# Patient Record
Sex: FEMALE | Race: WHITE | NOT HISPANIC OR LATINO | Employment: OTHER | URBAN - METROPOLITAN AREA
[De-identification: names, ages, dates, MRNs, and addresses within clinical notes are randomized per-mention and may not be internally consistent; named-entity substitution may affect disease eponyms.]

---

## 2017-04-05 ENCOUNTER — OFFICE VISIT (OUTPATIENT)
Dept: MEDICAL GROUP | Facility: PHYSICIAN GROUP | Age: 82
End: 2017-04-05
Payer: MEDICARE

## 2017-04-05 VITALS
TEMPERATURE: 97.7 F | HEART RATE: 71 BPM | HEIGHT: 61 IN | OXYGEN SATURATION: 94 % | SYSTOLIC BLOOD PRESSURE: 130 MMHG | WEIGHT: 118 LBS | BODY MASS INDEX: 22.28 KG/M2 | RESPIRATION RATE: 16 BRPM | DIASTOLIC BLOOD PRESSURE: 50 MMHG

## 2017-04-05 DIAGNOSIS — S72.001D CLOSED RIGHT HIP FRACTURE, WITH ROUTINE HEALING, SUBSEQUENT ENCOUNTER: ICD-10-CM

## 2017-04-05 DIAGNOSIS — I10 ESSENTIAL HYPERTENSION: ICD-10-CM

## 2017-04-05 DIAGNOSIS — F51.01 PRIMARY INSOMNIA: ICD-10-CM

## 2017-04-05 DIAGNOSIS — J44.9 CHRONIC OBSTRUCTIVE PULMONARY DISEASE, UNSPECIFIED COPD TYPE (HCC): ICD-10-CM

## 2017-04-05 PROCEDURE — G8432 DEP SCR NOT DOC, RNG: HCPCS | Performed by: FAMILY MEDICINE

## 2017-04-05 PROCEDURE — 99214 OFFICE O/P EST MOD 30 MIN: CPT | Performed by: FAMILY MEDICINE

## 2017-04-05 PROCEDURE — 1036F TOBACCO NON-USER: CPT | Performed by: FAMILY MEDICINE

## 2017-04-05 PROCEDURE — 0518F FALL PLAN OF CARE DOCD: CPT | Mod: 8P | Performed by: FAMILY MEDICINE

## 2017-04-05 PROCEDURE — G8420 CALC BMI NORM PARAMETERS: HCPCS | Performed by: FAMILY MEDICINE

## 2017-04-05 PROCEDURE — 3288F FALL RISK ASSESSMENT DOCD: CPT | Performed by: FAMILY MEDICINE

## 2017-04-05 PROCEDURE — G8598 ASA/ANTIPLAT THER USED: HCPCS | Performed by: FAMILY MEDICINE

## 2017-04-05 PROCEDURE — 1100F PTFALLS ASSESS-DOCD GE2>/YR: CPT | Performed by: FAMILY MEDICINE

## 2017-04-05 PROCEDURE — 4040F PNEUMOC VAC/ADMIN/RCVD: CPT | Mod: 8P | Performed by: FAMILY MEDICINE

## 2017-04-05 RX ORDER — LIDOCAINE 50 MG/G
OINTMENT TOPICAL PRN
COMMUNITY

## 2017-04-05 RX ORDER — ALPRAZOLAM 0.5 MG/1
0.5 TABLET ORAL NIGHTLY PRN
Qty: 45 TAB | Refills: 0 | Status: SHIPPED | OUTPATIENT
Start: 2017-04-05 | End: 2017-05-13 | Stop reason: SDUPTHER

## 2017-04-05 RX ORDER — DOXYCYCLINE HYCLATE 100 MG
100 TABLET ORAL 2 TIMES DAILY
COMMUNITY
End: 2018-08-08

## 2017-04-05 RX ORDER — ALPRAZOLAM 0.5 MG/1
0.5 TABLET ORAL NIGHTLY PRN
Qty: 45 TAB | Refills: 0 | Status: CANCELLED | OUTPATIENT
Start: 2017-04-05

## 2017-04-05 ASSESSMENT — ENCOUNTER SYMPTOMS
DIZZINESS: 0
EYES NEGATIVE: 1
NECK PAIN: 0
CHILLS: 0
GASTROINTESTINAL NEGATIVE: 1
RESPIRATORY NEGATIVE: 1
CONSTIPATION: 0
HEMOPTYSIS: 0
PSYCHIATRIC NEGATIVE: 1
MYALGIAS: 0
COUGH: 0
CARDIOVASCULAR NEGATIVE: 1
FEVER: 0
HEADACHES: 0
PALPITATIONS: 0
NEUROLOGICAL NEGATIVE: 1
CONSTITUTIONAL NEGATIVE: 1

## 2017-04-05 NOTE — PROGRESS NOTES
Subjective:      Pat Sevilla is a 84 y.o. female who presents with Hospital Follow-up and Medication Refill            HPI Comments: 1. Closed right hip fracture, with routine healing, subsequent encounter  Had right orif after fall was in rehab and now back at home and ambulating with no walker and doing well, one more pt visit and then will be d/c and has ortho appt today    2. Chronic obstructive pulmonary disease, unspecified COPD type (CMS-[facility])  Patient has a diagnosis of copd and is using their medications and trying to avoid triggers such as colds/smoke/allergens.controlled    Stable on o2    3. Essential hypertension  Currently treated for HTN, taking meds with no CP or sob, monitors bp at home periodically. controlled        Past Medical History:    Cerebrovascular disease                                       COPD (chronic obstructive pulmonary disease) (*               HTN (hypertension)                                            Syncope                                                       Hyperlipidemia                                                Anemia                                          9/15/2014       Comment:normocytic    CAD (coronary artery disease)                   July 2008       Comment:s/p Stent of Left LCX x2 with minivision bare                metal stent, Proximal and mid LCX  Past Surgical History:    CARDIAC CATH, LEFT HEART                                       Smoking Status: Former Smoker                   Packs/Day: 0.50  Years: 60         Types: Cigarettes      Quit date: 02/20/2013    Smokeless Status: Never Used                        Alcohol Use: No              Review of patient's family history indicates:    Heart Disease                  Mother                    Heart Disease                  Father                    Heart Disease                  Brother                     Current outpatient prescriptions: •  doxycycline (VIBRAMYCIN) 100 MG Tab, Take 100 mg by  mouth 2 times a day., Disp: , Rfl: •  lidocaine (XYLOCAINE) 5 % Ointment, Apply  to affected area(s) as needed., Disp: , Rfl: •  alprazolam (XANAX) 0.5 MG Tab, Take 1 Tab by mouth at bedtime as needed for Sleep (pt takes 1/2 in morning and 1 at night)., Disp: 45 Tab, Rfl: 0•  simvastatin (ZOCOR) 20 MG Tab, Take 1 Tab by mouth every evening., Disp: 90 Tab, Rfl: 3•  nitroglycerin (NITROSTAT) 0.4 MG SL Tab, Place 1 Tab under tongue as needed for Chest Pain. Not to exceed 3 tablets in 15 minutes, Disp: 25 Tab, Rfl: 1•  aspirin EC (ECOTRIN) 81 MG TBEC, Take 81 mg by mouth every day. Indications: Heart Attack, Disp: , Rfl: •  Fluticasone-Salmeterol (ADVAIR DISKUS INH), Inhale  by mouth. As directed , Disp: , Rfl: •  albuterol (VENTOLIN OR PROVENTIL) 108 (90 BASE) MCG/ACT AERS, Inhale 2 Puffs by mouth every 6 hours as needed. ud , Disp: , Rfl: •  tiotropium (SPIRIVA HANDIHALER) 18 MCG CAPS, Inhale 18 mcg by mouth every day., Disp: , Rfl: •  lisinopril (PRINIVIL) 20 MG Tab, TAKE ONE TABLET BY MOUTH DAILY, Disp: 90 Tab, Rfl: 1•  erythromycin (ERYTHROCIN STEARATE) 250 MG tablet, Take 250 mg by mouth 3 times a day., Disp: , Rfl:     Assessment/plan: diagnoses listed as above in problem list. Patient will continue with current medications/diet/lifestyle modifications    Patient will continue with current medication regimen, advised on taking meds every day, f/u in 3 mo.            Review of Systems   Constitutional: Negative.  Negative for fever and chills.        Past Medical History:    Cerebrovascular disease                                       COPD (chronic obstructive pulmonary disease) (*               HTN (hypertension)                                            Syncope                                                       Hyperlipidemia                                                Anemia                                          9/15/2014       Comment:normocytic    CAD (coronary artery disease)                    "July 2008       Comment:s/p Stent of Left LCX x2 with minivision bare                metal stent, Proximal and mid LCX  Past Surgical History:    CARDIAC CATH, LEFT HEART                                       Smoking Status: Former Smoker                   Packs/Day: 0.50  Years: 60        Types: Cigarettes      Quit date: 02/20/2013    Smokeless Status: Never Used                        Alcohol Use: No              Review of patient's family history indicates:    Heart Disease                  Mother                    Heart Disease                  Father                    Heart Disease                  Brother                    HENT: Negative.    Eyes: Negative.    Respiratory: Negative.  Negative for cough and hemoptysis.    Cardiovascular: Negative.  Negative for chest pain and palpitations.   Gastrointestinal: Negative.  Negative for constipation.   Genitourinary: Negative.  Negative for dysuria and urgency.   Musculoskeletal: Positive for joint pain. Negative for myalgias and neck pain.   Skin: Negative.  Negative for rash.   Neurological: Negative.  Negative for dizziness and headaches.   Endo/Heme/Allergies: Negative.    Psychiatric/Behavioral: Negative.  Negative for suicidal ideas.          Objective:     /50 mmHg  Pulse 71  Temp(Src) 36.5 °C (97.7 °F)  Resp 16  Ht 1.562 m (5' 1.5\")  Wt 53.524 kg (118 lb)  BMI 21.94 kg/m2  SpO2 94%     Physical Exam   Constitutional: She is oriented to person, place, and time. No distress.   HENT:   Head: Normocephalic and atraumatic.   Right Ear: External ear normal.   Left Ear: External ear normal.   Nose: Nose normal.   Mouth/Throat: Oropharynx is clear and moist. No oropharyngeal exudate.   Eyes: Pupils are equal, round, and reactive to light. Right eye exhibits no discharge. Left eye exhibits no discharge. No scleral icterus.   Neck: Normal range of motion. Neck supple. No JVD present. No tracheal deviation present. No thyromegaly present. "   Cardiovascular: Normal rate, regular rhythm, normal heart sounds and intact distal pulses.  Exam reveals no gallop and no friction rub.    No murmur heard.  Pulmonary/Chest: Effort normal and breath sounds normal. No stridor. No respiratory distress. She has no wheezes. She has no rales. She exhibits no tenderness.   Abdominal: Soft. She exhibits no distension. There is no tenderness.   Musculoskeletal:        Right hip: She exhibits decreased strength. She exhibits normal range of motion.   Lymphadenopathy:     She has no cervical adenopathy.   Neurological: She is alert and oriented to person, place, and time.   Skin: Skin is warm and dry. She is not diaphoretic.   Psychiatric: Judgment normal.   Nursing note and vitals reviewed.              Assessment/Plan:     1. Closed right hip fracture, with routine healing, subsequent encounter      2. Chronic obstructive pulmonary disease, unspecified COPD type (CMS-HCC)      3. Essential hypertension

## 2017-04-05 NOTE — MR AVS SNAPSHOT
"Pat Sevilla   2017 11:00 AM   Office Visit   MRN: [id number]    Department:  [facility]    Dept Phone:  [phone]    Description:  Female : 1932   Provider:  Florian Henley M.D.           Reason for Visit     Hospital Follow-up     Medication Refill           Allergies as of 2017     Allergen Noted Reactions    Codeine 2011         You were diagnosed with     Closed right hip fracture, with routine healing, subsequent encounter   [268753]       Chronic obstructive pulmonary disease, unspecified COPD type (CMS-HCC)   [5245795]       Essential hypertension   [7579113]       Primary insomnia   [585932]         Vital Signs     Blood Pressure Pulse Temperature Respirations Height Weight    130/50 mmHg 71 36.5 °C (97.7 °F) 16 1.562 m (5' 1.5\") 53.524 kg (118 lb)    Body Mass Index Oxygen Saturation Smoking Status             21.94 kg/m2 94% Former Smoker         Basic Information     Date Of Birth Sex Race Ethnicity Preferred Language    1932 Female White Non- English      Problem List              ICD-10-CM Priority Class Noted - Resolved    CAD (coronary artery disease) I25.10 High  Unknown - Present    Cerebrovascular disease I67.9 High  Unknown - Present    COPD (chronic obstructive pulmonary disease) (CMS-HCC) J44.9 High  Unknown - Present    Hyperlipemia E78.5 High  Unknown - Present    HTN (hypertension) I10 High  Unknown - Present    Syncope R55 High  Unknown - Present    Hyperlipidemia E78.5 High  Unknown - Present    Epistaxis R04.0   2012 - Present    Anemia (Chronic) D64.9   9/15/2014 - Present      Health Maintenance        Date Due Completion Dates    IMM DTaP/Tdap/Td Vaccine (1 - Tdap) 1951 ---    IMM ZOSTER VACCINE 1992 ---    BONE DENSITY 1997 ---    IMM PNEUMOCOCCAL 65+ (ADULT) LOW/MEDIUM RISK SERIES (1 of 2 - PCV13) 1997 ---    MAMMOGRAM 10/11/2017 10/11/2016 (Declined)    Override on 10/11/2016: Patient Declined    PAP " SMEAR 10/11/2019 10/11/2016 (Declined)    Override on 10/11/2016: Patient Declined    COLONOSCOPY 10/11/2026 10/11/2016 (Declined)    Override on 10/11/2016: Patient Declined            Current Immunizations     Influenza Vaccine Adult HD 10/11/2016      Below and/or attached are the medications your provider expects you to take. Review all of your home medications and newly ordered medications with your provider and/or pharmacist. Follow medication instructions as directed by your provider and/or pharmacist. Please keep your medication list with you and share with your provider. Update the information when medications are discontinued, doses are changed, or new medications (including over-the-counter products) are added; and carry medication information at all times in the event of emergency situations     Allergies:  CODEINE - (reactions not documented)               Medications  Valid as of: April 05, 2017 -  4:49 PM    Generic Name Brand Name Tablet Size Instructions for use    Albuterol Sulfate (Aero Soln) albuterol 108 (90 BASE) MCG/ACT Inhale 2 Puffs by mouth every 6 hours as needed. ud         ALPRAZolam (Tab) XANAX 0.5 MG Take 1 Tab by mouth at bedtime as needed for Sleep (pt takes 1/2 in morning and 1 at night).        Aspirin (Tablet Delayed Response) ECOTRIN 81 MG Take 81 mg by mouth every day. Indications: Heart Attack        Doxycycline Hyclate (Tab) VIBRAMYCIN 100 MG Take 100 mg by mouth 2 times a day.        Erythromycin Stearate (Tab) ERYTHROCIN 250 MG Take 250 mg by mouth 3 times a day.        Fluticasone-Salmeterol   Inhale  by mouth. As directed         Lidocaine (Ointment) XYLOCAINE 5 % Apply  to affected area(s) as needed.        Lisinopril (Tab) PRINIVIL 20 MG TAKE ONE TABLET BY MOUTH DAILY        Nitroglycerin (SL Tab) NITROSTAT 0.4 MG Place 1 Tab under tongue as needed for Chest Pain. Not to exceed 3 tablets in 15 minutes        Simvastatin (Tab) ZOCOR 20 MG Take 1 Tab by mouth every  evening.        Tiotropium Bromide Monohydrate (Cap) SPIRIVA 18 MCG Inhale 18 mcg by mouth every day.        .                 Medicines prescribed today were sent to:     [facility] #[id number] - [place], [place] - [address]    [address] [place] [address]    Phone: [phone] Fax: [phone]    Open 24 Hours?: No      Medication refill instructions:       If your prescription bottle indicates you have medication refills left, it is not necessary to call your provider’s office. Please contact your pharmacy and they will refill your medication.    If your prescription bottle indicates you do not have any refills left, you may request refills at any time through one of the following ways: The online [employer] system (except Urgent Care), by calling your provider’s office, or by asking your pharmacy to contact your provider’s office with a refill request. Medication refills are processed only during regular business hours and may not be available until the next business day. Your provider may request additional information or to have a follow-up visit with you prior to refilling your medication.   *Please Note: Medication refills are assigned a new Rx number when refilled electronically. Your pharmacy may indicate that no refills were authorized even though a new prescription for the same medication is available at the pharmacy. Please request the medicine by name with the pharmacy before contacting your provider for a refill.           [employer] Status: Patient Declined

## 2017-05-16 RX ORDER — ALPRAZOLAM 0.5 MG/1
TABLET ORAL
Qty: 45 TAB | Refills: 0 | Status: SHIPPED | OUTPATIENT
Start: 2017-05-16 | End: 2017-05-17 | Stop reason: SDUPTHER

## 2017-05-16 NOTE — TELEPHONE ENCOUNTER
Was the patient seen in the last year in this department? Yes     Does patient have an active prescription for medications requested? No     Received Request Via: Pharmacy     Last office visit 04/05/17    Last labs 12/28/16      NO Drug Screen on file.

## 2017-05-17 NOTE — TELEPHONE ENCOUNTER
Was the patient seen in the last year in this department? Yes     Does patient have an active prescription for medications requested? Yes     Received Request Via: Pharmacy     Last Visit: 4/5/17  Last Labs: 12/29/16

## 2017-05-18 RX ORDER — ALPRAZOLAM 0.5 MG/1
TABLET ORAL
Qty: 45 TAB | Refills: 0 | Status: SHIPPED | OUTPATIENT
Start: 2017-05-18 | End: 2017-08-09 | Stop reason: SDUPTHER

## 2017-08-02 RX ORDER — ALPRAZOLAM 0.5 MG/1
TABLET ORAL
Qty: 45 TAB | Refills: 0 | OUTPATIENT
Start: 2017-08-02

## 2017-08-09 ENCOUNTER — OFFICE VISIT (OUTPATIENT)
Dept: MEDICAL GROUP | Facility: PHYSICIAN GROUP | Age: 82
End: 2017-08-09
Payer: MEDICARE

## 2017-08-09 VITALS
HEART RATE: 81 BPM | RESPIRATION RATE: 16 BRPM | SYSTOLIC BLOOD PRESSURE: 140 MMHG | WEIGHT: 118 LBS | TEMPERATURE: 97.8 F | DIASTOLIC BLOOD PRESSURE: 52 MMHG | HEIGHT: 61 IN | BODY MASS INDEX: 22.28 KG/M2 | OXYGEN SATURATION: 88 %

## 2017-08-09 DIAGNOSIS — J44.9 CHRONIC OBSTRUCTIVE PULMONARY DISEASE, UNSPECIFIED COPD TYPE (HCC): ICD-10-CM

## 2017-08-09 DIAGNOSIS — R07.89 OTHER CHEST PAIN: ICD-10-CM

## 2017-08-09 DIAGNOSIS — J96.11 CHRONIC RESPIRATORY FAILURE WITH HYPOXIA (HCC): ICD-10-CM

## 2017-08-09 DIAGNOSIS — F41.9 ANXIETY: ICD-10-CM

## 2017-08-09 DIAGNOSIS — Z78.0 POST-MENOPAUSAL: ICD-10-CM

## 2017-08-09 DIAGNOSIS — I10 ESSENTIAL HYPERTENSION: ICD-10-CM

## 2017-08-09 PROCEDURE — 99214 OFFICE O/P EST MOD 30 MIN: CPT | Performed by: FAMILY MEDICINE

## 2017-08-09 RX ORDER — ALPRAZOLAM 0.5 MG/1
TABLET ORAL
Qty: 45 TAB | Refills: 0 | Status: SHIPPED | OUTPATIENT
Start: 2017-08-09 | End: 2017-08-21 | Stop reason: SDUPTHER

## 2017-08-09 RX ORDER — NITROGLYCERIN 0.4 MG/1
0.4 TABLET SUBLINGUAL PRN
Qty: 25 TAB | Refills: 1 | Status: CANCELLED | OUTPATIENT
Start: 2017-08-09

## 2017-08-09 RX ORDER — NITROGLYCERIN 0.4 MG/1
0.4 TABLET SUBLINGUAL PRN
Qty: 25 TAB | Refills: 1 | Status: SHIPPED | OUTPATIENT
Start: 2017-08-09 | End: 2018-08-08 | Stop reason: SDUPTHER

## 2017-08-09 RX ORDER — ALPRAZOLAM 0.5 MG/1
TABLET ORAL
Qty: 45 TAB | Refills: 0 | Status: CANCELLED | OUTPATIENT
Start: 2017-08-09

## 2017-08-09 ASSESSMENT — ENCOUNTER SYMPTOMS
CONSTIPATION: 0
MYALGIAS: 0
NECK PAIN: 0
HEADACHES: 0
RESPIRATORY NEGATIVE: 1
NEUROLOGICAL NEGATIVE: 1
PALPITATIONS: 0
FEVER: 0
DIZZINESS: 0
EYES NEGATIVE: 1
CHILLS: 0
GASTROINTESTINAL NEGATIVE: 1
CARDIOVASCULAR NEGATIVE: 1
NERVOUS/ANXIOUS: 1
HEMOPTYSIS: 0
COUGH: 0
CONSTITUTIONAL NEGATIVE: 1

## 2017-08-09 NOTE — MR AVS SNAPSHOT
"        Pat Sevilla   2017 10:15 AM   Office Visit   MRN: [id number]    Department:  [facility]    Dept Phone:  [phone]    Description:  Female : 1932   Provider:  Florian Henley M.D.           Reason for Visit     Follow-Up     Medication Refill           Allergies as of 2017     Allergen Noted Reactions    Codeine 2011         You were diagnosed with     Other chest pain   [786.59.ICD-9-CM]       Anxiety   [849711]       Essential hypertension   [1642143]       Chronic obstructive pulmonary disease, unspecified COPD type (CMS-HCC)   [9483407]       Chronic respiratory failure with hypoxia (CMS-HCC)   [678142]       Post-menopausal   [356196]         Vital Signs     Blood Pressure Pulse Temperature Respirations Height Weight    140/52 mmHg 81 36.6 °C (97.8 °F) 16 1.549 m (5' 0.98\") 53.524 kg (118 lb)    Body Mass Index Oxygen Saturation Smoking Status             22.31 kg/m2 88% Former Smoker         Basic Information     Date Of Birth Sex Race Ethnicity Preferred Language    1932 Female White Non- English      Problem List              ICD-10-CM Priority Class Noted - Resolved    CAD (coronary artery disease) I25.10 High  Unknown - Present    COPD (chronic obstructive pulmonary disease) (CMS-[facility]) J44.9 High  Unknown - Present    Hyperlipemia E78.5 High  Unknown - Present    HTN (hypertension) I10 High  Unknown - Present    Hyperlipidemia E78.5 High  Unknown - Present    Chronic respiratory failure with hypoxia (CMS-HCC) J96.11   2017 - Present      Health Maintenance        Date Due Completion Dates    IMM DTaP/Tdap/Td Vaccine (1 - Tdap) 1951 ---    IMM ZOSTER VACCINE 1992 ---    BONE DENSITY 1997 ---    IMM PNEUMOCOCCAL 65+ (ADULT) LOW/MEDIUM RISK SERIES (1 of 2 - PCV13) 1997 ---    IMM INFLUENZA (1) 2017 10/11/2016    MAMMOGRAM 10/11/2017 10/11/2016 (Declined)    Override on 10/11/2016: Patient Declined    PAP SMEAR 10/11/2019 " 10/11/2016 (Declined)    Override on 10/11/2016: Patient Declined    COLONOSCOPY 10/11/2026 10/11/2016 (Declined)    Override on 10/11/2016: Patient Declined            Current Immunizations     Influenza Vaccine Adult HD 10/11/2016      Below and/or attached are the medications your provider expects you to take. Review all of your home medications and newly ordered medications with your provider and/or pharmacist. Follow medication instructions as directed by your provider and/or pharmacist. Please keep your medication list with you and share with your provider. Update the information when medications are discontinued, doses are changed, or new medications (including over-the-counter products) are added; and carry medication information at all times in the event of emergency situations     Allergies:  CODEINE - (reactions not documented)               Medications  Valid as of: August 09, 2017 - 10:47 AM    Generic Name Brand Name Tablet Size Instructions for use    Albuterol Sulfate (Aero Soln) albuterol 108 (90 BASE) MCG/ACT Inhale 2 Puffs by mouth every 6 hours as needed. ud         ALPRAZolam (Tab) XANAX 0.5 MG TAKE ONE-HALF TABLET BY MOUTH EVERY MORNING AND ONE TABLET IN THE EVENING        Aspirin (Tablet Delayed Response) ECOTRIN 81 MG Take 81 mg by mouth every day. Indications: Heart Attack        Doxycycline Hyclate (Tab) VIBRAMYCIN 100 MG Take 100 mg by mouth 2 times a day.        Erythromycin Stearate (Tab) ERYTHROCIN 250 MG Take 250 mg by mouth 3 times a day.        Fluticasone-Salmeterol   Inhale  by mouth. As directed         Lidocaine (Ointment) XYLOCAINE 5 % Apply  to affected area(s) as needed.        Lisinopril (Tab) PRINIVIL 20 MG TAKE ONE TABLET BY MOUTH DAILY        Nitroglycerin (SL Tab) NITROSTAT 0.4 MG Place 1 Tab under tongue as needed for Chest Pain. Not to exceed 3 tablets in 15 minutes        Simvastatin (Tab) ZOCOR 20 MG Take 1 Tab by mouth every evening.        Tiotropium Bromide  Monohydrate (Cap) SPIRIVA 18 MCG Inhale 18 mcg by mouth every day.        .                 Medicines prescribed today were sent to:     [facility] #[id number] - [place], [place] - [address]    [address] [place] [address]    Phone: [phone] Fax: [phone]    Open 24 Hours?: No      Medication refill instructions:       If your prescription bottle indicates you have medication refills left, it is not necessary to call your provider’s office. Please contact your pharmacy and they will refill your medication.    If your prescription bottle indicates you do not have any refills left, you may request refills at any time through one of the following ways: The online [employer] system (except Urgent Care), by calling your provider’s office, or by asking your pharmacy to contact your provider’s office with a refill request. Medication refills are processed only during regular business hours and may not be available until the next business day. Your provider may request additional information or to have a follow-up visit with you prior to refilling your medication.   *Please Note: Medication refills are assigned a new Rx number when refilled electronically. Your pharmacy may indicate that no refills were authorized even though a new prescription for the same medication is available at the pharmacy. Please request the medicine by name with the pharmacy before contacting your provider for a refill.        Your To Do List     Future Labs/Procedures Complete By Expires    CBC WITHOUT DIFFERENTIAL  As directed 2/9/2018    COMP METABOLIC PANEL  As directed 8/9/2018    FREE THYROXINE  As directed 8/9/2018    LIPID PROFILE  As directed 8/9/2018    OCCULT BLOOD FECES IMMUNOASSAY  As directed 8/9/2018    TRIIDOTHYRONINE  As directed 8/9/2018    TSH  As directed 8/9/2018    VITAMIN D,25 HYDROXY  As directed 8/9/2018         [employer] Status: Patient Declined

## 2017-08-09 NOTE — PROGRESS NOTES
Subjective:      Pat Sevilla is a 84 y.o. female who presents with Follow-Up and Medication Refill            HPI Comments: 1. Other chest pain  controlled    - nitroglycerin (NITROSTAT) 0.4 MG SL Tab; Place 1 Tab under tongue as needed for Chest Pain. Not to exceed 3 tablets in 15 minutes  Dispense: 25 Tab; Refill: 1    2. Anxiety  controlled    - alprazolam (XANAX) 0.5 MG Tab; TAKE ONE-HALF TABLET BY MOUTH EVERY MORNING AND ONE TABLET IN THE EVENING  Dispense: 45 Tab; Refill: 0    3. Essential hypertension  Currently treated for HTN, taking meds with no CP or sob, monitors bp at home periodically. controlled        4. Chronic obstructive pulmonary disease, unspecified COPD type (CMS-HCC)  Patient has a diagnosis of copd and is using their medications and trying to avoid triggers such as colds/smoke/allergens.controlled    Stable on o2    Past Medical History:    Cerebrovascular disease                                       COPD (chronic obstructive pulmonary disease) (*               HTN (hypertension)                                            Syncope                                                       Hyperlipidemia                                                Anemia                                          9/15/2014       Comment:normocytic    CAD (coronary artery disease)                   July 2008       Comment:s/p Stent of Left LCX x2 with minivision bare                metal stent, Proximal and mid LCX  Past Surgical History:    CARDIAC CATH, LEFT HEART                                       Smoking Status: Former Smoker                   Packs/Day: 0.50  Years: 60         Types: Cigarettes      Quit date: 02/20/2013    Smokeless Status: Never Used                        Alcohol Use: No              Review of patient's family history indicates:    Heart Disease                  Mother                    Heart Disease                  Father                    Heart Disease                  Brother                      Current outpatient prescriptions: •  nitroglycerin (NITROSTAT) 0.4 MG SL Tab, Place 1 Tab under tongue as needed for Chest Pain. Not to exceed 3 tablets in 15 minutes, Disp: 25 Tab, Rfl: 1•  alprazolam (XANAX) 0.5 MG Tab, TAKE ONE-HALF TABLET BY MOUTH EVERY MORNING AND ONE TABLET IN THE EVENING, Disp: 45 Tab, Rfl: 0•  doxycycline (VIBRAMYCIN) 100 MG Tab, Take 100 mg by mouth 2 times a day., Disp: , Rfl: •  lidocaine (XYLOCAINE) 5 % Ointment, Apply  to affected area(s) as needed., Disp: , Rfl: •  lisinopril (PRINIVIL) 20 MG Tab, TAKE ONE TABLET BY MOUTH DAILY, Disp: 90 Tab, Rfl: 1•  erythromycin (ERYTHROCIN STEARATE) 250 MG tablet, Take 250 mg by mouth 3 times a day., Disp: , Rfl: •  simvastatin (ZOCOR) 20 MG Tab, Take 1 Tab by mouth every evening., Disp: 90 Tab, Rfl: 3•  aspirin EC (ECOTRIN) 81 MG TBEC, Take 81 mg by mouth every day. Indications: Heart Attack, Disp: , Rfl: •  Fluticasone-Salmeterol (ADVAIR DISKUS INH), Inhale  by mouth. As directed , Disp: , Rfl: •  albuterol (VENTOLIN OR PROVENTIL) 108 (90 BASE) MCG/ACT AERS, Inhale 2 Puffs by mouth every 6 hours as needed. ud , Disp: , Rfl: •  tiotropium (SPIRIVA HANDIHALER) 18 MCG CAPS, Inhale 18 mcg by mouth every day., Disp: , Rfl:           Review of Systems   Constitutional: Negative.  Negative for fever and chills.        Past Medical History:    Cerebrovascular disease                                       COPD (chronic obstructive pulmonary disease) (*               HTN (hypertension)                                            Syncope                                                       Hyperlipidemia                                                Anemia                                          9/15/2014       Comment:normocytic    CAD (coronary artery disease)                   July 2008       Comment:s/p Stent of Left LCX x2 with minivision bare                metal stent, Proximal and mid LCX  Past Surgical History:    CARDIAC CATH,  "LEFT HEART                                       Smoking Status: Former Smoker                   Packs/Day: 0.50  Years: 60        Types: Cigarettes      Quit date: 02/20/2013    Smokeless Status: Never Used                        Alcohol Use: No              Review of patient's family history indicates:    Heart Disease                  Mother                    Heart Disease                  Father                    Heart Disease                  Brother                    HENT: Negative.    Eyes: Negative.    Respiratory: Negative.  Negative for cough and hemoptysis.    Cardiovascular: Negative.  Negative for chest pain and palpitations.   Gastrointestinal: Negative.  Negative for constipation.   Genitourinary: Negative.  Negative for dysuria and urgency.   Musculoskeletal: Positive for joint pain. Negative for myalgias and neck pain.   Skin: Negative.  Negative for rash.   Neurological: Negative.  Negative for dizziness and headaches.   Endo/Heme/Allergies: Negative.    Psychiatric/Behavioral: Negative for suicidal ideas. The patient is nervous/anxious.           Objective:     /52 mmHg  Pulse 81  Temp(Src) 36.6 °C (97.8 °F)  Resp 16  Ht 1.549 m (5' 0.98\")  Wt 53.524 kg (118 lb)  BMI 22.31 kg/m2  SpO2 88%     Physical Exam   Constitutional: She is oriented to person, place, and time. No distress.   HENT:   Head: Normocephalic and atraumatic.   Right Ear: External ear normal.   Left Ear: External ear normal.   Nose: Nose normal.   Mouth/Throat: Oropharynx is clear and moist. No oropharyngeal exudate.   Eyes: Pupils are equal, round, and reactive to light. Right eye exhibits no discharge. Left eye exhibits no discharge. No scleral icterus.   Neck: Normal range of motion. Neck supple. No JVD present. No tracheal deviation present. No thyromegaly present.   Cardiovascular: Normal rate, regular rhythm, normal heart sounds and intact distal pulses.  Exam reveals no gallop and no friction rub.    No " murmur heard.  Pulmonary/Chest: Effort normal and breath sounds normal. No stridor. No respiratory distress. She has no wheezes. She has no rales. She exhibits no tenderness.   Stable on O2   Abdominal: Soft. She exhibits no distension. There is no tenderness.   Lymphadenopathy:     She has no cervical adenopathy.   Neurological: She is alert and oriented to person, place, and time.   Skin: Skin is warm and dry. She is not diaphoretic.   Psychiatric: Judgment normal.   Nursing note and vitals reviewed.              Assessment/Plan:     1. Other chest pain    - nitroglycerin (NITROSTAT) 0.4 MG SL Tab; Place 1 Tab under tongue as needed for Chest Pain. Not to exceed 3 tablets in 15 minutes  Dispense: 25 Tab; Refill: 1    2. Anxiety    - alprazolam (XANAX) 0.5 MG Tab; TAKE ONE-HALF TABLET BY MOUTH EVERY MORNING AND ONE TABLET IN THE EVENING  Dispense: 45 Tab; Refill: 0    3. Essential hypertension      4. Chronic obstructive pulmonary disease, unspecified COPD type (CMS-HCC)

## 2017-08-17 RX ORDER — LISINOPRIL 20 MG/1
20 TABLET ORAL DAILY
Qty: 90 TAB | Refills: 1 | Status: SHIPPED | OUTPATIENT
Start: 2017-08-17 | End: 2018-02-13 | Stop reason: SDUPTHER

## 2017-08-17 NOTE — TELEPHONE ENCOUNTER
Was the patient seen in the last year in this department? Yes     Does patient have an active prescription for medications requested? No     Received Request Via: Patient   Last seen 8/9/17  Last labs  12/29/16

## 2017-08-21 DIAGNOSIS — R07.89 OTHER CHEST PAIN: ICD-10-CM

## 2017-08-21 DIAGNOSIS — J96.11 CHRONIC RESPIRATORY FAILURE WITH HYPOXIA (HCC): ICD-10-CM

## 2017-08-21 DIAGNOSIS — Z78.0 POST-MENOPAUSAL: ICD-10-CM

## 2017-08-21 DIAGNOSIS — I10 ESSENTIAL HYPERTENSION: ICD-10-CM

## 2017-08-21 DIAGNOSIS — F41.9 ANXIETY: ICD-10-CM

## 2017-08-21 DIAGNOSIS — J44.9 CHRONIC OBSTRUCTIVE PULMONARY DISEASE, UNSPECIFIED COPD TYPE (HCC): ICD-10-CM

## 2017-08-21 RX ORDER — ALPRAZOLAM 0.5 MG/1
TABLET ORAL
Qty: 45 TAB | Refills: 0 | Status: SHIPPED | OUTPATIENT
Start: 2017-08-21 | End: 2017-09-12 | Stop reason: SDUPTHER

## 2017-09-08 LAB
25(OH)D3+25(OH)D2 SERPL-MCNC: 11.4 NG/ML (ref 30–100)
ALBUMIN SERPL-MCNC: 4.2 G/DL (ref 3.5–4.7)
ALBUMIN/GLOB SERPL: 1.4 {RATIO} (ref 1.2–2.2)
ALP SERPL-CCNC: 95 IU/L (ref 39–117)
ALT SERPL-CCNC: 8 IU/L (ref 0–32)
AST SERPL-CCNC: 19 IU/L (ref 0–40)
BILIRUB SERPL-MCNC: 1 MG/DL (ref 0–1.2)
BUN SERPL-MCNC: 12 MG/DL (ref 8–27)
BUN/CREAT SERPL: 12 (ref 12–28)
CALCIUM SERPL-MCNC: 9.3 MG/DL (ref 8.7–10.3)
CHLORIDE SERPL-SCNC: 98 MMOL/L (ref 96–106)
CHOLEST SERPL-MCNC: 149 MG/DL (ref 100–199)
CO2 SERPL-SCNC: 23 MMOL/L (ref 18–29)
COMMENT 011824: NORMAL
CREAT SERPL-MCNC: 1.01 MG/DL (ref 0.57–1)
ERYTHROCYTE [DISTWIDTH] IN BLOOD BY AUTOMATED COUNT: 15.1 % (ref 12.3–15.4)
GLOBULIN SER CALC-MCNC: 2.9 G/DL (ref 1.5–4.5)
GLUCOSE SERPL-MCNC: 108 MG/DL (ref 65–99)
HCT VFR BLD AUTO: 33.9 % (ref 34–46.6)
HDLC SERPL-MCNC: 59 MG/DL
HGB BLD-MCNC: 11.2 G/DL (ref 11.1–15.9)
LDLC SERPL CALC-MCNC: 70 MG/DL (ref 0–99)
MCH RBC QN AUTO: 28.4 PG (ref 26.6–33)
MCHC RBC AUTO-ENTMCNC: 33 G/DL (ref 31.5–35.7)
MCV RBC AUTO: 86 FL (ref 79–97)
NRBC BLD AUTO-RTO: ABNORMAL %
PLATELET # BLD AUTO: 328 X10E3/UL (ref 150–379)
POTASSIUM SERPL-SCNC: 4.1 MMOL/L (ref 3.5–5.2)
PROT SERPL-MCNC: 7.1 G/DL (ref 6–8.5)
RBC # BLD AUTO: 3.94 X10E6/UL (ref 3.77–5.28)
SODIUM SERPL-SCNC: 139 MMOL/L (ref 134–144)
T3 SERPL-MCNC: 111 NG/DL (ref 71–180)
T4 FREE SERPL-MCNC: 1.2 NG/DL (ref 0.82–1.77)
TRIGL SERPL-MCNC: 98 MG/DL (ref 0–149)
TSH SERPL DL<=0.005 MIU/L-ACNC: 0.95 UIU/ML (ref 0.45–4.5)
VLDLC SERPL CALC-MCNC: 20 MG/DL (ref 5–40)
WBC # BLD AUTO: 6.6 X10E3/UL (ref 3.4–10.8)

## 2017-09-11 LAB — HEMOCCULT STL QL IA: NEGATIVE

## 2017-09-12 ENCOUNTER — OFFICE VISIT (OUTPATIENT)
Dept: MEDICAL GROUP | Facility: PHYSICIAN GROUP | Age: 82
End: 2017-09-12
Payer: MEDICARE

## 2017-09-12 VITALS
DIASTOLIC BLOOD PRESSURE: 60 MMHG | BODY MASS INDEX: 19.52 KG/M2 | WEIGHT: 99.4 LBS | SYSTOLIC BLOOD PRESSURE: 122 MMHG | HEART RATE: 89 BPM | TEMPERATURE: 97.1 F | OXYGEN SATURATION: 90 % | RESPIRATION RATE: 16 BRPM | HEIGHT: 60 IN

## 2017-09-12 DIAGNOSIS — F34.1 DYSTHYMIA: ICD-10-CM

## 2017-09-12 DIAGNOSIS — R07.89 OTHER CHEST PAIN: ICD-10-CM

## 2017-09-12 DIAGNOSIS — I10 ESSENTIAL HYPERTENSION: ICD-10-CM

## 2017-09-12 DIAGNOSIS — F41.9 ANXIETY: ICD-10-CM

## 2017-09-12 DIAGNOSIS — N18.2 CRI (CHRONIC RENAL INSUFFICIENCY), STAGE 2 (MILD): ICD-10-CM

## 2017-09-12 DIAGNOSIS — J44.9 CHRONIC OBSTRUCTIVE PULMONARY DISEASE, UNSPECIFIED COPD TYPE (HCC): ICD-10-CM

## 2017-09-12 DIAGNOSIS — J96.11 CHRONIC RESPIRATORY FAILURE WITH HYPOXIA (HCC): ICD-10-CM

## 2017-09-12 DIAGNOSIS — E78.2 MIXED HYPERLIPIDEMIA: ICD-10-CM

## 2017-09-12 DIAGNOSIS — Z78.0 POST-MENOPAUSAL: ICD-10-CM

## 2017-09-12 PROBLEM — N18.9 CRI (CHRONIC RENAL INSUFFICIENCY): Status: ACTIVE | Noted: 2017-09-12

## 2017-09-12 PROCEDURE — 99214 OFFICE O/P EST MOD 30 MIN: CPT | Performed by: FAMILY MEDICINE

## 2017-09-12 RX ORDER — ALPRAZOLAM 0.5 MG/1
TABLET ORAL
Qty: 45 TAB | Refills: 0 | Status: SHIPPED | OUTPATIENT
Start: 2017-09-12 | End: 2017-10-26 | Stop reason: SDUPTHER

## 2017-09-12 RX ORDER — FLUOXETINE 10 MG/1
10 CAPSULE ORAL DAILY
Qty: 30 CAP | Refills: 3 | Status: SHIPPED | OUTPATIENT
Start: 2017-09-12 | End: 2018-02-03 | Stop reason: SDUPTHER

## 2017-09-12 ASSESSMENT — ENCOUNTER SYMPTOMS
INSOMNIA: 0
MYALGIAS: 0
RESPIRATORY NEGATIVE: 1
NECK PAIN: 0
COUGH: 0
FEVER: 0
GASTROINTESTINAL NEGATIVE: 1
CONSTIPATION: 0
NERVOUS/ANXIOUS: 1
MUSCULOSKELETAL NEGATIVE: 1
HEMOPTYSIS: 0
CHILLS: 0
CARDIOVASCULAR NEGATIVE: 1
DEPRESSION: 1
HEADACHES: 0
DIZZINESS: 0
NEUROLOGICAL NEGATIVE: 1
CONSTITUTIONAL NEGATIVE: 1
EYES NEGATIVE: 1
PALPITATIONS: 0

## 2017-09-12 ASSESSMENT — PATIENT HEALTH QUESTIONNAIRE - PHQ9: CLINICAL INTERPRETATION OF PHQ2 SCORE: 0

## 2017-09-12 NOTE — PROGRESS NOTES
Subjective:      Pat Sevilla is a 85 y.o. female who presents with Hypertension            1. Chronic respiratory failure with hypoxia (CMS-HCC)  Stable on O2    2. Chronic obstructive pulmonary disease, unspecified COPD type (CMS-HCC)  Patient has a diagnosis of copd and is using their medications and trying to avoid triggers such as colds/smoke/allergens.controlled      3. Mixed hyperlipidemia  Currently treated for HLD, taking meds with no new myalgias or joint pain, watching fats in diet  controlled      4. Dysthymia  Depressed mood since her  left her last year. We will try some prozac to help with mood and increase appetite  - fluoxetine (PROZAC) 10 MG Cap; Take 1 Cap by mouth every day.  Dispense: 30 Cap; Refill: 3    5. CRI (chronic renal insufficiency), stage 2 (mild)  Patient is currently being followed for chronic renal insufficiency. They are avoiding nsaids and maintaining hydration and following renal diet. Taking all appropriate meds. No new change in urine frequency or volume.      Past Medical History:  9/15/2014: Anemia      Comment: normocytic  July 2008: CAD (coronary artery disease)      Comment: s/p Stent of Left LCX x2 with minivision bare                metal stent, Proximal and mid LCX  No date: Cerebrovascular disease  No date: COPD (chronic obstructive pulmonary disease) (*  No date: HTN (hypertension)  No date: Hyperlipidemia  No date: Syncope  Past Surgical History:  No date: CARDIAC CATH, LEFT HEART  Smoking status: Former Smoker                                                              Packs/day: 0.50      Years: 60.00        Types: Cigarettes     Quit date: 2/20/2013  Smokeless tobacco: Never Used                      Alcohol use: No              Review of patient's family history indicates:    Heart Disease                  Mother                    Heart Disease                  Father                    Heart Disease                  Brother                      Current Outpatient Prescriptions: •  fluoxetine (PROZAC) 10 MG Cap, Take 1 Cap by mouth every day., Disp: 30 Cap, Rfl: 3•  alprazolam (XANAX) 0.5 MG Tab, TAKE ONE-HALF TABLET BY MOUTH EVERY MORNING AND ONE TABLET IN THE EVENING, Disp: 45 Tab, Rfl: 0•  lisinopril (PRINIVIL) 20 MG Tab, Take 1 Tab by mouth every day. TAKE ONE TABLET BY MOUTH DAILY, Disp: 90 Tab, Rfl: 1•  nitroglycerin (NITROSTAT) 0.4 MG SL Tab, Place 1 Tab under tongue as needed for Chest Pain. Not to exceed 3 tablets in 15 minutes, Disp: 25 Tab, Rfl: 1•  doxycycline (VIBRAMYCIN) 100 MG Tab, Take 100 mg by mouth 2 times a day., Disp: , Rfl: •  lidocaine (XYLOCAINE) 5 % Ointment, Apply  to affected area(s) as needed., Disp: , Rfl: •  erythromycin (ERYTHROCIN STEARATE) 250 MG tablet, Take 250 mg by mouth 3 times a day., Disp: , Rfl: •  simvastatin (ZOCOR) 20 MG Tab, Take 1 Tab by mouth every evening., Disp: 90 Tab, Rfl: 3•  aspirin EC (ECOTRIN) 81 MG TBEC, Take 81 mg by mouth every day. Indications: Heart Attack, Disp: , Rfl: •  Fluticasone-Salmeterol (ADVAIR DISKUS INH), Inhale  by mouth. As directed , Disp: , Rfl: •  albuterol (VENTOLIN OR PROVENTIL) 108 (90 BASE) MCG/ACT AERS, Inhale 2 Puffs by mouth every 6 hours as needed. ud , Disp: , Rfl: •  tiotropium (SPIRIVA HANDIHALER) 18 MCG CAPS, Inhale 18 mcg by mouth every day., Disp: , Rfl:           Review of Systems   Constitutional: Negative.  Negative for chills and fever.        Past Medical History:  9/15/2014: Anemia      Comment: normocytic  July 2008: CAD (coronary artery disease)      Comment: s/p Stent of Left LCX x2 with minivision bare                metal stent, Proximal and mid LCX  No date: Cerebrovascular disease  No date: COPD (chronic obstructive pulmonary disease) (*  No date: HTN (hypertension)  No date: Hyperlipidemia  No date: Syncope  Past Surgical History:  No date: CARDIAC CATH, LEFT HEART  Smoking status: Former Smoker                                                               Packs/day: 0.50      Years: 60.00        Types: Cigarettes     Quit date: 2/20/2013  Smokeless tobacco: Never Used                      Alcohol use: No              Review of patient's family history indicates:    Heart Disease                  Mother                    Heart Disease                  Father                    Heart Disease                  Brother                    HENT: Negative.    Eyes: Negative.    Respiratory: Negative.  Negative for cough and hemoptysis.    Cardiovascular: Negative.  Negative for chest pain and palpitations.   Gastrointestinal: Negative.  Negative for constipation.   Genitourinary: Negative.  Negative for dysuria and urgency.   Musculoskeletal: Negative.  Negative for myalgias and neck pain.   Skin: Negative.  Negative for rash.   Neurological: Negative.  Negative for dizziness and headaches.   Endo/Heme/Allergies: Negative.    Psychiatric/Behavioral: Positive for depression. Negative for suicidal ideas. The patient is nervous/anxious. The patient does not have insomnia.           Objective:     /60   Pulse 89   Temp 36.2 °C (97.1 °F)   Resp 16   Ht 1.524 m (5')   Wt 45.1 kg (99 lb 6.4 oz)   SpO2 90%   BMI 19.41 kg/m²      Physical Exam   Constitutional: She is oriented to person, place, and time. She appears well-developed and well-nourished. No distress.   HENT:   Head: Normocephalic and atraumatic.   Mouth/Throat: Oropharynx is clear and moist. No oropharyngeal exudate.   Eyes: Pupils are equal, round, and reactive to light.   Cardiovascular: Normal rate, regular rhythm, normal heart sounds and intact distal pulses.  Exam reveals no gallop and no friction rub.    No murmur heard.  Pulmonary/Chest: Effort normal and breath sounds normal. No respiratory distress. She has no wheezes. She has no rales. She exhibits no tenderness.   Neurological: She is alert and oriented to person, place, and time.   Skin: She is not diaphoretic.   Psychiatric: Her behavior  is normal. Judgment and thought content normal. Her affect is not blunt, not labile and not inappropriate. Thought content is not paranoid and not delusional. She exhibits a depressed mood. She expresses no homicidal and no suicidal ideation. She expresses no suicidal plans and no homicidal plans.   Nursing note and vitals reviewed.              Assessment/Plan:     1. Chronic respiratory failure with hypoxia (CMS-[facility])      2. Chronic obstructive pulmonary disease, unspecified COPD type (CMS-[facility])      3. Mixed hyperlipidemia      4. Dysthymia    - fluoxetine (PROZAC) 10 MG Cap; Take 1 Cap by mouth every day.  Dispense: 30 Cap; Refill: 3    5. CRI (chronic renal insufficiency), stage 2 (mild)

## 2017-10-26 DIAGNOSIS — Z78.0 POST-MENOPAUSAL: ICD-10-CM

## 2017-10-26 DIAGNOSIS — I10 ESSENTIAL HYPERTENSION: ICD-10-CM

## 2017-10-26 DIAGNOSIS — J96.11 CHRONIC RESPIRATORY FAILURE WITH HYPOXIA (HCC): ICD-10-CM

## 2017-10-26 DIAGNOSIS — R07.89 OTHER CHEST PAIN: ICD-10-CM

## 2017-10-26 DIAGNOSIS — J44.9 CHRONIC OBSTRUCTIVE PULMONARY DISEASE, UNSPECIFIED COPD TYPE (HCC): ICD-10-CM

## 2017-10-26 DIAGNOSIS — F41.9 ANXIETY: ICD-10-CM

## 2017-10-26 RX ORDER — ALPRAZOLAM 0.5 MG/1
TABLET ORAL
Qty: 45 TAB | Refills: 0 | Status: SHIPPED | OUTPATIENT
Start: 2017-10-26 | End: 2017-10-26 | Stop reason: SDUPTHER

## 2017-10-26 RX ORDER — ALPRAZOLAM 0.5 MG/1
TABLET ORAL
Qty: 45 TAB | Refills: 0 | Status: SHIPPED | OUTPATIENT
Start: 2017-10-26 | End: 2017-12-04 | Stop reason: SDUPTHER

## 2017-10-26 NOTE — TELEPHONE ENCOUNTER
Was the patient seen in the last year in this department? Yes     Does patient have an active prescription for medications requested? No     Received Request Via: Patient   Last seen 9 /12 /17  Last labs  9/11/17    Has current drug screen

## 2017-12-04 DIAGNOSIS — F41.9 ANXIETY: ICD-10-CM

## 2017-12-04 DIAGNOSIS — J96.11 CHRONIC RESPIRATORY FAILURE WITH HYPOXIA (HCC): ICD-10-CM

## 2017-12-04 DIAGNOSIS — Z78.0 POST-MENOPAUSAL: ICD-10-CM

## 2017-12-04 DIAGNOSIS — R07.89 OTHER CHEST PAIN: ICD-10-CM

## 2017-12-04 DIAGNOSIS — J44.9 CHRONIC OBSTRUCTIVE PULMONARY DISEASE, UNSPECIFIED COPD TYPE (HCC): ICD-10-CM

## 2017-12-04 DIAGNOSIS — I10 ESSENTIAL HYPERTENSION: ICD-10-CM

## 2017-12-04 NOTE — TELEPHONE ENCOUNTER
Was the patient seen in the last year in this department? Yes     Does patient have an active prescription for medications requested? Yes     Received Request Via: Patient     Last Visit: 9/12/17  Last Labs: UDS 2017

## 2017-12-05 DIAGNOSIS — E78.5 HYPERLIPIDEMIA, UNSPECIFIED HYPERLIPIDEMIA TYPE: ICD-10-CM

## 2017-12-05 RX ORDER — ALPRAZOLAM 0.5 MG/1
TABLET ORAL
Qty: 45 TAB | Refills: 0 | Status: SHIPPED | OUTPATIENT
Start: 2017-12-05 | End: 2018-01-24 | Stop reason: SDUPTHER

## 2017-12-05 RX ORDER — SIMVASTATIN 20 MG
20 TABLET ORAL EVERY EVENING
Qty: 90 TAB | Refills: 3 | Status: SHIPPED | OUTPATIENT
Start: 2017-12-05 | End: 2018-04-24 | Stop reason: SINTOL

## 2017-12-05 NOTE — TELEPHONE ENCOUNTER
Was the patient seen in the last year in this department? Yes     Does patient have an active prescription for medications requested? No     Received Request Via: Patient   Last seen  9/12/17  Last labs   9/6/17

## 2017-12-06 ENCOUNTER — OFFICE VISIT (OUTPATIENT)
Dept: CARDIOLOGY | Facility: PHYSICIAN GROUP | Age: 82
End: 2017-12-06
Payer: MEDICARE

## 2017-12-06 VITALS
HEART RATE: 81 BPM | OXYGEN SATURATION: 95 % | SYSTOLIC BLOOD PRESSURE: 140 MMHG | BODY MASS INDEX: 19.63 KG/M2 | WEIGHT: 100 LBS | HEIGHT: 60 IN | DIASTOLIC BLOOD PRESSURE: 60 MMHG

## 2017-12-06 DIAGNOSIS — I77.9 BILATERAL CAROTID ARTERY DISEASE (HCC): ICD-10-CM

## 2017-12-06 DIAGNOSIS — I25.10 CORONARY ARTERY DISEASE INVOLVING NATIVE CORONARY ARTERY OF NATIVE HEART WITHOUT ANGINA PECTORIS: ICD-10-CM

## 2017-12-06 DIAGNOSIS — E78.2 MIXED HYPERLIPIDEMIA: ICD-10-CM

## 2017-12-06 DIAGNOSIS — I10 ESSENTIAL HYPERTENSION: ICD-10-CM

## 2017-12-06 PROCEDURE — 99214 OFFICE O/P EST MOD 30 MIN: CPT | Performed by: INTERNAL MEDICINE

## 2017-12-06 ASSESSMENT — ENCOUNTER SYMPTOMS
BLURRED VISION: 0
HEADACHES: 0
DIZZINESS: 0
COUGH: 0
BRUISES/BLEEDS EASILY: 0
PALPITATIONS: 0
LOSS OF CONSCIOUSNESS: 0
PND: 0
NAUSEA: 0
SHORTNESS OF BREATH: 1
FEVER: 0
FOCAL WEAKNESS: 0
ORTHOPNEA: 0
SORE THROAT: 0
CLAUDICATION: 0
FALLS: 0
ABDOMINAL PAIN: 0

## 2017-12-06 NOTE — LETTER
[facility]   [address] [place] [address]  Phone: [phone]  Fax: [phone]              Pat Sevilla  8/31/1932    Encounter Date: 12/6/2017    Addie Parks M.D.          PROGRESS NOTE:  Subjective:   Pat Sevilla is a pleasant 85-year-old female no history of   1. CAD status post PCI BMS 2 proximal and mid circumflex in 2007- Nuclear stress test from 3/16 negative for ischemia  2. Hypertension   3. Dyslipidemia   4. Carotid artery disease  5. COPD on oxygen 24 X 7- 4 L NC.   Presenting today for follow-up evaluation of CAD, hypertension and dyslipidemia.    Patient denied any complaints of exertional chest pain pressure or tightness. Chronic dyspnea on exertion unchanged. She still continues to be on 4 L nasal cannula 24 X 7. No complaints of myalgias while on statins. Denied any complaints of dizziness lightheadedness or LOC. No complaints of lower extremity edema or claudication.        Past Medical History:   asdfasdfads Date   • Anemia 9/15/2014    normocytic   • CAD (coronary artery disease) July 2008    s/p Stent of Left LCX x2 with minivision bare metal stent, Proximal and mid LCX   • Cerebrovascular disease    • COPD (chronic obstructive pulmonary disease) (CMS-HCC)    • HTN (hypertension)    • Hyperlipidemia    • Syncope      Past Surgical History:   Procedure Laterality Date   • CARDIAC CATH, LEFT HEART       Family History   Problem Relation Age of Onset   • Heart Disease Mother    • Heart Disease Father    • Heart Disease Brother      History   Smoking Status   • Former Smoker   • Packs/day: 0.50   • Years: 60.00   • Types: Cigarettes   • Quit date: 2/20/2013   Smokeless Tobacco   • Never Used     Allergies   Allergen Reactions   • Codeine      Outpatient Encounter Prescriptions as of 12/6/2017   Medication Sig Dispense Refill   • Fluticasone Furoate-Vilanterol (BREO ELLIPTA) 200-25 MCG/INH AEROSOL POWDER, BREATH  ACTIVATED Inhale 1 Puff by mouth.     • alprazolam (XANAX) 0.5 MG Tab TAKE ONE-HALF TABLET BY MOUTH EVERY MORNING AND ONE TABLET IN THE EVENING 45 Tab 0   • simvastatin (ZOCOR) 20 MG Tab Take 1 Tab by mouth every evening. 90 Tab 3   • fluoxetine (PROZAC) 10 MG Cap Take 1 Cap by mouth every day. 30 Cap 3   • lisinopril (PRINIVIL) 20 MG Tab Take 1 Tab by mouth every day. TAKE ONE TABLET BY MOUTH DAILY 90 Tab 1   • lidocaine (XYLOCAINE) 5 % Ointment Apply  to affected area(s) as needed.     • erythromycin (ERYTHROCIN STEARATE) 250 MG tablet Take 250 mg by mouth 3 times a day.     • aspirin EC (ECOTRIN) 81 MG TBEC Take 81 mg by mouth every day. Indications: Heart Attack     • albuterol (VENTOLIN OR PROVENTIL) 108 (90 BASE) MCG/ACT AERS Inhale 2 Puffs by mouth every 6 hours as needed. ud      • tiotropium (SPIRIVA HANDIHALER) 18 MCG CAPS Inhale 18 mcg by mouth every day.     • nitroglycerin (NITROSTAT) 0.4 MG SL Tab Place 1 Tab under tongue as needed for Chest Pain. Not to exceed 3 tablets in 15 minutes 25 Tab 1   • doxycycline (VIBRAMYCIN) 100 MG Tab Take 100 mg by mouth 2 times a day.     • Fluticasone-Salmeterol (ADVAIR DISKUS INH) Inhale  by mouth. As directed        No facility-administered encounter medications on file as of 12/6/2017.      Review of Systems   Constitutional: Negative for fever.   HENT: Negative for sore throat.    Eyes: Negative for blurred vision.   Respiratory: Positive for shortness of breath. Negative for cough.    Cardiovascular: Negative for chest pain, palpitations, orthopnea, claudication, leg swelling and PND.   Gastrointestinal: Negative for abdominal pain and nausea.   Musculoskeletal: Negative for falls.   Skin: Negative for rash.   Neurological: Negative for dizziness, focal weakness, loss of consciousness and headaches.   Endo/Heme/Allergies: Does not bruise/bleed easily.        Objective:   /60   Pulse 81   Ht 1.524 m (5')   Wt 45.4 kg (100 lb)   SpO2 95%   BMI 19.53  kg/m²      Physical Exam   Constitutional: No distress.   Using supplemental oxygen       HENT:   Head: Normocephalic and atraumatic.   Mouth/Throat: Oropharynx is clear and moist.   Eyes: Conjunctivae are normal. Pupils are equal, round, and reactive to light. Right eye exhibits no discharge. Left eye exhibits no discharge. No scleral icterus.   Neck: No JVD present. No tracheal deviation present.   Cardiovascular: Normal rate and regular rhythm.  Exam reveals no gallop and no friction rub.    No murmur heard.  Pulmonary/Chest: Effort normal. She has no rales.   Coarse breath sounds bilateral lower lungs   Abdominal: Bowel sounds are normal. There is no tenderness.   Musculoskeletal: She exhibits no edema.   Neurological: She is alert.   Skin: No rash noted. She is not diaphoretic.   Psychiatric: She has a normal mood and affect.   Results for DAT ENRIQUEZ (MRN [id number]) as of 12/6/2017 14:53   11/15/2016  9/6/2017    Sodium 142 139   Potassium 4.9 4.1   Chloride 99 98   Co2 26 23   Glucose 95 108 (H)   Bun 14 12   Creatinine 1.12 (H) 1.01 (H)   GFR If Non  45 (L) 51 (L)   Bun-Creatinine Ratio 13 12   Calcium 9.4 9.3   AST(SGOT) 19 19   ALT(SGPT) 12 8   Alkaline Phosphatase 76 95   Cholesterol,Tot 149 149   Triglycerides 62 98   HDL 70 59   LDL 67 70   VLDL Cholesterol Calc 12 20     Carotid artery Doppler: 12/19/16  Mild bilateral internal carotid artery stenosis (<50%).      EKG: 3/23/16  EKG personally reviewed by me  Sinus rhythm 86 bpm normal axis septal Q waves  ms    Transthoracic echo: 3/14/16  Normal left ventricle systolic function. EF more than 70%. Diastolic function not evaluated due to patient arrhythmia.  Trace mitral regurgitation.  Mild to moderate pulmonary hypertension RVSP 35-40 mmHg.    Nuclear stress test: 3/13/16  Normal nuclear stress test. No evidence of infarction or ischemia. Normal systolic function and EF of 70%. Calculated TID 0.98.    Carotid Doppler:  10/26/15  Mild stenosis of the right internal carotid (< 50%).   Moderate stenosis of the left internal carotid (50-69%).   Compared to the report of the duplex scan 2013, there is no significant change    Transthoracic echo: 10/12/15  No prior study is available for comparison.   Left ventricular ejection fraction is visually estimated to be 65%. Normal regional wall motion. Grade I diastolic dysfunction.  No significant valve disease or flow abnormalities.   Mild tricuspid regurgitation. Right ventricular systolic pressure is estimated to be 35 mmHg.   The aortic root is normal     EK/21/15  Sinus tachycardia 102 normal axis no significant ST-T wave changes.    Labs: 8/22/15  Sodium 136, potassium 4.4, chloride 102, bicarbonate 30, glucose 170, BUN 9, creatinine 1.19 GFR 43    Carotid Doppler: 13  Moderate 50-69% stenosis left internal carotid artery.  Mild right internal carotid artery disease  Normal vertebral flow  Assessment:     1. CAD status post BMS LCx in   2. Hypertension  3. Dyslipidemia  4. Carotid artery disease    Medical Decision Making:  Today's Assessment / Status / Plan:     1.Stable no complaints of angina.  Continue aspirin 81 mg daily..  2. Blood pressure well controlled at the present visit.  Continue lisinopril 20 mg daily.  3. LDL is 70.  Continue Zocor 20 mg qHs.  4. Carotid artery disease.  Carotid artery Doppler in one year prior to next visit.    Follow-up in one year.  Labs carotid artery Doppler prior to next visit.    Thank you for allowing me to participate in taking care of patient.    Addie Parks. MD.      Florian Henley M.D.  [address] [facility] [address]  VIA In Basket

## 2017-12-06 NOTE — PROGRESS NOTES
Subjective:   Pat Sevilla is a pleasant 85-year-old female no history of   1. CAD status post PCI BMS 2 proximal and mid circumflex in 2007- Nuclear stress test from 3/16 negative for ischemia  2. Hypertension   3. Dyslipidemia   4. Carotid artery disease  5. COPD on oxygen 24 X 7- 4 L NC.   Presenting today for follow-up evaluation of CAD, hypertension and dyslipidemia.    Patient denied any complaints of exertional chest pain pressure or tightness. Chronic dyspnea on exertion unchanged. She still continues to be on 4 L nasal cannula 24 X 7. No complaints of myalgias while on statins. Denied any complaints of dizziness lightheadedness or LOC. No complaints of lower extremity edema or claudication.        Past Medical History:   asdfasdfads Date   • Anemia 9/15/2014    normocytic   • CAD (coronary artery disease) July 2008    s/p Stent of Left LCX x2 with minivision bare metal stent, Proximal and mid LCX   • Cerebrovascular disease    • COPD (chronic obstructive pulmonary disease) (CMS-[facility])    • HTN (hypertension)    • Hyperlipidemia    • Syncope      Past Surgical History:   Procedure Laterality Date   • CARDIAC CATH, LEFT HEART       Family History   Problem Relation Age of Onset   • Heart Disease Mother    • Heart Disease Father    • Heart Disease Brother      History   Smoking Status   • Former Smoker   • Packs/day: 0.50   • Years: 60.00   • Types: Cigarettes   • Quit date: 2/20/2013   Smokeless Tobacco   • Never Used     Allergies   Allergen Reactions   • Codeine      Outpatient Encounter Prescriptions as of 12/6/2017   Medication Sig Dispense Refill   • Fluticasone Furoate-Vilanterol (BREO ELLIPTA) 200-25 MCG/INH AEROSOL POWDER, BREATH ACTIVATED Inhale 1 Puff by mouth.     • alprazolam (XANAX) 0.5 MG Tab TAKE ONE-HALF TABLET BY MOUTH EVERY MORNING AND ONE TABLET IN THE EVENING 45 Tab 0   • simvastatin (ZOCOR) 20 MG Tab Take 1 Tab by mouth every evening. 90 Tab 3   • fluoxetine (PROZAC) 10 MG Cap Take 1 Cap  by mouth every day. 30 Cap 3   • lisinopril (PRINIVIL) 20 MG Tab Take 1 Tab by mouth every day. TAKE ONE TABLET BY MOUTH DAILY 90 Tab 1   • lidocaine (XYLOCAINE) 5 % Ointment Apply  to affected area(s) as needed.     • erythromycin (ERYTHROCIN STEARATE) 250 MG tablet Take 250 mg by mouth 3 times a day.     • aspirin EC (ECOTRIN) 81 MG TBEC Take 81 mg by mouth every day. Indications: Heart Attack     • albuterol (VENTOLIN OR PROVENTIL) 108 (90 BASE) MCG/ACT AERS Inhale 2 Puffs by mouth every 6 hours as needed. ud      • tiotropium (SPIRIVA HANDIHALER) 18 MCG CAPS Inhale 18 mcg by mouth every day.     • nitroglycerin (NITROSTAT) 0.4 MG SL Tab Place 1 Tab under tongue as needed for Chest Pain. Not to exceed 3 tablets in 15 minutes 25 Tab 1   • doxycycline (VIBRAMYCIN) 100 MG Tab Take 100 mg by mouth 2 times a day.     • Fluticasone-Salmeterol (ADVAIR DISKUS INH) Inhale  by mouth. As directed        No facility-administered encounter medications on file as of 12/6/2017.      Review of Systems   Constitutional: Negative for fever.   HENT: Negative for sore throat.    Eyes: Negative for blurred vision.   Respiratory: Positive for shortness of breath. Negative for cough.    Cardiovascular: Negative for chest pain, palpitations, orthopnea, claudication, leg swelling and PND.   Gastrointestinal: Negative for abdominal pain and nausea.   Musculoskeletal: Negative for falls.   Skin: Negative for rash.   Neurological: Negative for dizziness, focal weakness, loss of consciousness and headaches.   Endo/Heme/Allergies: Does not bruise/bleed easily.        Objective:   /60   Pulse 81   Ht 1.524 m (5')   Wt 45.4 kg (100 lb)   SpO2 95%   BMI 19.53 kg/m²     Physical Exam   Constitutional: No distress.   Using supplemental oxygen       HENT:   Head: Normocephalic and atraumatic.   Mouth/Throat: Oropharynx is clear and moist.   Eyes: Conjunctivae are normal. Pupils are equal, round, and reactive to light. Right eye exhibits  no discharge. Left eye exhibits no discharge. No scleral icterus.   Neck: No JVD present. No tracheal deviation present.   Cardiovascular: Normal rate and regular rhythm.  Exam reveals no gallop and no friction rub.    No murmur heard.  Pulmonary/Chest: Effort normal. She has no rales.   Coarse breath sounds bilateral lower lungs   Abdominal: Bowel sounds are normal. There is no tenderness.   Musculoskeletal: She exhibits no edema.   Neurological: She is alert.   Skin: No rash noted. She is not diaphoretic.   Psychiatric: She has a normal mood and affect.   Results for DAT ENRIQUEZ (MRN [id number]) as of 12/6/2017 14:53   11/15/2016  9/6/2017    Sodium 142 139   Potassium 4.9 4.1   Chloride 99 98   Co2 26 23   Glucose 95 108 (H)   Bun 14 12   Creatinine 1.12 (H) 1.01 (H)   GFR If Non  45 (L) 51 (L)   Bun-Creatinine Ratio 13 12   Calcium 9.4 9.3   AST(SGOT) 19 19   ALT(SGPT) 12 8   Alkaline Phosphatase 76 95   Cholesterol,Tot 149 149   Triglycerides 62 98   HDL 70 59   LDL 67 70   VLDL Cholesterol Calc 12 20     Carotid artery Doppler: 12/19/16  Mild bilateral internal carotid artery stenosis (<50%).      EKG: 3/23/16  EKG personally reviewed by me  Sinus rhythm 86 bpm normal axis septal Q waves  ms    Transthoracic echo: 3/14/16  Normal left ventricle systolic function. EF more than 70%. Diastolic function not evaluated due to patient arrhythmia.  Trace mitral regurgitation.  Mild to moderate pulmonary hypertension RVSP 35-40 mmHg.    Nuclear stress test: 3/13/16  Normal nuclear stress test. No evidence of infarction or ischemia. Normal systolic function and EF of 70%. Calculated TID 0.98.    Carotid Doppler: 10/26/15  Mild stenosis of the right internal carotid (< 50%).   Moderate stenosis of the left internal carotid (50-69%).   Compared to the report of the duplex scan 7/30/2013, there is no significant change    Transthoracic echo: 10/12/15  No prior study is available for comparison.    Left ventricular ejection fraction is visually estimated to be 65%. Normal regional wall motion. Grade I diastolic dysfunction.  No significant valve disease or flow abnormalities.   Mild tricuspid regurgitation. Right ventricular systolic pressure is estimated to be 35 mmHg.   The aortic root is normal     EK/21/15  Sinus tachycardia 102 normal axis no significant ST-T wave changes.    Labs: 8/22/15  Sodium 136, potassium 4.4, chloride 102, bicarbonate 30, glucose 170, BUN 9, creatinine 1.19 GFR 43    Carotid Doppler: 13  Moderate 50-69% stenosis left internal carotid artery.  Mild right internal carotid artery disease  Normal vertebral flow  Assessment:     1. CAD status post BMS LCx in   2. Hypertension  3. Dyslipidemia  4. Carotid artery disease    Medical Decision Making:  Today's Assessment / Status / Plan:     1.Stable no complaints of angina.  Continue aspirin 81 mg daily..  2. Blood pressure well controlled at the present visit.  Continue lisinopril 20 mg daily.  3. LDL is 70.  Continue Zocor 20 mg qHs.  4. Carotid artery disease.  Carotid artery Doppler in one year prior to next visit.    Follow-up in one year.  Labs carotid artery Doppler prior to next visit.    Thank you for allowing me to participate in taking care of patient.    Addie Carranza MD.

## 2018-01-18 ENCOUNTER — TELEPHONE (OUTPATIENT)
Dept: MEDICAL GROUP | Facility: PHYSICIAN GROUP | Age: 83
End: 2018-01-18

## 2018-01-18 NOTE — TELEPHONE ENCOUNTER
Pt called stating that she is out of her xanax but would like to know if she can switch to something else.  Pt also stated that she is aware that she needs to leave a urine sample in order to keep getting refills of her xanax but that she stresses out when she is here and cannot leave a sample. I did advise pt that she would need an appointment to discuss new medication but she gets nervous driving and wanted me to send a message about switching to something else anyway. Please advise. Thank you.

## 2018-01-19 ENCOUNTER — TELEPHONE (OUTPATIENT)
Dept: MEDICAL GROUP | Facility: PHYSICIAN GROUP | Age: 83
End: 2018-01-19

## 2018-01-19 NOTE — TELEPHONE ENCOUNTER
Pt called and was requesting for medication change from her Xanax. I advised the pt that she needs to come into the office today or Monday to discuss this change. The pt stated she can't come into the office today, so she made an appt with Dr. Martin 1/22/2018 at 1pm.

## 2018-01-24 ENCOUNTER — OFFICE VISIT (OUTPATIENT)
Dept: MEDICAL GROUP | Facility: PHYSICIAN GROUP | Age: 83
End: 2018-01-24
Payer: MEDICARE

## 2018-01-24 VITALS
SYSTOLIC BLOOD PRESSURE: 132 MMHG | RESPIRATION RATE: 16 BRPM | OXYGEN SATURATION: 89 % | HEART RATE: 76 BPM | WEIGHT: 103 LBS | TEMPERATURE: 98.3 F | DIASTOLIC BLOOD PRESSURE: 70 MMHG | BODY MASS INDEX: 20.12 KG/M2

## 2018-01-24 DIAGNOSIS — I10 ESSENTIAL HYPERTENSION: ICD-10-CM

## 2018-01-24 DIAGNOSIS — Z79.899 CHRONIC USE OF BENZODIAZEPINE FOR THERAPEUTIC PURPOSE: ICD-10-CM

## 2018-01-24 DIAGNOSIS — J44.9 CHRONIC OBSTRUCTIVE PULMONARY DISEASE, UNSPECIFIED COPD TYPE (HCC): ICD-10-CM

## 2018-01-24 DIAGNOSIS — J96.11 CHRONIC RESPIRATORY FAILURE WITH HYPOXIA (HCC): ICD-10-CM

## 2018-01-24 DIAGNOSIS — Z78.0 POST-MENOPAUSAL: ICD-10-CM

## 2018-01-24 DIAGNOSIS — R07.89 OTHER CHEST PAIN: ICD-10-CM

## 2018-01-24 DIAGNOSIS — F41.9 ANXIETY: ICD-10-CM

## 2018-01-24 PROCEDURE — 99214 OFFICE O/P EST MOD 30 MIN: CPT | Performed by: FAMILY MEDICINE

## 2018-01-24 RX ORDER — ALPRAZOLAM 0.5 MG/1
TABLET ORAL
Qty: 45 TAB | Refills: 0 | Status: SHIPPED | OUTPATIENT
Start: 2018-03-24 | End: 2018-04-24

## 2018-01-24 RX ORDER — ALPRAZOLAM 0.5 MG/1
TABLET ORAL
Qty: 45 TAB | Refills: 0 | Status: SHIPPED | OUTPATIENT
Start: 2018-01-24 | End: 2018-01-24 | Stop reason: SDUPTHER

## 2018-01-24 RX ORDER — ALPRAZOLAM 0.5 MG/1
TABLET ORAL
Qty: 45 TAB | Refills: 0 | Status: CANCELLED | OUTPATIENT
Start: 2018-01-24 | End: 2018-02-24

## 2018-01-24 RX ORDER — ALPRAZOLAM 0.5 MG/1
TABLET ORAL
Qty: 45 TAB | Refills: 0 | Status: SHIPPED | OUTPATIENT
Start: 2018-02-24 | End: 2018-01-24 | Stop reason: SDUPTHER

## 2018-01-24 ASSESSMENT — ENCOUNTER SYMPTOMS
COUGH: 0
CARDIOVASCULAR NEGATIVE: 1
NECK PAIN: 0
PALPITATIONS: 0
HEADACHES: 0
HEMOPTYSIS: 0
CONSTIPATION: 0
RESPIRATORY NEGATIVE: 1
FEVER: 0
MUSCULOSKELETAL NEGATIVE: 1
CONSTITUTIONAL NEGATIVE: 1
EYES NEGATIVE: 1
NERVOUS/ANXIOUS: 1
DIZZINESS: 0
CHILLS: 0
NEUROLOGICAL NEGATIVE: 1
MYALGIAS: 0
GASTROINTESTINAL NEGATIVE: 1

## 2018-01-24 NOTE — PROGRESS NOTES
Subjective:      Pat Sevilla is a 85 y.o. female who presents with Hyperlipidemia            1. Anxiety  The patient's current medical issue is well controlled on the current therapy with no new symptoms or worsening  This patient is continuing to use a controlled substance (CS) on a long term basis.  The patient is thoroughly aware of the goals of treatment with the CS  The patient is aware that yearly and random urine drug screens are required.  The patient has been instructed to take the CS only as prescribed.  The patient is prohibited from sharing the CS with any other person.  The patient is instructed to inform the provider if any other CS is taken, of any alcohol or cannabis or other recreational drug use, any treatment for side effects of the CS or complications, if they have CS active rx in other states  The patient has evidence for a reason for the CS  The treatment plan has been discussed with the patient  The  report has been reviewed    - alprazolam (XANAX) 0.5 MG Tab; TAKE ONE-HALF TABLET BY MOUTH EVERY MORNING AND ONE TABLET IN THE EVENING  Dispense: 45 Tab; Refill: 0    2. Essential hypertension  Currently treated for HTN, taking meds with no CP or sob, monitors bp at home periodically. controlled      3. Chronic obstructive pulmonary disease, unspecified COPD type (CMS-HCC)  Patient has a diagnosis of copd and is using their medications and trying to avoid triggers such as colds/smoke/allergens.controlled      4. Chronic respiratory failure with hypoxia (CMS-HCC)  The patient's current medical issue is well controlled on the current therapy with no new symptoms or worsening  Stable on O2 at night    5. 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  The patient's current medical issue is well controlled on the current therapy with no new symptoms or worsening      Past Medical History:  9/15/2014: Anemia      Comment: normocytic  July 2008: CAD (coronary artery disease)      Comment:  s/p Stent of Left LCX x2 with minivision bare                metal stent, Proximal and mid LCX  No date: Cerebrovascular disease  No date: COPD (chronic obstructive pulmonary disease) (*  No date: HTN (hypertension)  No date: Hyperlipidemia  No date: Syncope  Past Surgical History:  No date: CARDIAC CATH, LEFT HEART  Smoking status: Former Smoker                                                              Packs/day: 0.50      Years: 60.00        Types: Cigarettes     Quit date: 2/20/2013  Smokeless tobacco: Never Used                      Alcohol use: No              Review of patient's family history indicates:    Heart Disease                  Mother                    Heart Disease                  Father                    Heart Disease                  Brother                     Current Outpatient Prescriptions: •  (START ON 3/24/2018) alprazolam (XANAX) 0.5 MG Tab, TAKE ONE-HALF TABLET BY MOUTH EVERY MORNING AND ONE TABLET IN THE EVENING, Disp: 45 Tab, Rfl: 0•  simvastatin (ZOCOR) 20 MG Tab, Take 1 Tab by mouth every evening., Disp: 90 Tab, Rfl: 3•  fluoxetine (PROZAC) 10 MG Cap, Take 1 Cap by mouth every day., Disp: 30 Cap, Rfl: 3•  lisinopril (PRINIVIL) 20 MG Tab, Take 1 Tab by mouth every day. TAKE ONE TABLET BY MOUTH DAILY, Disp: 90 Tab, Rfl: 1•  nitroglycerin (NITROSTAT) 0.4 MG SL Tab, Place 1 Tab under tongue as needed for Chest Pain. Not to exceed 3 tablets in 15 minutes, Disp: 25 Tab, Rfl: 1•  Fluticasone-Salmeterol (ADVAIR DISKUS INH), Inhale  by mouth. As directed , Disp: , Rfl: •  Fluticasone Furoate-Vilanterol (BREO ELLIPTA) 200-25 MCG/INH AEROSOL POWDER, BREATH ACTIVATED, Inhale 1 Puff by mouth., Disp: , Rfl: •  doxycycline (VIBRAMYCIN) 100 MG Tab, Take 100 mg by mouth 2 times a day., Disp: , Rfl: •  lidocaine (XYLOCAINE) 5 % Ointment, Apply  to affected area(s) as needed., Disp: , Rfl: •  erythromycin (ERYTHROCIN STEARATE) 250 MG tablet, Take 250 mg by mouth 3 times a day., Disp: , Rfl: •   aspirin EC (ECOTRIN) 81 MG TBEC, Take 81 mg by mouth every day. Indications: Heart Attack, Disp: , Rfl: •  albuterol (VENTOLIN OR PROVENTIL) 108 (90 BASE) MCG/ACT AERS, Inhale 2 Puffs by mouth every 6 hours as needed. ud , Disp: , Rfl: •  tiotropium (SPIRIVA HANDIHALER) 18 MCG CAPS, Inhale 18 mcg by mouth every day., Disp: , Rfl:     Patient was instructed on the use of medications, either prescriptions or OTC and informed on when the appropriate follow up time period should be. In addition, patient was also instructed that should any acute worsening occur that they should notify this clinic asap or call 911.            Review of Systems   Constitutional: Negative.  Negative for chills and fever.        Past Medical History:  9/15/2014: Anemia      Comment: normocytic  July 2008: CAD (coronary artery disease)      Comment: s/p Stent of Left LCX x2 with minivision bare                metal stent, Proximal and mid LCX  No date: Cerebrovascular disease  No date: COPD (chronic obstructive pulmonary disease) (*  No date: HTN (hypertension)  No date: Hyperlipidemia  No date: Syncope  Past Surgical History:  No date: CARDIAC CATH, LEFT HEART  Smoking status: Former Smoker                                                              Packs/day: 0.50      Years: 60.00        Types: Cigarettes     Quit date: 2/20/2013  Smokeless tobacco: Never Used                      Alcohol use: No              Review of patient's family history indicates:    Heart Disease                  Mother                    Heart Disease                  Father                    Heart Disease                  Brother                    HENT: Negative.    Eyes: Negative.    Respiratory: Negative.  Negative for cough and hemoptysis.    Cardiovascular: Negative.  Negative for chest pain and palpitations.   Gastrointestinal: Negative.  Negative for constipation.   Genitourinary: Negative.  Negative for dysuria and urgency.   Musculoskeletal: Negative.   Negative for myalgias and neck pain.   Skin: Negative.  Negative for rash.   Neurological: Negative.  Negative for dizziness and headaches.   Endo/Heme/Allergies: Negative.    Psychiatric/Behavioral: Negative for suicidal ideas. The patient is nervous/anxious.           Objective:     /70   Pulse 76   Temp 36.8 °C (98.3 °F)   Resp 16   Wt 46.7 kg (103 lb)   SpO2 89%   BMI 20.12 kg/m²      Physical Exam   Constitutional: She is oriented to person, place, and time. She appears well-developed and well-nourished. No distress.   HENT:   Head: Normocephalic and atraumatic.   Eyes: Pupils are equal, round, and reactive to light.   Neck: Normal range of motion. Neck supple.   Cardiovascular: Normal rate and regular rhythm.    No murmur heard.  Pulmonary/Chest: Effort normal and breath sounds normal. No respiratory distress. She has no wheezes. She has no rales. She exhibits no tenderness.   Abdominal: Soft. Bowel sounds are normal. She exhibits no distension. There is no tenderness.   Musculoskeletal: Normal range of motion.   Neurological: She is alert and oriented to person, place, and time. She has normal reflexes. No cranial nerve deficit.   Skin: Skin is warm and dry. She is not diaphoretic.   Psychiatric: She has a normal mood and affect. Her behavior is normal. Judgment and thought content normal.   Nursing note and vitals reviewed.              Assessment/Plan:     1. Anxiety    - alprazolam (XANAX) 0.5 MG Tab; TAKE ONE-HALF TABLET BY MOUTH EVERY MORNING AND ONE TABLET IN THE EVENING  Dispense: 45 Tab; Refill: 0    2. Essential hypertension      3. Chronic obstructive pulmonary disease, unspecified COPD type (CMS-HCC)      4. Chronic respiratory failure with hypoxia (CMS-[facility])      5. 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

## 2018-02-03 DIAGNOSIS — F34.1 DYSTHYMIA: ICD-10-CM

## 2018-02-05 RX ORDER — FLUOXETINE 10 MG/1
CAPSULE ORAL
Qty: 30 CAP | Refills: 2 | Status: SHIPPED | OUTPATIENT
Start: 2018-02-05 | End: 2018-05-14 | Stop reason: SDUPTHER

## 2018-02-05 NOTE — TELEPHONE ENCOUNTER
Was the patient seen in the last year in this department? Yes     Does patient have an active prescription for medications requested? No     Received Request Via: Pharmacy     Last Visit: 01/24/18  Last Labs: 09/08/17  Next Appt: 04/24/18

## 2018-02-13 RX ORDER — LISINOPRIL 20 MG/1
TABLET ORAL
Qty: 90 TAB | Refills: 0 | Status: ON HOLD | OUTPATIENT
Start: 2018-02-13 | End: 2019-07-10

## 2018-02-13 NOTE — TELEPHONE ENCOUNTER
Was the patient seen in the last year in this department? Yes     Does patient have an active prescription for medications requested? Yes     Received Request Via: Pharmacy     Last visit 1/24/2018  Last labs 9/8/2017

## 2018-02-17 LAB
ALBUMIN SERPL-MCNC: 4.3 G/DL (ref 3.5–4.7)
ALBUMIN/GLOB SERPL: 1.8 {RATIO} (ref 1.2–2.2)
ALP SERPL-CCNC: 87 IU/L (ref 39–117)
ALT SERPL-CCNC: 7 IU/L (ref 0–32)
AST SERPL-CCNC: 18 IU/L (ref 0–40)
BILIRUB SERPL-MCNC: 0.7 MG/DL (ref 0–1.2)
BUN SERPL-MCNC: 11 MG/DL (ref 8–27)
BUN/CREAT SERPL: 12 (ref 12–28)
CALCIUM SERPL-MCNC: 9.4 MG/DL (ref 8.7–10.3)
CHLORIDE SERPL-SCNC: 102 MMOL/L (ref 96–106)
CHOLEST SERPL-MCNC: 161 MG/DL (ref 100–199)
CO2 SERPL-SCNC: 26 MMOL/L (ref 18–29)
CREAT SERPL-MCNC: 0.95 MG/DL (ref 0.57–1)
GFR SERPLBLD CREATININE-BSD FMLA CKD-EPI: 55 ML/MIN/1.73
GFR SERPLBLD CREATININE-BSD FMLA CKD-EPI: 63 ML/MIN/1.73
GLOBULIN SER CALC-MCNC: 2.4 G/DL (ref 1.5–4.5)
GLUCOSE SERPL-MCNC: 98 MG/DL (ref 65–99)
HDLC SERPL-MCNC: 72 MG/DL
LABORATORY COMMENT REPORT: NORMAL
LDLC SERPL CALC-MCNC: 74 MG/DL (ref 0–99)
POTASSIUM SERPL-SCNC: 4.5 MMOL/L (ref 3.5–5.2)
PROT SERPL-MCNC: 6.7 G/DL (ref 6–8.5)
SODIUM SERPL-SCNC: 143 MMOL/L (ref 134–144)
TRIGL SERPL-MCNC: 74 MG/DL (ref 0–149)
VLDLC SERPL CALC-MCNC: 15 MG/DL (ref 5–40)

## 2018-04-24 ENCOUNTER — OFFICE VISIT (OUTPATIENT)
Dept: MEDICAL GROUP | Facility: PHYSICIAN GROUP | Age: 83
End: 2018-04-24
Payer: MEDICARE

## 2018-04-24 VITALS
TEMPERATURE: 98 F | RESPIRATION RATE: 14 BRPM | BODY MASS INDEX: 19.92 KG/M2 | OXYGEN SATURATION: 95 % | HEART RATE: 78 BPM | DIASTOLIC BLOOD PRESSURE: 50 MMHG | SYSTOLIC BLOOD PRESSURE: 130 MMHG | WEIGHT: 102 LBS

## 2018-04-24 DIAGNOSIS — J44.9 CHRONIC OBSTRUCTIVE PULMONARY DISEASE, UNSPECIFIED COPD TYPE (HCC): ICD-10-CM

## 2018-04-24 DIAGNOSIS — D50.8 OTHER IRON DEFICIENCY ANEMIA: ICD-10-CM

## 2018-04-24 DIAGNOSIS — J96.11 CHRONIC RESPIRATORY FAILURE WITH HYPOXIA (HCC): ICD-10-CM

## 2018-04-24 DIAGNOSIS — I10 ESSENTIAL HYPERTENSION: ICD-10-CM

## 2018-04-24 PROCEDURE — 99214 OFFICE O/P EST MOD 30 MIN: CPT | Performed by: FAMILY MEDICINE

## 2018-04-24 RX ORDER — LISINOPRIL 40 MG/1
40 TABLET ORAL DAILY
Qty: 30 TAB | Refills: 3 | Status: SHIPPED | OUTPATIENT
Start: 2018-04-24 | End: 2018-08-08

## 2018-04-24 RX ORDER — LISINOPRIL 40 MG/1
40 TABLET ORAL DAILY
COMMUNITY
End: 2018-04-24 | Stop reason: SDUPTHER

## 2018-04-24 RX ORDER — AMLODIPINE BESYLATE 5 MG/1
5 TABLET ORAL DAILY
Qty: 30 TAB | Refills: 3 | Status: SHIPPED | OUTPATIENT
Start: 2018-04-24 | End: 2018-08-08

## 2018-04-24 RX ORDER — AMOXICILLIN 500 MG/1
500 CAPSULE ORAL 3 TIMES DAILY
Qty: 30 CAP | Refills: 0 | Status: SHIPPED | OUTPATIENT
Start: 2018-04-24 | End: 2018-08-08

## 2018-04-24 RX ORDER — FLUCONAZOLE 100 MG/1
100 TABLET ORAL DAILY
Qty: 3 TAB | Refills: 0 | Status: SHIPPED | OUTPATIENT
Start: 2018-04-24 | End: 2018-08-08

## 2018-04-24 ASSESSMENT — ENCOUNTER SYMPTOMS
SHORTNESS OF BREATH: 1
CARDIOVASCULAR NEGATIVE: 1
MYALGIAS: 0
MUSCULOSKELETAL NEGATIVE: 1
NEUROLOGICAL NEGATIVE: 1
GASTROINTESTINAL NEGATIVE: 1
EYES NEGATIVE: 1
FEVER: 0
NECK PAIN: 0
PSYCHIATRIC NEGATIVE: 1
COUGH: 0
PALPITATIONS: 0
DIZZINESS: 0
HEMOPTYSIS: 0
CHILLS: 0
CONSTIPATION: 0
HEADACHES: 0

## 2018-04-24 NOTE — PROGRESS NOTES
Subjective:      Pat Sevilla is a 85 y.o. female who presents with Rib Pain            1. Chronic obstructive pulmonary disease, unspecified COPD type (CMS-HCC)  Uses O2 at 2 LNC 24/7  Worse cough past few days with some wheezes  Will continue with other inhalers and meds and treat  - amoxicillin (AMOXIL) 500 MG Cap; Take 1 Cap by mouth 3 times a day.  Dispense: 30 Cap; Refill: 0  - fluconazole (DIFLUCAN) 100 MG Tab; Take 1 Tab by mouth every day.  Dispense: 3 Tab; Refill: 0    2. Essential hypertension  Went to er last week for elevated bp, started on higher dose of ace and now much better bp  Will continue with current dosages  - lisinopril (PRINIVIL, ZESTRIL) 40 MG tablet; Take 1 Tab by mouth every day.  Dispense: 30 Tab; Refill: 3  - amLODIPine (NORVASC) 5 MG Tab; Take 1 Tab by mouth every day.  Dispense: 30 Tab; Refill: 3    3. Chronic respiratory failure with hypoxia (CMS-HCC)  The patient's current medical issue is well controlled on the current therapy with no new symptoms or worsening      4. Other iron deficiency anemia  Labs from dr. johnson show Hg of 10 and low iron, likely some component of cri in her anemia but will increase iron pills from every other day to every day, repeat in 6 weeks and hra after  - IRON/TOTAL IRON BIND; Future  - CBC WITH DIFFERENTIAL; Future    Past Medical History:  9/15/2014: Anemia      Comment: normocytic  July 2008: CAD (coronary artery disease)      Comment: s/p Stent of Left LCX x2 with minivision bare                metal stent, Proximal and mid LCX  No date: Cerebrovascular disease  No date: COPD (chronic obstructive pulmonary disease) (*  No date: HTN (hypertension)  No date: Hyperlipidemia  No date: Syncope  Past Surgical History:  No date: CARDIAC CATH, LEFT HEART  Smoking status: Former Smoker                                                              Packs/day: 0.50      Years: 60.00        Types: Cigarettes     Quit date: 2/20/2013  Smokeless tobacco: Never  Used                      Alcohol use: No              Review of patient's family history indicates:    Heart Disease                  Mother                    Heart Disease                  Father                    Heart Disease                  Brother                     Current Outpatient Prescriptions: •  lisinopril (PRINIVIL, ZESTRIL) 40 MG tablet, Take 1 Tab by mouth every day., Disp: 30 Tab, Rfl: 3•  amLODIPine (NORVASC) 5 MG Tab, Take 1 Tab by mouth every day., Disp: 30 Tab, Rfl: 3•  amoxicillin (AMOXIL) 500 MG Cap, Take 1 Cap by mouth 3 times a day., Disp: 30 Cap, Rfl: 0•  fluconazole (DIFLUCAN) 100 MG Tab, Take 1 Tab by mouth every day., Disp: 3 Tab, Rfl: 0•  lisinopril (PRINIVIL) 20 MG Tab, TAKE ONE TABLET BY MOUTH DAILY, Disp: 90 Tab, Rfl: 0•  FLUoxetine (PROZAC) 10 MG Cap, TAKE ONE CAPSULE BY MOUTH DAILY, Disp: 30 Cap, Rfl: 2•  alprazolam (XANAX) 0.5 MG Tab, TAKE ONE-HALF TABLET BY MOUTH EVERY MORNING AND ONE TABLET IN THE EVENING, Disp: 45 Tab, Rfl: 0•  Fluticasone Furoate-Vilanterol (BREO ELLIPTA) 200-25 MCG/INH AEROSOL POWDER, BREATH ACTIVATED, Inhale 1 Puff by mouth., Disp: , Rfl: •  nitroglycerin (NITROSTAT) 0.4 MG SL Tab, Place 1 Tab under tongue as needed for Chest Pain. Not to exceed 3 tablets in 15 minutes, Disp: 25 Tab, Rfl: 1•  doxycycline (VIBRAMYCIN) 100 MG Tab, Take 100 mg by mouth 2 times a day., Disp: , Rfl: •  lidocaine (XYLOCAINE) 5 % Ointment, Apply  to affected area(s) as needed., Disp: , Rfl: •  erythromycin (ERYTHROCIN STEARATE) 250 MG tablet, Take 250 mg by mouth 3 times a day., Disp: , Rfl: •  aspirin EC (ECOTRIN) 81 MG TBEC, Take 81 mg by mouth every day. Indications: Heart Attack, Disp: , Rfl: •  Fluticasone-Salmeterol (ADVAIR DISKUS INH), Inhale  by mouth. As directed , Disp: , Rfl: •  albuterol (VENTOLIN OR PROVENTIL) 108 (90 BASE) MCG/ACT AERS, Inhale 2 Puffs by mouth every 6 hours as needed. ud , Disp: , Rfl: •  tiotropium (SPIRIVA HANDIHALER) 18 MCG CAPS, Inhale 18 mcg  by mouth every day., Disp: , Rfl:     Patient was instructed on the use of medications, either prescriptions or OTC and informed on when the appropriate follow up time period should be. In addition, patient was also instructed that should any acute worsening occur that they should notify this clinic asap or call 911.            Review of Systems   Constitutional: Positive for malaise/fatigue. Negative for chills and fever.        Past Medical History:  9/15/2014: Anemia      Comment: normocytic  July 2008: CAD (coronary artery disease)      Comment: s/p Stent of Left LCX x2 with minivision bare                metal stent, Proximal and mid LCX  No date: Cerebrovascular disease  No date: COPD (chronic obstructive pulmonary disease) (*  No date: HTN (hypertension)  No date: Hyperlipidemia  No date: Syncope  Past Surgical History:  No date: CARDIAC CATH, LEFT HEART  Smoking status: Former Smoker                                                              Packs/day: 0.50      Years: 60.00        Types: Cigarettes     Quit date: 2/20/2013  Smokeless tobacco: Never Used                      Alcohol use: No              Review of patient's family history indicates:    Heart Disease                  Mother                    Heart Disease                  Father                    Heart Disease                  Brother                    HENT: Negative.    Eyes: Negative.    Respiratory: Positive for shortness of breath. Negative for cough and hemoptysis.    Cardiovascular: Negative.  Negative for chest pain and palpitations.   Gastrointestinal: Negative.  Negative for constipation.   Genitourinary: Negative.  Negative for dysuria and urgency.   Musculoskeletal: Negative.  Negative for myalgias and neck pain.   Skin: Negative.  Negative for rash.   Neurological: Negative.  Negative for dizziness and headaches.   Endo/Heme/Allergies: Negative.    Psychiatric/Behavioral: Negative.  Negative for suicidal ideas.           Objective:     /50   Pulse 78   Temp 36.7 °C (98 °F)   Resp 14   Wt 46.3 kg (102 lb)   SpO2 95%   BMI 19.92 kg/m²      Physical Exam   Constitutional: She is oriented to person, place, and time. She appears well-developed and well-nourished. No distress.   HENT:   Head: Normocephalic and atraumatic.   Eyes: Pupils are equal, round, and reactive to light.   Neck: Normal range of motion. Neck supple.   Cardiovascular: Normal rate and regular rhythm.    No murmur heard.  Pulmonary/Chest: Effort normal and breath sounds normal. No respiratory distress. She has no wheezes. She has no rales. She exhibits no tenderness.   Patient today has a loose cough but on auscultation has no evident wheezes,rales or rhonchi  Healing ecchymosis on arms from iv's from er   Abdominal: Soft. Bowel sounds are normal. She exhibits no distension. There is no tenderness.   Musculoskeletal: Normal range of motion.   Neurological: She is alert and oriented to person, place, and time. She has normal reflexes. No cranial nerve deficit.   Skin: Skin is warm and dry. She is not diaphoretic.   Psychiatric: She has a normal mood and affect. Her behavior is normal. Judgment and thought content normal.   Nursing note and vitals reviewed.              Assessment/Plan:     1. Chronic obstructive pulmonary disease, unspecified COPD type (CMS-HCC)    - amoxicillin (AMOXIL) 500 MG Cap; Take 1 Cap by mouth 3 times a day.  Dispense: 30 Cap; Refill: 0  - fluconazole (DIFLUCAN) 100 MG Tab; Take 1 Tab by mouth every day.  Dispense: 3 Tab; Refill: 0    2. Essential hypertension    - lisinopril (PRINIVIL, ZESTRIL) 40 MG tablet; Take 1 Tab by mouth every day.  Dispense: 30 Tab; Refill: 3  - amLODIPine (NORVASC) 5 MG Tab; Take 1 Tab by mouth every day.  Dispense: 30 Tab; Refill: 3    3. Chronic respiratory failure with hypoxia (CMS-HCC)      4. Other iron deficiency anemia  - IRON/TOTAL IRON BIND; Future  - CBC WITH DIFFERENTIAL; Future

## 2018-05-01 ENCOUNTER — TELEPHONE (OUTPATIENT)
Dept: MEDICAL GROUP | Facility: PHYSICIAN GROUP | Age: 83
End: 2018-05-01

## 2018-05-01 NOTE — TELEPHONE ENCOUNTER
Pt called stating that she is currently taking 30 mg lisinopril and her bp for today os 113/39, 113/36, 114/42, 127/50. She wanted to know if she could still go to her hair appt. [facility] for pt to return call to advise its okay.

## 2018-05-04 ENCOUNTER — TELEPHONE (OUTPATIENT)
Dept: MEDICAL GROUP | Facility: PHYSICIAN GROUP | Age: 83
End: 2018-05-04

## 2018-05-04 NOTE — TELEPHONE ENCOUNTER
"Pt called and stated that she has been taking the 20 mg lisinopril still and that she has been taking only 20-30 mg because when she takes 40 mg it drops her blood pressure \"too low\". She stated that her bp has been 128/62 and 123/46 when she takes 30 mg and 138/53 when she just takes the 20 mg tab. She wanted to know if it's okay for her to continue taking 20 mg.  "

## 2018-05-14 DIAGNOSIS — F34.1 DYSTHYMIA: ICD-10-CM

## 2018-05-14 RX ORDER — FLUOXETINE 10 MG/1
CAPSULE ORAL
Qty: 90 CAP | Refills: 1 | Status: SHIPPED | OUTPATIENT
Start: 2018-05-14 | End: 2018-08-08

## 2018-05-21 DIAGNOSIS — Z76.0 MEDICATION REFILL: ICD-10-CM

## 2018-05-22 RX ORDER — ALPRAZOLAM 0.5 MG/1
0.5 TABLET ORAL NIGHTLY PRN
Qty: 45 TAB | Refills: 0 | Status: SHIPPED | OUTPATIENT
Start: 2018-05-22 | End: 2018-05-29 | Stop reason: SDUPTHER

## 2018-05-29 ENCOUNTER — TELEPHONE (OUTPATIENT)
Dept: MEDICAL GROUP | Facility: PHYSICIAN GROUP | Age: 83
End: 2018-05-29

## 2018-05-29 DIAGNOSIS — Z76.0 MEDICATION REFILL: ICD-10-CM

## 2018-05-29 DIAGNOSIS — F41.9 ANXIETY: ICD-10-CM

## 2018-05-29 RX ORDER — ALPRAZOLAM 0.5 MG/1
0.5 TABLET ORAL NIGHTLY PRN
COMMUNITY
End: 2018-05-29 | Stop reason: SDUPTHER

## 2018-05-29 RX ORDER — ALPRAZOLAM 0.5 MG/1
0.5 TABLET ORAL NIGHTLY PRN
Qty: 45 TAB | Refills: 0 | Status: SHIPPED | OUTPATIENT
Start: 2018-05-29 | End: 2018-06-28

## 2018-05-29 RX ORDER — ALPRAZOLAM 0.5 MG/1
TABLET ORAL
Qty: 45 TAB | Refills: 0 | Status: SHIPPED | OUTPATIENT
Start: 2018-05-29 | End: 2018-07-09 | Stop reason: SDUPTHER

## 2018-05-29 NOTE — TELEPHONE ENCOUNTER
Patient called and said that her script has not been sent to the pharmacy, per the pharmacy.  She called on 5/24 to have her Alprazolam faxed to [facility].

## 2018-06-22 LAB
BASOPHILS # BLD AUTO: 0 X10E3/UL (ref 0–0.2)
BASOPHILS NFR BLD AUTO: 1 %
EOSINOPHIL # BLD AUTO: 0.2 X10E3/UL (ref 0–0.4)
EOSINOPHIL NFR BLD AUTO: 4 %
ERYTHROCYTE [DISTWIDTH] IN BLOOD BY AUTOMATED COUNT: 15.1 % (ref 12.3–15.4)
HCT VFR BLD AUTO: 31.5 % (ref 34–46.6)
HGB BLD-MCNC: 10.5 G/DL (ref 11.1–15.9)
IMM GRANULOCYTES # BLD: 0 X10E3/UL (ref 0–0.1)
IMM GRANULOCYTES NFR BLD: 0 %
IMMATURE CELLS  115398: ABNORMAL
IRON SATN MFR SERPL: 13 % (ref 15–55)
IRON SERPL-MCNC: 46 UG/DL (ref 27–139)
LYMPHOCYTES # BLD AUTO: 1 X10E3/UL (ref 0.7–3.1)
LYMPHOCYTES NFR BLD AUTO: 18 %
MCH RBC QN AUTO: 27.3 PG (ref 26.6–33)
MCHC RBC AUTO-ENTMCNC: 33.3 G/DL (ref 31.5–35.7)
MCV RBC AUTO: 82 FL (ref 79–97)
MONOCYTES # BLD AUTO: 0.5 X10E3/UL (ref 0.1–0.9)
MONOCYTES NFR BLD AUTO: 10 %
MORPHOLOGY BLD-IMP: ABNORMAL
NEUTROPHILS # BLD AUTO: 3.6 X10E3/UL (ref 1.4–7)
NEUTROPHILS NFR BLD AUTO: 67 %
NRBC BLD AUTO-RTO: ABNORMAL %
PLATELET # BLD AUTO: 320 X10E3/UL (ref 150–379)
RBC # BLD AUTO: 3.85 X10E6/UL (ref 3.77–5.28)
TIBC SERPL-MCNC: 343 UG/DL (ref 250–450)
UIBC SERPL-MCNC: 297 UG/DL (ref 118–369)
WBC # BLD AUTO: 5.3 X10E3/UL (ref 3.4–10.8)

## 2018-06-25 ENCOUNTER — TELEPHONE (OUTPATIENT)
Dept: MEDICAL GROUP | Facility: PHYSICIAN GROUP | Age: 83
End: 2018-06-25

## 2018-06-25 NOTE — TELEPHONE ENCOUNTER
Phone Number Called: [phone] (home)       Message: left vm relaying Dr's message    Left Message for patient to call back: no

## 2018-06-25 NOTE — TELEPHONE ENCOUNTER
----- Message from Florian Henley M.D. sent at 6/25/2018  8:43 AM PDT -----  Continue with iron supplementation

## 2018-07-09 ENCOUNTER — OFFICE VISIT (OUTPATIENT)
Dept: MEDICAL GROUP | Facility: PHYSICIAN GROUP | Age: 83
End: 2018-07-09
Payer: MEDICARE

## 2018-07-09 VITALS
HEART RATE: 91 BPM | HEIGHT: 62 IN | TEMPERATURE: 98.3 F | RESPIRATION RATE: 16 BRPM | BODY MASS INDEX: 18.58 KG/M2 | OXYGEN SATURATION: 88 % | SYSTOLIC BLOOD PRESSURE: 130 MMHG | WEIGHT: 101 LBS | DIASTOLIC BLOOD PRESSURE: 60 MMHG

## 2018-07-09 DIAGNOSIS — F41.9 ANXIETY: ICD-10-CM

## 2018-07-09 PROCEDURE — 99214 OFFICE O/P EST MOD 30 MIN: CPT | Performed by: FAMILY MEDICINE

## 2018-07-09 RX ORDER — ALPRAZOLAM 0.5 MG/1
TABLET ORAL
Qty: 45 TAB | Refills: 0 | Status: SHIPPED | OUTPATIENT
Start: 2018-08-09 | End: 2018-07-09 | Stop reason: SDUPTHER

## 2018-07-09 RX ORDER — ALPRAZOLAM 0.5 MG/1
TABLET ORAL
Qty: 45 TAB | Refills: 0 | Status: SHIPPED | OUTPATIENT
Start: 2018-09-09 | End: 2018-11-15 | Stop reason: SDUPTHER

## 2018-07-09 RX ORDER — ALPRAZOLAM 0.5 MG/1
TABLET ORAL
Qty: 45 TAB | Refills: 0 | Status: SHIPPED | OUTPATIENT
Start: 2018-07-09 | End: 2018-07-09 | Stop reason: SDUPTHER

## 2018-07-09 NOTE — PROGRESS NOTES
Chief Complaint   Patient presents with   • Anxiety       HISTORY OF PRESENT ILLNESS: Patient is a 85 y.o. female established patient who presents today for a med refill of a controlled substance in addition to their other issues listed in the rest of the progress note    The patient is requesting medication for the following issue:anxiety  Approximate date of onset of condition was years ago.    Current Problems:    Anxiety disorder      Patient Active Problem List    Diagnosis Date Noted   • CAD (coronary artery disease)      Priority: High   • COPD (chronic obstructive pulmonary disease) ([facility])      Priority: High   • Hyperlipemia      Priority: High   • HTN (hypertension)      Priority: High   • Hyperlipidemia      Priority: High   • Bilateral carotid artery disease ([facility]) 12/06/2017   • CRI (chronic renal insufficiency) 09/12/2017   • Chronic respiratory failure with hypoxia ([facility]) 08/09/2017       Allergies:Codeine    Current Outpatient Prescriptions   Medication Sig Dispense Refill   • [START ON 9/9/2018] ALPRAZolam (XANAX) 0.5 MG Tab Take 1/2 tab in the AM and 1 tab in the PM 45 Tab 0   • FLUoxetine (PROZAC) 10 MG Cap TAKE ONE CAPSULE BY MOUTH DAILY 90 Cap 1   • lisinopril (PRINIVIL) 20 MG Tab TAKE ONE TABLET BY MOUTH DAILY 90 Tab 0   • Fluticasone Furoate-Vilanterol (BREO ELLIPTA) 200-25 MCG/INH AEROSOL POWDER, BREATH ACTIVATED Inhale 1 Puff by mouth.     • erythromycin (ERYTHROCIN STEARATE) 250 MG tablet Take 250 mg by mouth 3 times a day.     • aspirin EC (ECOTRIN) 81 MG TBEC Take 81 mg by mouth every day. Indications: Heart Attack     • tiotropium (SPIRIVA HANDIHALER) 18 MCG CAPS Inhale 18 mcg by mouth every day.     • lisinopril (PRINIVIL, ZESTRIL) 40 MG tablet Take 1 Tab by mouth every day. 30 Tab 3   • amLODIPine (NORVASC) 5 MG Tab Take 1 Tab by mouth every day. 30 Tab 3   • amoxicillin (AMOXIL) 500 MG Cap Take 1 Cap by mouth 3 times a day. 30 Cap 0   • fluconazole (DIFLUCAN) 100 MG Tab Take 1 Tab by  mouth every day. 3 Tab 0   • nitroglycerin (NITROSTAT) 0.4 MG SL Tab Place 1 Tab under tongue as needed for Chest Pain. Not to exceed 3 tablets in 15 minutes 25 Tab 1   • doxycycline (VIBRAMYCIN) 100 MG Tab Take 100 mg by mouth 2 times a day.     • lidocaine (XYLOCAINE) 5 % Ointment Apply  to affected area(s) as needed.     • Fluticasone-Salmeterol (ADVAIR DISKUS INH) Inhale  by mouth. As directed      • albuterol (VENTOLIN OR PROVENTIL) 108 (90 BASE) MCG/ACT AERS Inhale 2 Puffs by mouth every 6 hours as needed. ud        No current facility-administered medications for this visit.          I have discussed with the patient that with any controlled substance prescription it is vital to have these medications in a safe, secure environment. I have discussed that it is their responsibility that their medications are not accessible to anyone else who may use them inadvertently.    I have discussed with the patient that any controlled substance can impair the ability to drive or operate any machinery and that the patient should not use them if they plan on driving or operating machinery.    I have reviewed and updated the past medical/surgical, family history and social history as of today.    ROS:  Review of Systems   Constitutional: Negative for fever, chills, weight loss and malaise/fatigue.   Respiratory: Negative for cough, sputum production, shortness of breath and wheezing.    Cardiovascular: Negative for chest pain, palpitations, orthopnea and leg swelling.   Gastrointestinal: Negative for heartburn, nausea, vomiting, constipation and abdominal pain.  Musculoskeletal:  neg  Skin: Negative for rash and itching.   Neurological: neg  Psychiatric/Behavioral: Negative for signs or symptoms of depression,, suicidal or homicidal ideation.  Patient able to contract for their safety.  + anxiety  All other systems reviewed and are negative except as in HPI.      Exam:  Blood pressure 130/60, pulse 91, temperature 36.8 °C  "(98.3 °F), resp. rate 16, height 1.575 m (5' 2\"), weight 45.8 kg (101 lb), SpO2 88 %.  General:  female patient who appears in no distress        DISCUSSION OF CARE PLAN:    - I discussed management options. I reviewed symptomatic care     - I will obtain/review additional records if needed    - I discussed efforts with home exercise program and activity modification     - I reviewed medication monitoring.       The patient was advised to avoid alcohol use with prescribed medication. I counseled the patient regarding risks, side effects, and interactions of medications. I counseled the patient on the controlled substance prescribing program. I reviewed further symptomatic medications.  - I reviewed additional diagnostic options: Yes.  - I reviewed additional therapeutic options: Yes  - I reviewed psychosocial interventions:  Yes      Assessment/Plan:  1. Anxiety  ALPRAZolam (XANAX) 0.5 MG Tab    Controlled Substance Treatment Agreement    DISCONTINUED: ALPRAZolam (XANAX) 0.5 MG Tab    DISCONTINUED: ALPRAZolam (XANAX) 0.5 MG Tab    CANCELED: Controlled Substance Treatment Agreement       A Controlled Substance Agreement has been signed within the last year. A urine drug screen has been done in the past year.      The patient reports that their insomnia is well controlled with the current treatment plan  They were counseled on other means to help with sleep   This patient is continuing to use a controlled substance (CS) on a long term basis.  The patient is thoroughly aware of the goals of treatment with the CS  The patient is aware that yearly and random urine drug screens are required.  The patient has been instructed to take the CS only as prescribed.  The patient is prohibited from sharing the CS with any other person.  The patient is instructed to inform the provider if any other CS is taken, of any alcohol or cannabis or other recreational drug use, any treatment for side effects of the CS or complications, if " they have CS active rx in other states  The patient has evidence for a reason for the CS  The treatment plan has been discussed with the patient  The  report has been reviewed      Slipped at home and had a fall last week, went to er and 3 sutures in right forehead, theses were removed today with no difficulty and otherwise healing well from other ecchymosis

## 2018-08-08 ENCOUNTER — OFFICE VISIT (OUTPATIENT)
Dept: CARDIOLOGY | Facility: PHYSICIAN GROUP | Age: 83
End: 2018-08-08
Payer: MEDICARE

## 2018-08-08 VITALS
HEART RATE: 82 BPM | DIASTOLIC BLOOD PRESSURE: 60 MMHG | BODY MASS INDEX: 18.77 KG/M2 | HEIGHT: 62 IN | WEIGHT: 102 LBS | SYSTOLIC BLOOD PRESSURE: 138 MMHG | OXYGEN SATURATION: 92 %

## 2018-08-08 DIAGNOSIS — J96.11 CHRONIC RESPIRATORY FAILURE WITH HYPOXIA (HCC): ICD-10-CM

## 2018-08-08 DIAGNOSIS — E78.2 MIXED HYPERLIPIDEMIA: ICD-10-CM

## 2018-08-08 DIAGNOSIS — J44.9 CHRONIC OBSTRUCTIVE PULMONARY DISEASE, UNSPECIFIED COPD TYPE (HCC): ICD-10-CM

## 2018-08-08 DIAGNOSIS — I25.10 CORONARY ARTERY DISEASE INVOLVING NATIVE CORONARY ARTERY OF NATIVE HEART WITHOUT ANGINA PECTORIS: ICD-10-CM

## 2018-08-08 DIAGNOSIS — I10 ESSENTIAL HYPERTENSION: ICD-10-CM

## 2018-08-08 DIAGNOSIS — F41.9 ANXIETY: ICD-10-CM

## 2018-08-08 PROCEDURE — 99214 OFFICE O/P EST MOD 30 MIN: CPT | Performed by: NURSE PRACTITIONER

## 2018-08-08 RX ORDER — SIMVASTATIN 20 MG
20 TABLET ORAL NIGHTLY
COMMUNITY
End: 2019-03-06

## 2018-08-08 RX ORDER — NITROGLYCERIN 0.4 MG/1
0.4 TABLET SUBLINGUAL PRN
Qty: 25 TAB | Refills: 1 | Status: SHIPPED | OUTPATIENT
Start: 2018-08-08 | End: 2019-03-07 | Stop reason: SDUPTHER

## 2018-08-08 ASSESSMENT — ENCOUNTER SYMPTOMS
HEADACHES: 0
FEVER: 0
PALPITATIONS: 0
COUGH: 0
SHORTNESS OF BREATH: 1
PND: 0
ORTHOPNEA: 0
BRUISES/BLEEDS EASILY: 0
DIZZINESS: 0
LOSS OF CONSCIOUSNESS: 0
MYALGIAS: 0
NAUSEA: 0
CHILLS: 0
ABDOMINAL PAIN: 0

## 2018-08-08 NOTE — LETTER
[facility]   [address]  [place] [address]  Phone: [phone]  Fax: [phone]              Pat Sevilla  8/31/1932    Encounter Date: 8/8/2018    RAMIN Weeks          PROGRESS NOTE:  Chief Complaint   Patient presents with   • Follow-Up   • Coronary Artery Disease   • HTN (Controlled)   • Hyperlipidemia       Subjective:   Pat Sevilla is a 85 y.o. female who presents today for 8 month follow-up of CAD, hypertension and hyperlipidemia.    Pat is a 85 year old female with history of CAD, status post remote bare metal stents x 2 to the LCx in 2008, hypertension, hyperlipidemia, and COPD, previously followed by Dr. Parks.    Since the last visit in December 2017, she has been doing well. She has had a few low respiratory infections, treated with antibiotics; breathing is currently stable with all the smoke. She does use oxygen around the clock. Very rare angina, maybe once a month she takes 1 NTG with relief. No palpitations or fluttering of her heart. No orthopnea or PND; no dizziness or syncope; no edema. She did recently trip while [place] a casino (no syncope), and had to have a few stitches in her forehead; it has healed nicely.    Past Medical History:   Diagnosis Date   • Anemia 9/15/2014    normocytic   • CAD (coronary artery disease) 07/2008     PCI/BMS x 2 to the LCx, mid (MiniVision 2.25 x 12mm) and proximal (MiniVision 2.0 x 12mm). March 2016: MPI with no ischemia or infarct, LVEF 70%.   • Cerebrovascular disease    • COPD (chronic obstructive pulmonary disease) ([facility])     Followed by pulmonology   • HTN (hypertension)    • Hyperlipidemia    • Syncope      Past Surgical History:   Procedure Laterality Date   • CARDIAC CATH, LEFT HEART       Family History   Problem Relation Age of Onset   • Heart Disease Mother    • Heart Disease Father    • Heart Disease Brother      Social History     Social History   • Marital  status:      Spouse name: N/A   • Number of children: N/A   • Years of education: N/A     Occupational History   • Not on file.     Social History Main Topics   • Smoking status: Former Smoker     Packs/day: 0.50     Years: 60.00     Types: Cigarettes     Quit date: 2/20/2013   • Smokeless tobacco: Never Used   • Alcohol use No   • Drug use: No   • Sexual activity: Not on file     Other Topics Concern   • Not on file     Social History Narrative   • No narrative on file     Allergies   Allergen Reactions   • Codeine      Outpatient Encounter Prescriptions as of 8/8/2018   Medication Sig Dispense Refill   • simvastatin (ZOCOR) 20 MG Tab Take 20 mg by mouth every evening.     • nitroglycerin (NITROSTAT) 0.4 MG SL Tab Place 1 Tab under tongue as needed for Chest Pain. Not to exceed 3 tablets in 15 minutes 25 Tab 1   • [START ON 9/9/2018] ALPRAZolam (XANAX) 0.5 MG Tab Take 1/2 tab in the AM and 1 tab in the PM 45 Tab 0   • lisinopril (PRINIVIL) 20 MG Tab TAKE ONE TABLET BY MOUTH DAILY 90 Tab 0   • Fluticasone Furoate-Vilanterol (BREO ELLIPTA) 200-25 MCG/INH AEROSOL POWDER, BREATH ACTIVATED Inhale 1 Puff by mouth.     • lidocaine (XYLOCAINE) 5 % Ointment Apply  to affected area(s) as needed.     • aspirin EC (ECOTRIN) 81 MG TBEC Take 81 mg by mouth every day. Indications: Heart Attack     • Fluticasone-Salmeterol (ADVAIR DISKUS INH) Inhale  by mouth. As directed      • albuterol (VENTOLIN OR PROVENTIL) 108 (90 BASE) MCG/ACT AERS Inhale 2 Puffs by mouth every 6 hours as needed. ud      • tiotropium (SPIRIVA HANDIHALER) 18 MCG CAPS Inhale 18 mcg by mouth every day.     • [DISCONTINUED] FLUoxetine (PROZAC) 10 MG Cap TAKE ONE CAPSULE BY MOUTH DAILY (Patient not taking: Reported on 8/8/2018) 90 Cap 1   • [DISCONTINUED] lisinopril (PRINIVIL, ZESTRIL) 40 MG tablet Take 1 Tab by mouth every day. (Patient not taking: Reported on 8/8/2018) 30 Tab 3   • [DISCONTINUED] amLODIPine (NORVASC) 5 MG Tab Take 1 Tab by mouth every  "day. (Patient not taking: Reported on 8/8/2018) 30 Tab 3   • [DISCONTINUED] amoxicillin (AMOXIL) 500 MG Cap Take 1 Cap by mouth 3 times a day. (Patient not taking: Reported on 8/8/2018) 30 Cap 0   • [DISCONTINUED] fluconazole (DIFLUCAN) 100 MG Tab Take 1 Tab by mouth every day. (Patient not taking: Reported on 8/8/2018) 3 Tab 0   • [DISCONTINUED] nitroglycerin (NITROSTAT) 0.4 MG SL Tab Place 1 Tab under tongue as needed for Chest Pain. Not to exceed 3 tablets in 15 minutes 25 Tab 1   • [DISCONTINUED] doxycycline (VIBRAMYCIN) 100 MG Tab Take 100 mg by mouth 2 times a day.     • [DISCONTINUED] erythromycin (ERYTHROCIN STEARATE) 250 MG tablet Take 250 mg by mouth 3 times a day.       No facility-administered encounter medications on file as of 8/8/2018.      Review of Systems   Constitutional: Negative for chills and fever.   HENT: Negative for congestion.    Respiratory: Positive for shortness of breath. Negative for cough.         Related to COPD.   Cardiovascular: Negative for chest pain, palpitations, orthopnea, leg swelling and PND.   Gastrointestinal: Negative for abdominal pain and nausea.   Musculoskeletal: Negative for myalgias.   Skin: Negative for rash.   Neurological: Negative for dizziness, loss of consciousness and headaches.   Endo/Heme/Allergies: Does not bruise/bleed easily.        Objective:   /60   Pulse 82   Ht 1.575 m (5' 2\")   Wt 46.3 kg (102 lb)   SpO2 92%   BMI 18.66 kg/m²      Physical Exam   Constitutional: She is oriented to person, place, and time. She appears well-developed and well-nourished.   Petite, thin (BMI 18.66)  Using oxygen via NC.   HENT:   Head: Normocephalic.   Eyes: EOM are normal.   Neck: Normal range of motion. Neck supple. No JVD present.   Cardiovascular: Normal rate, regular rhythm and normal heart sounds.    Pulmonary/Chest: Effort normal. No respiratory distress. She has decreased breath sounds in the right lower field and the left lower field. She has no " wheezes. She has rhonchi in the right lower field and the left lower field. She has no rales.   Abdominal: Soft. Bowel sounds are normal. She exhibits no distension. There is no tenderness.   Musculoskeletal: Normal range of motion. She exhibits no edema.   Neurological: She is alert and oriented to person, place, and time.   Skin: Skin is warm and dry. No rash noted.   Psychiatric: She has a normal mood and affect.     Lab Results   Component Value Date/Time    CHOLSTRLTOT 161 02/16/2018 10:07 AM    LDL 74 02/16/2018 10:07 AM    HDL 72 02/16/2018 10:07 AM    TRIGLYCERIDE 74 02/16/2018 10:07 AM       Lab Results   Component Value Date/Time    SODIUM 143 02/16/2018 10:07 AM    POTASSIUM 4.5 02/16/2018 10:07 AM    CHLORIDE 102 02/16/2018 10:07 AM    CO2 26 02/16/2018 10:07 AM    GLUCOSE 98 02/16/2018 10:07 AM    BUN 11 02/16/2018 10:07 AM    CREATININE 0.95 02/16/2018 10:07 AM    BUNCREATRAT 12 02/16/2018 10:07 AM     Lab Results   Component Value Date/Time    ALKPHOSPHAT 87 02/16/2018 10:07 AM    ASTSGOT 18 02/16/2018 10:07 AM    ALTSGPT 7 02/16/2018 10:07 AM    TBILIRUBIN 0.7 02/16/2018 10:07 AM      Results of nuclear stress test: 3/13/16  Normal nuclear stress test. No evidence of infarction or ischemia. Normal systolic function and EF of 70%. Calculated TID 0.98.    Assessment:     1. Coronary artery disease involving native coronary artery of native heart without angina pectoris  simvastatin (ZOCOR) 20 MG Tab    nitroglycerin (NITROSTAT) 0.4 MG SL Tab   2. Essential hypertension     3. Mixed hyperlipidemia     4. Chronic obstructive pulmonary disease, unspecified COPD type (HCC)     5. Chronic respiratory failure with hypoxia (HCC)     6. Anxiety         Medical Decision Making:  Today's Assessment / Status / Plan:     1. CAD, status post remote PCI/BMS x 2 to the LCx in 2008, stable. She has very rare, nonsustained chest pain (about once every 1-2 months), relieved with 1 NTG. This is renewed for her. MPI in  2016 was normal.    2. Hypertension, treated and stable.    3. Hyperlipidemia, treated. Recent lipid panel was good.    4. COPD, on chronic oxygen supplementation, followed by pulmonology.    5. Anxiety, treated with PRN Xanax.    Same medications for now. FU in 1 year, sooner if clinical condition changes.    Collaborating MD: ALPA Russell      No Recipients

## 2018-08-08 NOTE — PROGRESS NOTES
Chief Complaint   Patient presents with   • Follow-Up   • Coronary Artery Disease   • HTN (Controlled)   • Hyperlipidemia       Subjective:   Pat Sevilla is a 85 y.o. female who presents today for 8 month follow-up of CAD, hypertension and hyperlipidemia.    Pat is a 85 year old female with history of CAD, status post remote bare metal stents x 2 to the LCx in 2008, hypertension, hyperlipidemia, and COPD, previously followed by Dr. Parks.    Since the last visit in December 2017, she has been doing well. She has had a few low respiratory infections, treated with antibiotics; breathing is currently stable with all the smoke. She does use oxygen around the clock. Very rare angina, maybe once a month she takes 1 NTG with relief. No palpitations or fluttering of her heart. No orthopnea or PND; no dizziness or syncope; no edema. She did recently trip while [place] a casino (no syncope), and had to have a few stitches in her forehead; it has healed nicely.    Past Medical History:   Diagnosis Date   • Anemia 9/15/2014    normocytic   • CAD (coronary artery disease) 07/2008     PCI/BMS x 2 to the LCx, mid (MiniVision 2.25 x 12mm) and proximal (MiniVision 2.0 x 12mm). March 2016: MPI with no ischemia or infarct, LVEF 70%.   • Cerebrovascular disease    • COPD (chronic obstructive pulmonary disease) ([facility])     Followed by pulmonology   • HTN (hypertension)    • Hyperlipidemia    • Syncope      Past Surgical History:   Procedure Laterality Date   • CARDIAC CATH, LEFT HEART       Family History   Problem Relation Age of Onset   • Heart Disease Mother    • Heart Disease Father    • Heart Disease Brother      Social History     Social History   • Marital status:      Spouse name: N/A   • Number of children: N/A   • Years of education: N/A     Occupational History   • Not on file.     Social History Main Topics   • Smoking status: Former Smoker     Packs/day: 0.50     Years: 60.00     Types: Cigarettes     Quit date:  2/20/2013   • Smokeless tobacco: Never Used   • Alcohol use No   • Drug use: No   • Sexual activity: Not on file     Other Topics Concern   • Not on file     Social History Narrative   • No narrative on file     Allergies   Allergen Reactions   • Codeine      Outpatient Encounter Prescriptions as of 8/8/2018   Medication Sig Dispense Refill   • simvastatin (ZOCOR) 20 MG Tab Take 20 mg by mouth every evening.     • nitroglycerin (NITROSTAT) 0.4 MG SL Tab Place 1 Tab under tongue as needed for Chest Pain. Not to exceed 3 tablets in 15 minutes 25 Tab 1   • [START ON 9/9/2018] ALPRAZolam (XANAX) 0.5 MG Tab Take 1/2 tab in the AM and 1 tab in the PM 45 Tab 0   • lisinopril (PRINIVIL) 20 MG Tab TAKE ONE TABLET BY MOUTH DAILY 90 Tab 0   • Fluticasone Furoate-Vilanterol (BREO ELLIPTA) 200-25 MCG/INH AEROSOL POWDER, BREATH ACTIVATED Inhale 1 Puff by mouth.     • lidocaine (XYLOCAINE) 5 % Ointment Apply  to affected area(s) as needed.     • aspirin EC (ECOTRIN) 81 MG TBEC Take 81 mg by mouth every day. Indications: Heart Attack     • Fluticasone-Salmeterol (ADVAIR DISKUS INH) Inhale  by mouth. As directed      • albuterol (VENTOLIN OR PROVENTIL) 108 (90 BASE) MCG/ACT AERS Inhale 2 Puffs by mouth every 6 hours as needed. ud      • tiotropium (SPIRIVA HANDIHALER) 18 MCG CAPS Inhale 18 mcg by mouth every day.     • [DISCONTINUED] FLUoxetine (PROZAC) 10 MG Cap TAKE ONE CAPSULE BY MOUTH DAILY (Patient not taking: Reported on 8/8/2018) 90 Cap 1   • [DISCONTINUED] lisinopril (PRINIVIL, ZESTRIL) 40 MG tablet Take 1 Tab by mouth every day. (Patient not taking: Reported on 8/8/2018) 30 Tab 3   • [DISCONTINUED] amLODIPine (NORVASC) 5 MG Tab Take 1 Tab by mouth every day. (Patient not taking: Reported on 8/8/2018) 30 Tab 3   • [DISCONTINUED] amoxicillin (AMOXIL) 500 MG Cap Take 1 Cap by mouth 3 times a day. (Patient not taking: Reported on 8/8/2018) 30 Cap 0   • [DISCONTINUED] fluconazole (DIFLUCAN) 100 MG Tab Take 1 Tab by mouth every  "day. (Patient not taking: Reported on 8/8/2018) 3 Tab 0   • [DISCONTINUED] nitroglycerin (NITROSTAT) 0.4 MG SL Tab Place 1 Tab under tongue as needed for Chest Pain. Not to exceed 3 tablets in 15 minutes 25 Tab 1   • [DISCONTINUED] doxycycline (VIBRAMYCIN) 100 MG Tab Take 100 mg by mouth 2 times a day.     • [DISCONTINUED] erythromycin (ERYTHROCIN STEARATE) 250 MG tablet Take 250 mg by mouth 3 times a day.       No facility-administered encounter medications on file as of 8/8/2018.      Review of Systems   Constitutional: Negative for chills and fever.   HENT: Negative for congestion.    Respiratory: Positive for shortness of breath. Negative for cough.         Related to COPD.   Cardiovascular: Negative for chest pain, palpitations, orthopnea, leg swelling and PND.   Gastrointestinal: Negative for abdominal pain and nausea.   Musculoskeletal: Negative for myalgias.   Skin: Negative for rash.   Neurological: Negative for dizziness, loss of consciousness and headaches.   Endo/Heme/Allergies: Does not bruise/bleed easily.        Objective:   /60   Pulse 82   Ht 1.575 m (5' 2\")   Wt 46.3 kg (102 lb)   SpO2 92%   BMI 18.66 kg/m²     Physical Exam   Constitutional: She is oriented to person, place, and time. She appears well-developed and well-nourished.   Petite, thin (BMI 18.66)  Using oxygen via NC.   HENT:   Head: Normocephalic.   Eyes: EOM are normal.   Neck: Normal range of motion. Neck supple. No JVD present.   Cardiovascular: Normal rate, regular rhythm and normal heart sounds.    Pulmonary/Chest: Effort normal. No respiratory distress. She has decreased breath sounds in the right lower field and the left lower field. She has no wheezes. She has rhonchi in the right lower field and the left lower field. She has no rales.   Abdominal: Soft. Bowel sounds are normal. She exhibits no distension. There is no tenderness.   Musculoskeletal: Normal range of motion. She exhibits no edema.   Neurological: She is " alert and oriented to person, place, and time.   Skin: Skin is warm and dry. No rash noted.   Psychiatric: She has a normal mood and affect.     Lab Results   Component Value Date/Time    CHOLSTRLTOT 161 02/16/2018 10:07 AM    LDL 74 02/16/2018 10:07 AM    HDL 72 02/16/2018 10:07 AM    TRIGLYCERIDE 74 02/16/2018 10:07 AM       Lab Results   Component Value Date/Time    SODIUM 143 02/16/2018 10:07 AM    POTASSIUM 4.5 02/16/2018 10:07 AM    CHLORIDE 102 02/16/2018 10:07 AM    CO2 26 02/16/2018 10:07 AM    GLUCOSE 98 02/16/2018 10:07 AM    BUN 11 02/16/2018 10:07 AM    CREATININE 0.95 02/16/2018 10:07 AM    BUNCREATRAT 12 02/16/2018 10:07 AM     Lab Results   Component Value Date/Time    ALKPHOSPHAT 87 02/16/2018 10:07 AM    ASTSGOT 18 02/16/2018 10:07 AM    ALTSGPT 7 02/16/2018 10:07 AM    TBILIRUBIN 0.7 02/16/2018 10:07 AM      Results of nuclear stress test: 3/13/16  Normal nuclear stress test. No evidence of infarction or ischemia. Normal systolic function and EF of 70%. Calculated TID 0.98.    Assessment:     1. Coronary artery disease involving native coronary artery of native heart without angina pectoris  simvastatin (ZOCOR) 20 MG Tab    nitroglycerin (NITROSTAT) 0.4 MG SL Tab   2. Essential hypertension     3. Mixed hyperlipidemia     4. Chronic obstructive pulmonary disease, unspecified COPD type (HCC)     5. Chronic respiratory failure with hypoxia (HCC)     6. Anxiety         Medical Decision Making:  Today's Assessment / Status / Plan:     1. CAD, status post remote PCI/BMS x 2 to the LCx in 2008, stable. She has very rare, nonsustained chest pain (about once every 1-2 months), relieved with 1 NTG. This is renewed for her. MPI in 2016 was normal.    2. Hypertension, treated and stable.    3. Hyperlipidemia, treated. Recent lipid panel was good.    4. COPD, on chronic oxygen supplementation, followed by pulmonology.    5. Anxiety, treated with PRN Xanax.    Same medications for now. FU in 1 year, sooner if  clinical condition changes.    Collaborating MD: ALPA Russell

## 2018-11-15 ENCOUNTER — OFFICE VISIT (OUTPATIENT)
Dept: MEDICAL GROUP | Facility: PHYSICIAN GROUP | Age: 83
End: 2018-11-15
Payer: MEDICARE

## 2018-11-15 VITALS
HEIGHT: 62 IN | RESPIRATION RATE: 12 BRPM | SYSTOLIC BLOOD PRESSURE: 110 MMHG | DIASTOLIC BLOOD PRESSURE: 60 MMHG | WEIGHT: 105 LBS | BODY MASS INDEX: 19.32 KG/M2 | TEMPERATURE: 98.4 F | HEART RATE: 70 BPM | OXYGEN SATURATION: 92 %

## 2018-11-15 DIAGNOSIS — F41.9 ANXIETY: ICD-10-CM

## 2018-11-15 DIAGNOSIS — Z23 NEED FOR INFLUENZA VACCINATION: ICD-10-CM

## 2018-11-15 PROCEDURE — G0008 ADMIN INFLUENZA VIRUS VAC: HCPCS | Performed by: FAMILY MEDICINE

## 2018-11-15 PROCEDURE — 99214 OFFICE O/P EST MOD 30 MIN: CPT | Mod: 25 | Performed by: FAMILY MEDICINE

## 2018-11-15 PROCEDURE — 90662 IIV NO PRSV INCREASED AG IM: CPT | Performed by: FAMILY MEDICINE

## 2018-11-15 RX ORDER — ALPRAZOLAM 0.5 MG/1
TABLET ORAL
Qty: 45 TAB | Refills: 0 | Status: SHIPPED | OUTPATIENT
Start: 2019-01-15 | End: 2019-02-15

## 2018-11-15 RX ORDER — ALPRAZOLAM 0.5 MG/1
TABLET ORAL
Qty: 45 TAB | Refills: 0 | Status: SHIPPED | OUTPATIENT
Start: 2018-11-15 | End: 2018-11-15 | Stop reason: SDUPTHER

## 2018-11-15 RX ORDER — ALPRAZOLAM 0.5 MG/1
TABLET ORAL
Qty: 45 TAB | Refills: 0 | Status: SHIPPED | OUTPATIENT
Start: 2018-12-15 | End: 2018-11-15 | Stop reason: SDUPTHER

## 2018-11-15 ASSESSMENT — PATIENT HEALTH QUESTIONNAIRE - PHQ9: CLINICAL INTERPRETATION OF PHQ2 SCORE: 0

## 2018-11-15 NOTE — PROGRESS NOTES
Chief Complaint   Patient presents with   • Anxiety       HISTORY OF PRESENT ILLNESS: Patient is a 86 y.o. female established patient who presents today for a med refill of a controlled substance in addition to their other issues listed in the rest of the progress note    The patient is requesting medication for the following issue:anxiety  Approximate date of onset of condition was years ago.    Current Problems:    Anxiety disorder      Patient Active Problem List    Diagnosis Date Noted   • CAD (coronary artery disease)      Priority: High   • COPD (chronic obstructive pulmonary disease) ([facility])      Priority: High   • Hyperlipemia      Priority: High   • HTN (hypertension)      Priority: High   • Hyperlipidemia      Priority: High   • Bilateral carotid artery disease ([facility]) 12/06/2017   • CRI (chronic renal insufficiency) 09/12/2017   • Chronic respiratory failure with hypoxia ([facility]) 08/09/2017       Allergies:Codeine    Current Outpatient Prescriptions   Medication Sig Dispense Refill   • [START ON 1/15/2019] ALPRAZolam (XANAX) 0.5 MG Tab Take 1/2 tab in the AM and 1 tab in the PM 45 Tab 0   • simvastatin (ZOCOR) 20 MG Tab Take 20 mg by mouth every evening.     • nitroglycerin (NITROSTAT) 0.4 MG SL Tab Place 1 Tab under tongue as needed for Chest Pain. Not to exceed 3 tablets in 15 minutes 25 Tab 1   • lisinopril (PRINIVIL) 20 MG Tab TAKE ONE TABLET BY MOUTH DAILY 90 Tab 0   • Fluticasone Furoate-Vilanterol (BREO ELLIPTA) 200-25 MCG/INH AEROSOL POWDER, BREATH ACTIVATED Inhale 1 Puff by mouth.     • lidocaine (XYLOCAINE) 5 % Ointment Apply  to affected area(s) as needed.     • aspirin EC (ECOTRIN) 81 MG TBEC Take 81 mg by mouth every day. Indications: Heart Attack     • Fluticasone-Salmeterol (ADVAIR DISKUS INH) Inhale  by mouth. As directed      • albuterol (VENTOLIN OR PROVENTIL) 108 (90 BASE) MCG/ACT AERS Inhale 2 Puffs by mouth every 6 hours as needed. ud      • tiotropium (SPIRIVA HANDIHALER) 18 MCG CAPS Inhale  "18 mcg by mouth every day.       No current facility-administered medications for this visit.          I have discussed with the patient that with any controlled substance prescription it is vital to have these medications in a safe, secure environment. I have discussed that it is their responsibility that their medications are not accessible to anyone else who may use them inadvertently.    I have discussed with the patient that any controlled substance can impair the ability to drive or operate any machinery and that the patient should not use them if they plan on driving or operating machinery.    I have reviewed and updated the past medical/surgical, family history and social history as of today.    ROS:  Review of Systems   Constitutional: Negative for fever, chills, weight loss and malaise/fatigue.   Respiratory: Negative for cough, sputum production, shortness of breath and wheezing.    Cardiovascular: Negative for chest pain, palpitations, orthopnea and leg swelling.   Gastrointestinal: Negative for heartburn, nausea, vomiting, constipation and abdominal pain.  Musculoskeletal:  neg  Skin: Negative for rash and itching.   Neurological: neg  Psychiatric/Behavioral: Negative for signs or symptoms of depression,, suicidal or homicidal ideation.  Patient able to contract for their safety.  + anxiety  All other systems reviewed and are negative except as in HPI.      Exam:  Blood pressure 110/60, pulse 70, temperature 36.9 °C (98.4 °F), resp. rate 12, height 1.575 m (5' 2\"), weight 47.6 kg (105 lb), SpO2 92 %.  General:  female patient who appears in no distress        DISCUSSION OF CARE PLAN:    - I discussed management options. I reviewed symptomatic care     - I will obtain/review additional records if needed    - I discussed efforts with home exercise program and activity modification     - I reviewed medication monitoring.       The patient was advised to avoid alcohol use with prescribed medication. I " counseled the patient regarding risks, side effects, and interactions of medications. I counseled the patient on the controlled substance prescribing program. I reviewed further symptomatic medications.  - I reviewed additional diagnostic options: Yes.  - I reviewed additional therapeutic options: Yes  - I reviewed psychosocial interventions:  Yes      Assessment/Plan:  1. Anxiety  ALPRAZolam (XANAX) 0.5 MG Tab    DISCONTINUED: ALPRAZolam (XANAX) 0.5 MG Tab    DISCONTINUED: ALPRAZolam (XANAX) 0.5 MG Tab   2. Need for influenza vaccination  INFLUENZA VACCINE, HIGH DOSE (65+ ONLY)       A Controlled Substance Agreement has been signed within the last year. A urine drug screen has been done in the past year.      The patient reports that their insomnia is well controlled with the current treatment plan  They were counseled on other means to help with sleep   This patient is continuing to use a controlled substance (CS) on a long term basis.  The patient is thoroughly aware of the goals of treatment with the CS  The patient is aware that yearly and random urine drug screens are required.  The patient has been instructed to take the CS only as prescribed.  The patient is prohibited from sharing the CS with any other person.  The patient is instructed to inform the provider if any other CS is taken, of any alcohol or cannabis or other recreational drug use, any treatment for side effects of the CS or complications, if they have CS active rx in other states  The patient has evidence for a reason for the CS  The treatment plan has been discussed with the patient  The  report has been reviewed

## 2018-11-26 DIAGNOSIS — I65.23 BILATERAL CAROTID ARTERY STENOSIS: ICD-10-CM

## 2019-02-18 DIAGNOSIS — E78.5 HYPERLIPIDEMIA, UNSPECIFIED HYPERLIPIDEMIA TYPE: ICD-10-CM

## 2019-02-19 NOTE — TELEPHONE ENCOUNTER
Was the patient seen in the last year in this department? Yes    Does patient have an active prescription for medications requested? Yes    Received Request Via: Pharmacy     Last visit:11/15/18  Last lab:6/20/18

## 2019-02-20 RX ORDER — SIMVASTATIN 20 MG
TABLET ORAL
Qty: 90 TAB | Refills: 2 | Status: ON HOLD | OUTPATIENT
Start: 2019-02-20 | End: 2019-07-10

## 2019-03-04 ENCOUNTER — OFFICE VISIT (OUTPATIENT)
Dept: MEDICAL GROUP | Facility: PHYSICIAN GROUP | Age: 84
End: 2019-03-04
Payer: MEDICARE

## 2019-03-04 VITALS
OXYGEN SATURATION: 84 % | RESPIRATION RATE: 12 BRPM | TEMPERATURE: 98.3 F | BODY MASS INDEX: 19.32 KG/M2 | SYSTOLIC BLOOD PRESSURE: 130 MMHG | HEIGHT: 62 IN | WEIGHT: 105 LBS | DIASTOLIC BLOOD PRESSURE: 60 MMHG | HEART RATE: 85 BPM

## 2019-03-04 DIAGNOSIS — F41.9 ANXIETY: ICD-10-CM

## 2019-03-04 PROCEDURE — 99214 OFFICE O/P EST MOD 30 MIN: CPT | Performed by: FAMILY MEDICINE

## 2019-03-04 RX ORDER — ALPRAZOLAM 0.5 MG/1
0.5 TABLET ORAL NIGHTLY PRN
Qty: 45 TAB | Refills: 0 | Status: SHIPPED | OUTPATIENT
Start: 2019-03-04 | End: 2019-03-04 | Stop reason: SDUPTHER

## 2019-03-04 RX ORDER — ALPRAZOLAM 0.5 MG/1
0.5 TABLET ORAL NIGHTLY PRN
Qty: 45 TAB | Refills: 0 | Status: SHIPPED | OUTPATIENT
Start: 2019-04-04 | End: 2019-03-04 | Stop reason: SDUPTHER

## 2019-03-04 RX ORDER — ALPRAZOLAM 0.5 MG/1
0.5 TABLET ORAL NIGHTLY PRN
COMMUNITY
End: 2019-03-04 | Stop reason: SDUPTHER

## 2019-03-04 RX ORDER — ALPRAZOLAM 0.5 MG/1
0.5 TABLET ORAL NIGHTLY PRN
Qty: 45 TAB | Refills: 0 | Status: SHIPPED | OUTPATIENT
Start: 2019-05-04 | End: 2019-06-03

## 2019-03-04 ASSESSMENT — PATIENT HEALTH QUESTIONNAIRE - PHQ9: CLINICAL INTERPRETATION OF PHQ2 SCORE: 0

## 2019-03-05 NOTE — PROGRESS NOTES
Chief Complaint   Patient presents with   • Anxiety       HISTORY OF PRESENT ILLNESS: Patient is a 86 y.o. female established patient who presents today for a med refill of a controlled substance in addition to their other issues listed in the rest of the progress note    The patient is requesting medication for the following issue:anxiety  Approximate date of onset of condition was years ago.    Current Problems:    Anxiety disorder      Patient Active Problem List    Diagnosis Date Noted   • CAD (coronary artery disease)      Priority: High   • COPD (chronic obstructive pulmonary disease) ([facility])      Priority: High   • Hyperlipemia      Priority: High   • HTN (hypertension)      Priority: High   • Hyperlipidemia      Priority: High   • Bilateral carotid artery disease ([facility]) 12/06/2017   • CRI (chronic renal insufficiency) 09/12/2017   • Chronic respiratory failure with hypoxia ([facility]) 08/09/2017       Allergies:Codeine    Current Outpatient Prescriptions   Medication Sig Dispense Refill   • [START ON 5/4/2019] ALPRAZolam (XANAX) 0.5 MG Tab Take 1 Tab by mouth at bedtime as needed for Anxiety for up to 30 days. 1/2 in the morning and 1 at night 45 Tab 0   • simvastatin (ZOCOR) 20 MG Tab TAKE ONE TABLET BY MOUTH EVERY EVENING 90 Tab 2   • simvastatin (ZOCOR) 20 MG Tab Take 20 mg by mouth every evening.     • nitroglycerin (NITROSTAT) 0.4 MG SL Tab Place 1 Tab under tongue as needed for Chest Pain. Not to exceed 3 tablets in 15 minutes 25 Tab 1   • lisinopril (PRINIVIL) 20 MG Tab TAKE ONE TABLET BY MOUTH DAILY 90 Tab 0   • Fluticasone Furoate-Vilanterol (BREO ELLIPTA) 200-25 MCG/INH AEROSOL POWDER, BREATH ACTIVATED Inhale 1 Puff by mouth.     • lidocaine (XYLOCAINE) 5 % Ointment Apply  to affected area(s) as needed.     • aspirin EC (ECOTRIN) 81 MG TBEC Take 81 mg by mouth every day. Indications: Heart Attack     • Fluticasone-Salmeterol (ADVAIR DISKUS INH) Inhale  by mouth. As directed      • albuterol (VENTOLIN OR  "PROVENTIL) 108 (90 BASE) MCG/ACT AERS Inhale 2 Puffs by mouth every 6 hours as needed. ud      • tiotropium (SPIRIVA HANDIHALER) 18 MCG CAPS Inhale 18 mcg by mouth every day.       No current facility-administered medications for this visit.          I have discussed with the patient that with any controlled substance prescription it is vital to have these medications in a safe, secure environment. I have discussed that it is their responsibility that their medications are not accessible to anyone else who may use them inadvertently.    I have discussed with the patient that any controlled substance can impair the ability to drive or operate any machinery and that the patient should not use them if they plan on driving or operating machinery.    I have reviewed and updated the past medical/surgical, family history and social history as of today.    ROS:  Review of Systems   Constitutional: Negative for fever, chills, weight loss and malaise/fatigue.   Respiratory: Negative for cough, sputum production, shortness of breath and wheezing.    Cardiovascular: Negative for chest pain, palpitations, orthopnea and leg swelling.   Gastrointestinal: Negative for heartburn, nausea, vomiting, constipation and abdominal pain.  Musculoskeletal:  neg  Skin: Negative for rash and itching.   Neurological: neg  Psychiatric/Behavioral: Negative for signs or symptoms of depression,, suicidal or homicidal ideation.  Patient able to contract for their safety.  + anxiety  All other systems reviewed and are negative except as in HPI.      Exam:  Blood pressure 130/60, pulse 85, temperature 36.8 °C (98.3 °F), resp. rate 12, height 1.575 m (5' 2\"), weight 47.6 kg (105 lb), SpO2 (!) 84 %.  General:  female patient who appears in no distress        DISCUSSION OF CARE PLAN:    - I discussed management options. I reviewed symptomatic care     - I will obtain/review additional records if needed    - I discussed efforts with home exercise program " and activity modification     - I reviewed medication monitoring.       The patient was advised to avoid alcohol use with prescribed medication. I counseled the patient regarding risks, side effects, and interactions of medications. I counseled the patient on the controlled substance prescribing program. I reviewed further symptomatic medications.  - I reviewed additional diagnostic options: Yes.  - I reviewed additional therapeutic options: Yes  - I reviewed psychosocial interventions:  Yes      Assessment/Plan:  1. Anxiety  ALPRAZolam (XANAX) 0.5 MG Tab    DISCONTINUED: ALPRAZolam (XANAX) 0.5 MG Tab    DISCONTINUED: ALPRAZolam (XANAX) 0.5 MG Tab       A Controlled Substance Agreement has been signed within the last year. A urine drug screen has been done in the past year.      The patient reports that their insomnia is well controlled with the current treatment plan  They were counseled on other means to help with sleep   This patient is continuing to use a controlled substance (CS) on a long term basis.  The patient is thoroughly aware of the goals of treatment with the CS  The patient is aware that yearly and random urine drug screens are required.  The patient has been instructed to take the CS only as prescribed.  The patient is prohibited from sharing the CS with any other person.  The patient is instructed to inform the provider if any other CS is taken, of any alcohol or cannabis or other recreational drug use, any treatment for side effects of the CS or complications, if they have CS active rx in other states  The patient has evidence for a reason for the CS  The treatment plan has been discussed with the patient  The  report has been reviewed

## 2019-03-06 ENCOUNTER — OFFICE VISIT (OUTPATIENT)
Dept: CARDIOLOGY | Facility: PHYSICIAN GROUP | Age: 84
End: 2019-03-06
Payer: MEDICARE

## 2019-03-06 VITALS
OXYGEN SATURATION: 91 % | HEIGHT: 62 IN | BODY MASS INDEX: 19.14 KG/M2 | SYSTOLIC BLOOD PRESSURE: 122 MMHG | HEART RATE: 88 BPM | WEIGHT: 104 LBS | DIASTOLIC BLOOD PRESSURE: 60 MMHG

## 2019-03-06 DIAGNOSIS — I25.10 CORONARY ARTERY DISEASE INVOLVING NATIVE CORONARY ARTERY OF NATIVE HEART WITHOUT ANGINA PECTORIS: ICD-10-CM

## 2019-03-06 DIAGNOSIS — J96.11 CHRONIC RESPIRATORY FAILURE WITH HYPOXIA (HCC): ICD-10-CM

## 2019-03-06 DIAGNOSIS — I10 ESSENTIAL HYPERTENSION: ICD-10-CM

## 2019-03-06 DIAGNOSIS — J44.9 CHRONIC OBSTRUCTIVE PULMONARY DISEASE, UNSPECIFIED COPD TYPE (HCC): ICD-10-CM

## 2019-03-06 DIAGNOSIS — I65.23 BILATERAL CAROTID ARTERY STENOSIS: ICD-10-CM

## 2019-03-06 DIAGNOSIS — E78.2 MIXED HYPERLIPIDEMIA: ICD-10-CM

## 2019-03-06 PROCEDURE — 99214 OFFICE O/P EST MOD 30 MIN: CPT | Performed by: NURSE PRACTITIONER

## 2019-03-06 RX ORDER — SULFAMETHOXAZOLE AND TRIMETHOPRIM 800; 160 MG/1; MG/1
TABLET ORAL
COMMUNITY
Start: 2019-01-29 | End: 2019-03-06

## 2019-03-06 RX ORDER — PREDNISONE 10 MG/1
TABLET ORAL
COMMUNITY
Start: 2019-01-31 | End: 2019-03-06

## 2019-03-06 RX ORDER — AZITHROMYCIN 250 MG/1
TABLET, FILM COATED ORAL
Status: ON HOLD | COMMUNITY
Start: 2019-02-18 | End: 2019-07-10

## 2019-03-06 RX ORDER — DOXYCYCLINE 100 MG/1
TABLET ORAL
COMMUNITY
Start: 2019-02-03 | End: 2019-03-06

## 2019-03-06 ASSESSMENT — ENCOUNTER SYMPTOMS
NAUSEA: 0
MYALGIAS: 0
CHILLS: 0
PND: 0
ORTHOPNEA: 0
BRUISES/BLEEDS EASILY: 0
INSOMNIA: 1
PALPITATIONS: 0
HEADACHES: 0
SHORTNESS OF BREATH: 1
COUGH: 0
FEVER: 0
LOSS OF CONSCIOUSNESS: 0
ABDOMINAL PAIN: 0
DIZZINESS: 0

## 2019-03-06 NOTE — PROGRESS NOTES
Chief Complaint   Patient presents with   • Follow-Up   • Coronary Artery Disease   • HTN (Controlled)   • Hyperlipidemia   • COPD   • Carotid Artery Stenosis       Subjective:   Pat Sevilla is a 86 y.o. female who presents today for six month follow-up of CAD, hypertension, hyperlipidemia, and carotid stenosis.    Pat is a 86 year old female with history of CAD, status post remote bare metal stents x 2 to the LCx in 2008, hypertension, hyperlipidemia, and COPD, previously followed by Dr. Parks, and last seen in August 2018 by me.    Since then, she has been doing well. She does still use oxygen around the clock; breathing is stable. She does use Xanax to help her sleep, prescribed by her PCP. No chest pain, pressure, tightness or discomfort. No palpitations or fluttering of her heart. No orthopnea or PND; no dizziness or syncope; no edema.     Past Medical History:   Diagnosis Date   • Anemia 9/15/2014    normocytic   • CAD (coronary artery disease) 07/2008     PCI/BMS x 2 to the LCx, mid (MiniVision 2.25 x 12mm) and proximal (MiniVision 2.0 x 12mm). March 2016: MPI with no ischemia or infarct, LVEF 70%.   • Carotid stenosis, bilateral 12/2016    Carotid ultrasound with <50% stenosis bilaterally.   • Cerebrovascular disease    • COPD (chronic obstructive pulmonary disease) (HCC)     Followed by pulmonology   • HTN (hypertension)    • Hyperlipidemia    • Syncope      Past Surgical History:   Procedure Laterality Date   • CARDIAC CATH, LEFT HEART       Family History   Problem Relation Age of Onset   • Heart Disease Mother    • Heart Disease Father    • Heart Disease Brother      Social History     Social History   • Marital status:      Spouse name: N/A   • Number of children: N/A   • Years of education: N/A     Occupational History   • Not on file.     Social History Main Topics   • Smoking status: Former Smoker     Packs/day: 0.50     Years: 60.00     Types: Cigarettes     Quit date: 2/20/2013   •  Smokeless tobacco: Never Used   • Alcohol use No   • Drug use: No   • Sexual activity: Not on file     Other Topics Concern   • Not on file     Social History Narrative   • No narrative on file     Allergies   Allergen Reactions   • Codeine      Outpatient Encounter Prescriptions as of 3/6/2019   Medication Sig Dispense Refill   • azithromycin (ZITHROMAX) 250 MG Tab      • ADVAIR DISKUS 500-50 MCG/DOSE AEROSOL POWDER, BREATH ACTIVATED      • INCRUSE ELLIPTA 62.5 MCG/INH AEROSOL POWDER, BREATH ACTIVATED      • fluticasone-salmeterol (ADVAIR) 500-50 MCG/DOSE AEROSOL POWDER, BREATH ACTIVATED Inhale 1 Puff by mouth every 12 hours.     • [START ON 5/4/2019] ALPRAZolam (XANAX) 0.5 MG Tab Take 1 Tab by mouth at bedtime as needed for Anxiety for up to 30 days. 1/2 in the morning and 1 at night 45 Tab 0   • simvastatin (ZOCOR) 20 MG Tab TAKE ONE TABLET BY MOUTH EVERY EVENING 90 Tab 2   • nitroglycerin (NITROSTAT) 0.4 MG SL Tab Place 1 Tab under tongue as needed for Chest Pain. Not to exceed 3 tablets in 15 minutes 25 Tab 1   • lisinopril (PRINIVIL) 20 MG Tab TAKE ONE TABLET BY MOUTH DAILY 90 Tab 0   • lidocaine (XYLOCAINE) 5 % Ointment Apply  to affected area(s) as needed.     • aspirin EC (ECOTRIN) 81 MG TBEC Take 81 mg by mouth every day. Indications: Heart Attack     • albuterol (VENTOLIN OR PROVENTIL) 108 (90 BASE) MCG/ACT AERS Inhale 2 Puffs by mouth every 6 hours as needed. ud      • [DISCONTINUED] doxycycline monohydrate (ADOXA) 100 MG tablet      • [DISCONTINUED] predniSONE (DELTASONE) 10 MG Tab      • [DISCONTINUED] sulfamethoxazole-trimethoprim (BACTRIM DS) 800-160 MG tablet      • [DISCONTINUED] simvastatin (ZOCOR) 20 MG Tab Take 20 mg by mouth every evening.     • [DISCONTINUED] Fluticasone Furoate-Vilanterol (BREO ELLIPTA) 200-25 MCG/INH AEROSOL POWDER, BREATH ACTIVATED Inhale 1 Puff by mouth.     • [DISCONTINUED] Fluticasone-Salmeterol (ADVAIR DISKUS INH) Inhale  by mouth. As directed      • [DISCONTINUED]  "tiotropium (SPIRIVA HANDIHALER) 18 MCG CAPS Inhale 18 mcg by mouth every day.       No facility-administered encounter medications on file as of 3/6/2019.      Review of Systems   Constitutional: Negative for chills and fever.   HENT: Negative for congestion.    Respiratory: Positive for shortness of breath. Negative for cough.         Related to COPD.   Cardiovascular: Negative for chest pain, palpitations, orthopnea, leg swelling and PND.   Gastrointestinal: Negative for abdominal pain and nausea.   Musculoskeletal: Negative for myalgias.   Skin: Negative for rash.   Neurological: Negative for dizziness, loss of consciousness and headaches.   Endo/Heme/Allergies: Does not bruise/bleed easily.   Psychiatric/Behavioral: The patient has insomnia.         Uses Xanax PRN.        Objective:   /60 (BP Location: Left arm, Patient Position: Sitting)   Pulse 88   Ht 1.575 m (5' 2\")   Wt 47.2 kg (104 lb)   SpO2 91%   BMI 19.02 kg/m²     Physical Exam   Constitutional: She is oriented to person, place, and time. She appears well-developed and well-nourished.   Petite, thin (BMI 19.02)  Using oxygen via NC.   HENT:   Head: Normocephalic.   Eyes: EOM are normal.   Neck: Normal range of motion. Neck supple. No JVD present.   Cardiovascular: Normal rate, regular rhythm and normal heart sounds.    Pulmonary/Chest: Effort normal. No respiratory distress. She has decreased breath sounds in the right lower field and the left lower field. She has no wheezes. She has no rhonchi. She has no rales.   Abdominal: Soft. Bowel sounds are normal. She exhibits no distension. There is no tenderness.   Musculoskeletal: Normal range of motion. She exhibits no edema.   Neurological: She is alert and oriented to person, place, and time.   Skin: Skin is warm and dry. No rash noted.   Psychiatric: She has a normal mood and affect.     Claims she has recent labwork at [facility] in February 2019 - results have been requested.    Lab Results "   Component Value Date/Time    WBC 5.3 06/20/2018 02:33 PM    RBC 3.85 06/20/2018 02:33 PM    HEMOGLOBIN 10.5 (L) 06/20/2018 02:33 PM    HEMATOCRIT 31.5 (L) 06/20/2018 02:33 PM    MCV 82 06/20/2018 02:33 PM    MCH 27.3 06/20/2018 02:33 PM    MCHC 33.3 06/20/2018 02:33 PM        Assessment:     1. Coronary artery disease involving native coronary artery of native heart without angina pectoris     2. Essential hypertension     3. Mixed hyperlipidemia     4. Bilateral carotid artery stenosis  US-CAROTID DOPPLER BILAT   5. Chronic respiratory failure with hypoxia (HCC)     6. Chronic obstructive pulmonary disease, unspecified COPD type (HCC)         Medical Decision Making:  Today's Assessment / Status / Plan:     1. CAD, status post remote stents to the LCx in 2008, stable. She remains on ASA, ACEI and statin.    2. Hypertension, treated with Lisinopril, stable. BP is good today.    3. Hyperlipidemia, treated with statin. Have requested recent labs.    4. Mild bilateral carotid stenosis, to repeat carotid US, as last one was in 2016.    5. COPD/chronic respiratory insufficiency, on oxygen and inhalers, stable.    She remains stable at this time. Same medications for now. Carotid US as above. FU in 6 months.    Collaborating MD: ALPA Russell

## 2019-03-06 NOTE — LETTER
[facility]   [address]  [place] [address]  Phone: [phone]  Fax: [phone]              Pat Sevilla  8/31/1932    Encounter Date: 3/6/2019    RAMIN Weeks          PROGRESS NOTE:  No notes on file      No Recipients

## 2019-03-07 DIAGNOSIS — I25.10 CORONARY ARTERY DISEASE INVOLVING NATIVE CORONARY ARTERY OF NATIVE HEART WITHOUT ANGINA PECTORIS: ICD-10-CM

## 2019-03-07 RX ORDER — NITROGLYCERIN 0.4 MG/1
0.4 TABLET SUBLINGUAL PRN
Qty: 25 TAB | Refills: 5 | Status: SHIPPED | OUTPATIENT
Start: 2019-03-07

## 2019-04-22 DIAGNOSIS — I10 ESSENTIAL HYPERTENSION: ICD-10-CM

## 2019-04-22 RX ORDER — LISINOPRIL 40 MG/1
TABLET ORAL
Qty: 30 TAB | Refills: 2 | Status: SHIPPED | OUTPATIENT
Start: 2019-04-22 | End: 2019-06-07 | Stop reason: SDUPTHER

## 2019-04-22 NOTE — TELEPHONE ENCOUNTER
Was the patient seen in the last year in this department? Yes    Does patient have an active prescription for medications requested? Yes    Received Request Via: Pharmacy     Last Visit: 3/4/19  Last Labs:

## 2019-04-22 NOTE — TELEPHONE ENCOUNTER
Was the patient seen in the last year in this department? Yes    Does patient have an active prescription for medications requested? Yes    Received Request Via: Pharmacy     Last Visit:03/06/2019  Last Lab:06/22/2018

## 2019-06-07 DIAGNOSIS — I10 ESSENTIAL HYPERTENSION: ICD-10-CM

## 2019-06-11 RX ORDER — LISINOPRIL 40 MG/1
TABLET ORAL
Qty: 90 TAB | Refills: 1 | Status: SHIPPED | OUTPATIENT
Start: 2019-06-11 | End: 2019-09-04

## 2019-06-25 ENCOUNTER — OFFICE VISIT (OUTPATIENT)
Dept: MEDICAL GROUP | Facility: PHYSICIAN GROUP | Age: 84
End: 2019-06-25
Payer: MEDICARE

## 2019-06-25 ENCOUNTER — TELEPHONE (OUTPATIENT)
Dept: MEDICAL GROUP | Facility: PHYSICIAN GROUP | Age: 84
End: 2019-06-25

## 2019-06-25 VITALS
BODY MASS INDEX: 18.75 KG/M2 | OXYGEN SATURATION: 90 % | SYSTOLIC BLOOD PRESSURE: 152 MMHG | HEIGHT: 62 IN | DIASTOLIC BLOOD PRESSURE: 60 MMHG | TEMPERATURE: 101 F | HEART RATE: 74 BPM | WEIGHT: 101.9 LBS

## 2019-06-25 DIAGNOSIS — F41.9 ANXIETY: ICD-10-CM

## 2019-06-25 DIAGNOSIS — Z23 NEED FOR PNEUMOCOCCAL VACCINATION: ICD-10-CM

## 2019-06-25 DIAGNOSIS — Z12.11 COLON CANCER SCREENING: ICD-10-CM

## 2019-06-25 PROCEDURE — 99214 OFFICE O/P EST MOD 30 MIN: CPT | Performed by: FAMILY MEDICINE

## 2019-06-25 RX ORDER — ALPRAZOLAM 0.5 MG/1
0.5 TABLET ORAL NIGHTLY PRN
Qty: 45 TAB | Refills: 0 | Status: SHIPPED | OUTPATIENT
Start: 2019-07-25 | End: 2019-06-25 | Stop reason: SDUPTHER

## 2019-06-25 RX ORDER — ALPRAZOLAM 0.5 MG/1
0.5 TABLET ORAL NIGHTLY PRN
Qty: 45 TAB | Refills: 0 | Status: SHIPPED | OUTPATIENT
Start: 2019-08-25 | End: 2019-10-14 | Stop reason: SDUPTHER

## 2019-06-25 RX ORDER — ALPRAZOLAM 0.5 MG/1
0.5 TABLET ORAL NIGHTLY PRN
Qty: 45 TAB | Refills: 0 | Status: SHIPPED | OUTPATIENT
Start: 2019-06-25 | End: 2019-06-25 | Stop reason: SDUPTHER

## 2019-06-25 NOTE — TELEPHONE ENCOUNTER
Patient could not leave a sample of urine during today's visit. She will be returning tomorrow to try again.  Paperwork is at , ua cup labeled in lab.

## 2019-06-25 NOTE — PROGRESS NOTES
Chief Complaint   Patient presents with   • Anxiety     medication refill       HISTORY OF PRESENT ILLNESS: Patient is a 86 y.o. female established patient who presents today for a med refill of a controlled substance in addition to their other issues listed in the rest of the progress note    The patient is requesting medication for the following issue:anxiety  Approximate date of onset of condition was years ago.    Current Problems:    Anxiety disorder      Patient Active Problem List    Diagnosis Date Noted   • CAD (coronary artery disease)      Priority: High   • COPD (chronic obstructive pulmonary disease) ([facility])      Priority: High   • Hyperlipemia      Priority: High   • HTN (hypertension)      Priority: High   • Hyperlipidemia      Priority: High   • Bilateral carotid artery disease ([facility]) 12/06/2017     Priority: Medium   • CRI (chronic renal insufficiency) 09/12/2017     Priority: Medium   • Chronic respiratory failure with hypoxia ([facility]) 08/09/2017     Priority: Medium       Allergies:Codeine    Current Outpatient Prescriptions   Medication Sig Dispense Refill   • [START ON 8/25/2019] ALPRAZolam (XANAX) 0.5 MG Tab Take 1 Tab by mouth at bedtime as needed for Sleep for up to 30 days. 1/2 in the morning and 1 at night 45 Tab 0   • lisinopril (PRINIVIL, ZESTRIL) 40 MG tablet TAKE ONE TABLET BY MOUTH DAILY 90 Tab 1   • nitroglycerin (NITROSTAT) 0.4 MG SL Tab Place 1 Tab under tongue as needed for Chest Pain. Not to exceed 3 tablets in 15 minutes 25 Tab 5   • azithromycin (ZITHROMAX) 250 MG Tab      • ADVAIR DISKUS 500-50 MCG/DOSE AEROSOL POWDER, BREATH ACTIVATED      • INCRUSE ELLIPTA 62.5 MCG/INH AEROSOL POWDER, BREATH ACTIVATED      • fluticasone-salmeterol (ADVAIR) 500-50 MCG/DOSE AEROSOL POWDER, BREATH ACTIVATED Inhale 1 Puff by mouth every 12 hours.     • simvastatin (ZOCOR) 20 MG Tab TAKE ONE TABLET BY MOUTH EVERY EVENING 90 Tab 2   • lisinopril (PRINIVIL) 20 MG Tab TAKE ONE TABLET BY MOUTH DAILY 90 Tab 0  "  • lidocaine (XYLOCAINE) 5 % Ointment Apply  to affected area(s) as needed.     • aspirin EC (ECOTRIN) 81 MG TBEC Take 81 mg by mouth every day. Indications: Heart Attack     • albuterol (VENTOLIN OR PROVENTIL) 108 (90 BASE) MCG/ACT AERS Inhale 2 Puffs by mouth every 6 hours as needed. ud        No current facility-administered medications for this visit.          I have discussed with the patient that with any controlled substance prescription it is vital to have these medications in a safe, secure environment. I have discussed that it is their responsibility that their medications are not accessible to anyone else who may use them inadvertently.    I have discussed with the patient that any controlled substance can impair the ability to drive or operate any machinery and that the patient should not use them if they plan on driving or operating machinery.    I have reviewed and updated the past medical/surgical, family history and social history as of today.    ROS:  Review of Systems   Constitutional: Negative for fever, chills, weight loss and malaise/fatigue.   Respiratory: Negative for cough, sputum production, shortness of breath and wheezing.    Cardiovascular: Negative for chest pain, palpitations, orthopnea and leg swelling.   Gastrointestinal: Negative for heartburn, nausea, vomiting, constipation and abdominal pain.  Musculoskeletal:  neg  Skin: Negative for rash and itching.   Neurological: neg  Psychiatric/Behavioral: Negative for signs or symptoms of depression,, suicidal or homicidal ideation.  Patient able to contract for their safety.  + anxiety  All other systems reviewed and are negative except as in HPI.      Exam:  /60 (BP Location: Right arm, Patient Position: Sitting, BP Cuff Size: Adult)   Pulse 74   Temp (!) 38.3 °C (101 °F) (Temporal)   Ht 1.575 m (5' 2\")   Wt 46.2 kg (101 lb 14.4 oz)   SpO2 90%   General:  female patient who appears in no distress        DISCUSSION OF CARE " PLAN:    - I discussed management options. I reviewed symptomatic care     - I will obtain/review additional records if needed    - I discussed efforts with home exercise program and activity modification     - I reviewed medication monitoring.       The patient was advised to avoid alcohol use with prescribed medication. I counseled the patient regarding risks, side effects, and interactions of medications. I counseled the patient on the controlled substance prescribing program. I reviewed further symptomatic medications.  - I reviewed additional diagnostic options: Yes.  - I reviewed additional therapeutic options: Yes  - I reviewed psychosocial interventions:  Yes      Assessment/Plan:  1. Need for pneumococcal vaccination     2. Anxiety  ALPRAZolam (XANAX) 0.5 MG Tab    DISCONTINUED: ALPRAZolam (XANAX) 0.5 MG Tab    DISCONTINUED: ALPRAZolam (XANAX) 0.5 MG Tab       A Controlled Substance Agreement has been signed within the last year. A urine drug screen has been done in the past year.      The patient reports that their insomnia is well controlled with the current treatment plan  They were counseled on other means to help with sleep   This patient is continuing to use a controlled substance (CS) on a long term basis.  The patient is thoroughly aware of the goals of treatment with the CS  The patient is aware that yearly and random urine drug screens are required.  The patient has been instructed to take the CS only as prescribed.  The patient is prohibited from sharing the CS with any other person.  The patient is instructed to inform the provider if any other CS is taken, of any alcohol or cannabis or other recreational drug use, any treatment for side effects of the CS or complications, if they have CS active rx in other states  The patient has evidence for a reason for the CS  The treatment plan has been discussed with the patient  The  report has been reviewed

## 2019-06-28 DIAGNOSIS — I65.23 BILATERAL CAROTID ARTERY STENOSIS: ICD-10-CM

## 2019-07-05 ENCOUNTER — HOSPITAL ENCOUNTER (OUTPATIENT)
Facility: MEDICAL CENTER | Age: 84
DRG: 983 | End: 2019-07-05
Payer: MEDICARE

## 2019-07-06 ENCOUNTER — HOSPITAL ENCOUNTER (OUTPATIENT)
Dept: RADIOLOGY | Facility: MEDICAL CENTER | Age: 84
End: 2019-07-06

## 2019-07-06 ENCOUNTER — APPOINTMENT (OUTPATIENT)
Dept: RADIOLOGY | Facility: MEDICAL CENTER | Age: 84
DRG: 983 | End: 2019-07-06
Attending: PSYCHIATRY & NEUROLOGY
Payer: MEDICARE

## 2019-07-06 ENCOUNTER — APPOINTMENT (OUTPATIENT)
Dept: RADIOLOGY | Facility: MEDICAL CENTER | Age: 84
DRG: 983 | End: 2019-07-06
Attending: HOSPITALIST
Payer: MEDICARE

## 2019-07-06 ENCOUNTER — HOSPITAL ENCOUNTER (INPATIENT)
Facility: MEDICAL CENTER | Age: 84
LOS: 1 days | DRG: 983 | End: 2019-07-10
Attending: HOSPITALIST | Admitting: HOSPITALIST
Payer: MEDICARE

## 2019-07-06 DIAGNOSIS — E78.5 HYPERLIPIDEMIA, UNSPECIFIED HYPERLIPIDEMIA TYPE: ICD-10-CM

## 2019-07-06 DIAGNOSIS — H53.131 ACUTE VISUAL LOSS, RIGHT: ICD-10-CM

## 2019-07-06 DIAGNOSIS — G89.18 POSTOPERATIVE PAIN: ICD-10-CM

## 2019-07-06 DIAGNOSIS — I65.21 STENOSIS OF RIGHT CAROTID ARTERY: Primary | ICD-10-CM

## 2019-07-06 LAB
CHOLEST SERPL-MCNC: 125 MG/DL (ref 100–199)
CRP SERPL HS-MCNC: 0.32 MG/DL (ref 0–0.75)
EST. AVERAGE GLUCOSE BLD GHB EST-MCNC: 111 MG/DL
HBA1C MFR BLD: 5.5 % (ref 0–5.6)
HDLC SERPL-MCNC: 53 MG/DL
LDLC SERPL CALC-MCNC: 58 MG/DL
TRIGL SERPL-MCNC: 71 MG/DL (ref 0–149)

## 2019-07-06 PROCEDURE — 700102 HCHG RX REV CODE 250 W/ 637 OVERRIDE(OP): Performed by: HOSPITALIST

## 2019-07-06 PROCEDURE — 70547 MR ANGIOGRAPHY NECK W/O DYE: CPT

## 2019-07-06 PROCEDURE — 80061 LIPID PANEL: CPT

## 2019-07-06 PROCEDURE — 86140 C-REACTIVE PROTEIN: CPT

## 2019-07-06 PROCEDURE — 70544 MR ANGIOGRAPHY HEAD W/O DYE: CPT

## 2019-07-06 PROCEDURE — 99358 PROLONG SERVICE W/O CONTACT: CPT | Performed by: HOSPITALIST

## 2019-07-06 PROCEDURE — G0378 HOSPITAL OBSERVATION PER HR: HCPCS

## 2019-07-06 PROCEDURE — 99220 PR INITIAL OBSERVATION CARE,LEVL III: CPT | Mod: 25 | Performed by: HOSPITALIST

## 2019-07-06 PROCEDURE — A9270 NON-COVERED ITEM OR SERVICE: HCPCS | Performed by: HOSPITALIST

## 2019-07-06 PROCEDURE — 36415 COLL VENOUS BLD VENIPUNCTURE: CPT

## 2019-07-06 PROCEDURE — 99205 OFFICE O/P NEW HI 60 MIN: CPT | Performed by: PSYCHIATRY & NEUROLOGY

## 2019-07-06 PROCEDURE — 83036 HEMOGLOBIN GLYCOSYLATED A1C: CPT

## 2019-07-06 PROCEDURE — 70551 MRI BRAIN STEM W/O DYE: CPT

## 2019-07-06 RX ORDER — ALPRAZOLAM 0.5 MG/1
0.5 TABLET ORAL NIGHTLY PRN
Status: DISCONTINUED | OUTPATIENT
Start: 2019-07-06 | End: 2019-07-06

## 2019-07-06 RX ORDER — AMOXICILLIN 250 MG
2 CAPSULE ORAL 2 TIMES DAILY
Status: DISCONTINUED | OUTPATIENT
Start: 2019-07-06 | End: 2019-07-10 | Stop reason: HOSPADM

## 2019-07-06 RX ORDER — SODIUM CHLORIDE 9 MG/ML
1000 INJECTION, SOLUTION INTRAVENOUS ONCE
Status: ACTIVE | OUTPATIENT
Start: 2019-07-06 | End: 2019-07-07

## 2019-07-06 RX ORDER — SIMVASTATIN 20 MG
20 TABLET ORAL EVERY EVENING
Status: DISCONTINUED | OUTPATIENT
Start: 2019-07-06 | End: 2019-07-06

## 2019-07-06 RX ORDER — ACETAMINOPHEN 325 MG/1
650 TABLET ORAL EVERY 6 HOURS PRN
Status: DISCONTINUED | OUTPATIENT
Start: 2019-07-06 | End: 2019-07-10 | Stop reason: HOSPADM

## 2019-07-06 RX ORDER — ALPRAZOLAM 0.5 MG/1
0.5 TABLET ORAL NIGHTLY PRN
Status: DISCONTINUED | OUTPATIENT
Start: 2019-07-06 | End: 2019-07-10 | Stop reason: HOSPADM

## 2019-07-06 RX ORDER — ONDANSETRON 4 MG/1
4 TABLET, ORALLY DISINTEGRATING ORAL EVERY 4 HOURS PRN
Status: DISCONTINUED | OUTPATIENT
Start: 2019-07-06 | End: 2019-07-10 | Stop reason: HOSPADM

## 2019-07-06 RX ORDER — POLYETHYLENE GLYCOL 3350 17 G/17G
1 POWDER, FOR SOLUTION ORAL
Status: DISCONTINUED | OUTPATIENT
Start: 2019-07-06 | End: 2019-07-10 | Stop reason: HOSPADM

## 2019-07-06 RX ORDER — BUDESONIDE AND FORMOTEROL FUMARATE DIHYDRATE 160; 4.5 UG/1; UG/1
2 AEROSOL RESPIRATORY (INHALATION)
Status: DISCONTINUED | OUTPATIENT
Start: 2019-07-06 | End: 2019-07-06

## 2019-07-06 RX ORDER — BISACODYL 10 MG
10 SUPPOSITORY, RECTAL RECTAL
Status: DISCONTINUED | OUTPATIENT
Start: 2019-07-06 | End: 2019-07-10 | Stop reason: HOSPADM

## 2019-07-06 RX ORDER — ONDANSETRON 2 MG/ML
4 INJECTION INTRAMUSCULAR; INTRAVENOUS EVERY 4 HOURS PRN
Status: DISCONTINUED | OUTPATIENT
Start: 2019-07-06 | End: 2019-07-10 | Stop reason: HOSPADM

## 2019-07-06 RX ORDER — ALPRAZOLAM 0.5 MG/1
0.5 TABLET ORAL 2 TIMES DAILY PRN
Status: DISCONTINUED | OUTPATIENT
Start: 2019-07-06 | End: 2019-07-06

## 2019-07-06 RX ORDER — LISINOPRIL 20 MG/1
40 TABLET ORAL DAILY
Status: DISCONTINUED | OUTPATIENT
Start: 2019-07-06 | End: 2019-07-10 | Stop reason: HOSPADM

## 2019-07-06 RX ORDER — BUDESONIDE AND FORMOTEROL FUMARATE DIHYDRATE 160; 4.5 UG/1; UG/1
2 AEROSOL RESPIRATORY (INHALATION) 2 TIMES DAILY
Status: DISCONTINUED | OUTPATIENT
Start: 2019-07-06 | End: 2019-07-10 | Stop reason: HOSPADM

## 2019-07-06 RX ORDER — SIMVASTATIN 20 MG
40 TABLET ORAL EVERY EVENING
Status: DISCONTINUED | OUTPATIENT
Start: 2019-07-06 | End: 2019-07-10 | Stop reason: HOSPADM

## 2019-07-06 RX ORDER — ALPRAZOLAM 0.25 MG/1
0.25 TABLET ORAL
Status: DISCONTINUED | OUTPATIENT
Start: 2019-07-07 | End: 2019-07-10 | Stop reason: HOSPADM

## 2019-07-06 RX ORDER — ALBUTEROL SULFATE 90 UG/1
2 AEROSOL, METERED RESPIRATORY (INHALATION) EVERY 4 HOURS PRN
Status: DISCONTINUED | OUTPATIENT
Start: 2019-07-06 | End: 2019-07-10 | Stop reason: HOSPADM

## 2019-07-06 RX ADMIN — BUDESONIDE AND FORMOTEROL FUMARATE DIHYDRATE 2 PUFF: 160; 4.5 AEROSOL RESPIRATORY (INHALATION) at 05:33

## 2019-07-06 RX ADMIN — ASPIRIN 81 MG: 81 TABLET ORAL at 05:33

## 2019-07-06 RX ADMIN — ALBUTEROL SULFATE 2 PUFF: 90 AEROSOL, METERED RESPIRATORY (INHALATION) at 22:25

## 2019-07-06 RX ADMIN — ALPRAZOLAM 0.5 MG: 0.5 TABLET ORAL at 22:30

## 2019-07-06 RX ADMIN — LISINOPRIL 20 MG: 20 TABLET ORAL at 05:33

## 2019-07-06 RX ADMIN — BUDESONIDE AND FORMOTEROL FUMARATE DIHYDRATE 2 PUFF: 160; 4.5 AEROSOL RESPIRATORY (INHALATION) at 18:00

## 2019-07-06 RX ADMIN — SIMVASTATIN 40 MG: 20 TABLET, FILM COATED ORAL at 16:57

## 2019-07-06 RX ADMIN — ALPRAZOLAM 0.5 MG: 0.5 TABLET ORAL at 12:08

## 2019-07-06 RX ADMIN — ALBUTEROL SULFATE 2 PUFF: 90 AEROSOL, METERED RESPIRATORY (INHALATION) at 10:45

## 2019-07-06 ASSESSMENT — ENCOUNTER SYMPTOMS
COUGH: 0
HEADACHES: 0
NAUSEA: 0
VOMITING: 0
CHILLS: 0
DIARRHEA: 0
ABDOMINAL PAIN: 0
TINGLING: 0
FEVER: 0
SORE THROAT: 0
PALPITATIONS: 0
DEPRESSION: 0
PHOTOPHOBIA: 0
WHEEZING: 0
DIZZINESS: 0
FOCAL WEAKNESS: 0
MYALGIAS: 0
SHORTNESS OF BREATH: 0

## 2019-07-06 ASSESSMENT — COGNITIVE AND FUNCTIONAL STATUS - GENERAL
MOBILITY SCORE: 24
SUGGESTED CMS G CODE MODIFIER DAILY ACTIVITY: CH
DAILY ACTIVITIY SCORE: 24
SUGGESTED CMS G CODE MODIFIER MOBILITY: CH

## 2019-07-06 ASSESSMENT — LIFESTYLE VARIABLES
EVER_SMOKED: YES
ALCOHOL_USE: NO

## 2019-07-06 ASSESSMENT — PATIENT HEALTH QUESTIONNAIRE - PHQ9
2. FEELING DOWN, DEPRESSED, IRRITABLE, OR HOPELESS: NOT AT ALL
SUM OF ALL RESPONSES TO PHQ9 QUESTIONS 1 AND 2: 0
1. LITTLE INTEREST OR PLEASURE IN DOING THINGS: NOT AT ALL

## 2019-07-06 NOTE — PROGRESS NOTES
Pt  Pushed call light to inform me that she is starting to see floaters out of her left eye now. Vision still clear but floaters just started.

## 2019-07-06 NOTE — ASSESSMENT & PLAN NOTE
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   Patient was outside of the window for TPA.   Neurology initially  consulted.   Continue aspirin, statin therapy  Ultrasound with severe, greater than 70% right-sided ICA stenosis.   Vascular surgery evaluated her and she underwent right carotid endarterectomy on July 9, 2019  Continue aspirin, Plavix, statin   Appreciate vascular surgery accommodations.

## 2019-07-06 NOTE — PROGRESS NOTES
Direct-admit patient from [facility] for central retinal artery occlusion. Dr Stark sending. Dr Raphael accepting. Patient had presented there for vision changes to right eye. Had imaging there that showed high suspicion for central retinal artery occlusion. Will need further imaging (MRI) and neuro consult. ADT has been signed-and-held at 0025; floor staff to release order when patient arrives. Patient coming via ground transport.

## 2019-07-06 NOTE — PROGRESS NOTES
In addition to releasing the ADT order when patient arrives, floor staff to contact the on-call hospitalist for further orders.

## 2019-07-06 NOTE — CONSULTS
Hospital Neurology Consult:    Referring Physician: Chip Garcia M.D.    Reason for consultation: Stroke/Retinal artery occlusion    HPI: Pat Sevilla is a 86 y.o. female with history of coronary artery disease, myocardial infarction, COPD, hypertension, hyperlipidemia presenting to the hospital for possible stroke and consulted for stroke.  The patient was in her usual state of health when she had sudden painless loss of vision on the right eye.  She states that she lost all vision on the right eye.  She delayed going to the hospital until approximately 6 PM at which time she presented to [facility].  Carotid ultrasound was performed which showed less than 50% stenosis bilaterally and she was transferred here for further care.  She has recovered some vision on the right eye however can basically only determine shadows and light.  She did not have any other numbness or weakness with this event.  She has no other complaints.    ROS:     As above. All other systems reviewed and are negative.    Past Medical History:    has a past medical history of Anemia (9/15/2014); CAD (coronary artery disease) (07/2008); Carotid stenosis, bilateral (04/2019); Cerebrovascular disease; COPD (chronic obstructive pulmonary disease) (HCC); HTN (hypertension); Hyperlipidemia; and Syncope.    FHx:  family history includes Heart Disease in her brother, father, and mother.    SHx:   reports that she quit smoking about 6 years ago. Her smoking use included Cigarettes. She has a 30.00 pack-year smoking history. She has never used smokeless tobacco. She reports that she does not drink alcohol or use drugs.    Allergies:  Allergies   Allergen Reactions   • Codeine        Medications:    Current Facility-Administered Medications:   •  lisinopril (PRINIVIL) tablet 40 mg, 40 mg, Oral, DAILY, Maryam Loving M.D., 20 mg at 07/06/19 0533  •  aspirin EC (ECOTRIN) tablet 81 mg, 81 mg, Oral, DAILY, Maryam Loving M.D., 81 mg at  07/06/19 0533  •  albuterol inhaler 2 Puff, 2 Puff, Inhalation, Q4HRS PRN, Maryam Loving M.D., 2 Puff at 07/06/19 1045  •  senna-docusate (PERICOLACE or SENOKOT S) 8.6-50 MG per tablet 2 Tab, 2 Tab, Oral, BID **AND** polyethylene glycol/lytes (MIRALAX) PACKET 1 Packet, 1 Packet, Oral, QDAY PRN **AND** magnesium hydroxide (MILK OF MAGNESIA) suspension 30 mL, 30 mL, Oral, QDAY PRN **AND** bisacodyl (DULCOLAX) suppository 10 mg, 10 mg, Rectal, QDAY PRN, Maryam Loving M.D.  •  NS infusion 1,000 mL, 1,000 mL, Intravenous, Once, Maryam Loving M.D., Last Rate: 100 mL/hr at 07/06/19 0301, 1,000 mL at 07/06/19 0301  •  acetaminophen (TYLENOL) tablet 650 mg, 650 mg, Oral, Q6HRS PRN, Maryam Loving M.D.  •  ondansetron (ZOFRAN) syringe/vial injection 4 mg, 4 mg, Intravenous, Q4HRS PRN, Maryam Loving M.D.  •  ondansetron (ZOFRAN ODT) dispertab 4 mg, 4 mg, Oral, Q4HRS PRN, Maryam Loving M.D.  •  simvastatin (ZOCOR) tablet 40 mg, 40 mg, Oral, Q EVENING, Maryam Loving M.D.  •  budesonide-formoterol (SYMBICORT) 160-4.5 MCG/ACT inhaler 2 Puff, 2 Puff, Inhalation, BID, Maryam Loving M.D., 2 Puff at 07/06/19 0533  •  ALPRAZolam (XANAX) tablet 0.5 mg, 0.5 mg, Oral, HS PRN, Maryam Loving M.D.    Vitals:   Vitals:    07/06/19 0140 07/06/19 0500 07/06/19 0840   BP: (!) 163/49 128/42 129/40   Pulse: 70 71 77   Resp: 19 17 16   Temp: 36.2 °C (97.2 °F) 36.3 °C (97.4 °F) 36.4 °C (97.5 °F)   TempSrc: Temporal Temporal Temporal   SpO2: 97% 100% 96%   Weight: 47.6 kg (105 lb)         Labs:  No results found for: PROTHROMBTM, INR   Lab Results   Component Value Date/Time    WBC 5.3 06/20/2018 02:33 PM    RBC 3.85 06/20/2018 02:33 PM    HEMOGLOBIN 10.5 (L) 06/20/2018 02:33 PM    HEMATOCRIT 31.5 (L) 06/20/2018 02:33 PM    MCV 82 06/20/2018 02:33 PM    MCH 27.3 06/20/2018 02:33 PM    MCHC 33.3 06/20/2018 02:33 PM    NEUTSPOLYS 67 06/20/2018 02:33 PM    LYMPHOCYTES 18 06/20/2018 02:33 PM    MONOCYTES 10 06/20/2018 02:33 PM     EOSINOPHILS 4 06/20/2018 02:33 PM    BASOPHILS 1 06/20/2018 02:33 PM      Lab Results   Component Value Date/Time    SODIUM 143 02/16/2018 10:07 AM    SODIUM 140 07/12/2012 07:55 AM    POTASSIUM 4.5 02/16/2018 10:07 AM    POTASSIUM 4.3 07/12/2012 07:55 AM    CHLORIDE 102 02/16/2018 10:07 AM    CHLORIDE 99 07/12/2012 07:55 AM    CO2 26 02/16/2018 10:07 AM    CO2 25 07/12/2012 07:55 AM    GLUCOSE 98 02/16/2018 10:07 AM    GLUCOSE 95 07/12/2012 07:55 AM    BUN 11 02/16/2018 10:07 AM    BUN 11 07/12/2012 07:55 AM    CREATININE 0.95 02/16/2018 10:07 AM    CREATININE 0.84 07/12/2012 07:55 AM    BUNCREATRAT 12 02/16/2018 10:07 AM    BUNCREATRAT 13 07/12/2012 07:55 AM      Lab Results   Component Value Date/Time    CHOLSTRLTOT 161 02/16/2018 10:07 AM    CHOLSTRLTOT 155 07/12/2012 07:55 AM    LDL 74 02/16/2018 10:07 AM    LDL 74 07/12/2012 07:55 AM    HDL 72 02/16/2018 10:07 AM    HDL 63 07/12/2012 07:55 AM    TRIGLYCERIDE 74 02/16/2018 10:07 AM    TRIGLYCERIDE 89 07/12/2012 07:55 AM       Lab Results   Component Value Date/Time    ALKPHOSPHAT 87 02/16/2018 10:07 AM    ALKPHOSPHAT 79 07/12/2012 07:55 AM    ASTSGOT 18 02/16/2018 10:07 AM    ASTSGOT 22 07/12/2012 07:55 AM    ALTSGPT 7 02/16/2018 10:07 AM    ALTSGPT 14 07/12/2012 07:55 AM    TBILIRUBIN 0.7 02/16/2018 10:07 AM    TBILIRUBIN 0.5 07/12/2012 07:55 AM      Lab Results   Component Value Date/Time    TSH 0.947 09/06/2017 09:28 AM    TSH 0.719 10/20/2014 03:00 PM     Imaging/Testing:  CT head without contrast on 7/6/2019 obtained at [facility] was personally reviewed and with the exception of some white matter disease and some atrophy was unremarkable for ischemia or hemorrhage.    Physical Exam:     General: Well-appearing 86-year-old female in no acute distress  Cardio: Normal S1/S2. No peripheral edema.   Pulm: CTAX2. No respiratory distress.   Skin: Warm, dry, no rashes or lesions   Psychiatric: Appropriate affect. No active psychosis.  HEENT: Atraumatic head,  normal sclera and conjunctiva, moist oral mucosa. No lid lag.  Abdomen: Soft, non tender. No masses or hepatosplenomegaly.    Neurologic:  Mental Status:  AAOx4. Able to follow commands/cross midline. Speech fluent/nondysarthric. Language functions intact. No neglect/apraxia.  Cranial Nerves: Right sided anisocoria with an unreactive pupil.  Extraocular movements intact.  Face is symmetric palate and tongue are midline.  Motor: Normal muscle tone and bulk with strength 5/5 throughout  Reflexes: 2/4 throughout  Coordination: Finger-to-nose without ataxia  Sensation: Normal to light touch throughout  Gait/Station: Deferred    Assessment/Plan:    Pat Sevilla is a 86 y.o. female with history of coronary artery disease, myocardial infarction, COPD, hypertension, hyperlipidemia presenting to the hospital for possible stroke and consulted for stroke.  The patient presents with isolated painless right-sided loss of vision with anisocoria to that side which would be consistent perhaps with a central retinal artery occlusion however she has regained some mild vision.  Reportedly, in [facility] carotids were done with less than 50% bilaterally however we do not have those images and as such they will be repeated.  Otherwise, vascular risk factors will have to be addressed and treated appropriately.    Plan:   -Aim for normotension  -Ordered MRI of the brain without contrast and MRA head and neck without contrast  -Ordered lipid profile and hemoglobin A1c  -Echocardiogram is pending  -Initiate smoking cessation/stroke education.  -Schedule evaluation with PT/OT/ST  -Continue aspirin 81 mg daily  -We will follow with above results  -Plan discussed with consulting physician and patient's nurse.       Bertin Scruggs M.D., Diplomat of the American Board of Psychiatry and Neurology  Diplomat of [employer] Epilepsy Subspecialty   Assistant Clinical Professor, [facility] Neurology Consultant

## 2019-07-06 NOTE — CARE PLAN
Problem: Safety  Goal: Will remain free from injury  Outcome: PROGRESSING AS EXPECTED  Bed alarm on, call light in reach, non skid footwear on. Pt calls before getting up.    Problem: Knowledge Deficit  Goal: Knowledge of disease process/condition, treatment plan, diagnostic tests, and medications will improve  Outcome: PROGRESSING SLOWER THAN EXPECTED  Will continue to educate from RN/MD when test results are ready. Will educate on condition and plan.

## 2019-07-06 NOTE — H&P
Hospital Medicine History & Physical Note    Date of Service  7/6/2019    Primary Care Physician  Florian Henley M.D.    Consultants  Dr. Carrington, neurology    Code Status  Full    Chief Complaint  Transferred from outside facility for right vision loss    History of Presenting Illness  86 y.o. female who presented on 7/6/2019 in transfer from outside facility for right vision loss.  The patient presented to an outside hospital earlier yesterday when she developed sudden right vision loss at 12:45 PM while she was sitting in her car at the car wash.  She states that this is painless and that she had no prior symptoms before the event occurred.  She denies any vision changes, headache, lightheadedness, chest pain, shortness of breath, nausea or diaphoresis.  Her left vision remains intact.  She does carry a history of bilateral cataract surgery in 2007 but her vision has been stable since.  This is the first time something like this has occurred.  Prior to this, the patient states that she was in her usual state of health which was good, she has had no recent URIs, fevers, chills, diarrhea or dysuria.    At the outside facility, work-up including WBC 5.6 hemoglobin 9.7 hematocrit 29.7 platelet 248 sodium 137 potassium 4.6 chloride 100 CO2 28 BUN 16 creatinine 1.38 and glucose 108.  INR 1.0 and ESR 31.  CT scan was completed but we are awaiting arrival of the formal read as it was not sent with the patient during transfer.  By report, ophthalmology was consulted by the outside hospital and recommended transfer to our facility for higher level of care and neurologist consultation.      Review of Systems  Review of Systems   Constitutional: Negative for chills and fever.   HENT: Negative for congestion and sore throat.    Eyes: Negative for photophobia.   Respiratory: Negative for cough, shortness of breath and wheezing.    Cardiovascular: Negative for chest pain and palpitations.   Gastrointestinal: Negative for  abdominal pain, diarrhea, nausea and vomiting.   Genitourinary: Negative for dysuria.   Musculoskeletal: Negative for myalgias.   Skin: Negative.    Neurological: Negative for dizziness, tingling, focal weakness and headaches.        Complete right vision loss   Psychiatric/Behavioral: Negative for depression and suicidal ideas.       Past Medical History  Past Medical History:   Diagnosis Date   • Carotid stenosis, bilateral 04/2019    Carotid ultrasound with <50% stenosis bilaterally.   • Anemia 9/15/2014    normocytic   • CAD (coronary artery disease) 07/2008     PCI/BMS x 2 to the LCx, mid (MiniVision 2.25 x 12mm) and proximal (MiniVision 2.0 x 12mm). March 2016: MPI with no ischemia or infarct, LVEF 70%.   • Cerebrovascular disease    • COPD (chronic obstructive pulmonary disease) ([facility])     Followed by pulmonology   • HTN (hypertension)    • Hyperlipidemia    • Syncope        Surgical History  Past Surgical History:   Procedure Laterality Date   • CARDIAC CATH, LEFT HEART         Family History  Family History   Problem Relation Age of Onset   • Heart Disease Mother    • Heart Disease Father    • Heart Disease Brother        Social History  Social History   Substance Use Topics   • Smoking status: Former Smoker     Packs/day: 0.50     Years: 60.00     Types: Cigarettes     Quit date: 2/20/2013   • Smokeless tobacco: Never Used   • Alcohol use No       Allergies  Allergies   Allergen Reactions   • Codeine        Medications  No current facility-administered medications on file prior to encounter.      Current Outpatient Prescriptions on File Prior to Encounter   Medication Sig Dispense Refill   • [START ON 8/25/2019] ALPRAZolam (XANAX) 0.5 MG Tab Take 1 Tab by mouth at bedtime as needed for Sleep for up to 30 days. 1/2 in the morning and 1 at night 45 Tab 0   • lisinopril (PRINIVIL, ZESTRIL) 40 MG tablet TAKE ONE TABLET BY MOUTH DAILY 90 Tab 1   • nitroglycerin (NITROSTAT) 0.4 MG SL Tab Place 1 Tab under tongue  as needed for Chest Pain. Not to exceed 3 tablets in 15 minutes 25 Tab 5   • azithromycin (ZITHROMAX) 250 MG Tab      • ADVAIR DISKUS 500-50 MCG/DOSE AEROSOL POWDER, BREATH ACTIVATED      • INCRUSE ELLIPTA 62.5 MCG/INH AEROSOL POWDER, BREATH ACTIVATED      • fluticasone-salmeterol (ADVAIR) 500-50 MCG/DOSE AEROSOL POWDER, BREATH ACTIVATED Inhale 1 Puff by mouth every 12 hours.     • simvastatin (ZOCOR) 20 MG Tab TAKE ONE TABLET BY MOUTH EVERY EVENING 90 Tab 2   • lisinopril (PRINIVIL) 20 MG Tab TAKE ONE TABLET BY MOUTH DAILY 90 Tab 0   • lidocaine (XYLOCAINE) 5 % Ointment Apply  to affected area(s) as needed.     • aspirin EC (ECOTRIN) 81 MG TBEC Take 81 mg by mouth every day. Indications: Heart Attack     • albuterol (VENTOLIN OR PROVENTIL) 108 (90 BASE) MCG/ACT AERS Inhale 2 Puffs by mouth every 6 hours as needed. ud          Physical Exam  Hemodynamics  Temp (24hrs), Av.2 °C (97.2 °F), Min:36.2 °C (97.2 °F), Max:36.2 °C (97.2 °F)   Temperature: 36.2 °C (97.2 °F)  Pulse  Av  Min: 70  Max: 70    Blood Pressure : (!) 163/49      Respiratory      Respiration: 19, Pulse Oximetry: 97 %        RUL Breath Sounds: Fine Crackles (clears with cough), RLL Breath Sounds: Diminished, KEVIN Breath Sounds: Fine Crackles (clears woth cough), LLL Breath Sounds: Diminished    Physical Exam   Constitutional: She is oriented to person, place, and time. No distress.   Thin, frail, elderly   HENT:   Head: Normocephalic and atraumatic.   Right Ear: External ear normal.   Left Ear: External ear normal.   Eyes: EOM are normal. Right eye exhibits no discharge. Left eye exhibits no discharge.   Neck: Neck supple. No JVD present.   Cardiovascular: Normal rate, regular rhythm and normal heart sounds.    Pulmonary/Chest: Effort normal and breath sounds normal. No respiratory distress. She exhibits no tenderness.   Abdominal: Soft. Bowel sounds are normal. She exhibits no distension. There is no tenderness.   Musculoskeletal: Normal range  of motion. She exhibits no edema.   Neurological: She is alert and oriented to person, place, and time. No cranial nerve deficit.   Skin: Skin is warm and dry. She is not diaphoretic. No erythema.   Psychiatric: She has a normal mood and affect. Her behavior is normal.   Nursing note and vitals reviewed.    Capillary refill less than 3 seconds, distal pulses intact    Laboratory:          No results for input(s): ALTSGPT, ASTSGOT, ALKPHOSPHAT, TBILIRUBIN, DBILIRUBIN, GAMMAGT, AMYLASE, LIPASE, ALB, PREALBUMIN, GLUCOSE in the last 72 hours.              No results found for: TROPONINI    Imaging  No results found.      Assessment/Plan:  Anticipate that patient will need less than 2 midnights for management of the discussed medical issues.    * Acute visual loss, right   Assessment & Plan    By report, CT scan was obtained at the outside facility and there was concern for right central artery occlusion.  The outside facility did not send images nor do they send a report.  We have called over for images to be pushed onto our system.  I have discussed this patient with Dr. Carrington of neurology and at this point, the patient is far outside of the window for TPA although he did note that this is also not standard of care in this situation.  Additionally, there is no indication for systemic anticoagulation or steroid therapy.  The patient will be admitted to the neurological floor for every 4 hour neurology checks.  Her ESR was minimally elevated at 31, I will check an CRP.  I will continue risk modification and resume her home aspirin and Zocor but I will increase its dose to 40 mg.  I have ordered an MRI of the brain.  I look forward to any further recommendations from neurology.     HTN (hypertension)- (present on admission)   Assessment & Plan    Continue home lisinopril.     COPD (chronic obstructive pulmonary disease) (HCC)- (present on admission)   Assessment & Plan    No acute exacerbation of her chronic disease,  continue home inhalers.     CAD (coronary artery disease)- (present on admission)   Assessment & Plan    Continue home aspirin and Zocor, increasing Zocor dose as noted above.         Prophylaxis: Sequential compression devices for DVT prophylaxis, no PPI indicated, bowel protocol as needed    I spent a total of 35 minutes of non face to face time performing additional research, reviewing medical records from transferring facility, discussing plan of care with other healthcare providers. Start time: 2:00AM. End time: 2:35AM.

## 2019-07-06 NOTE — CARE PLAN
Problem: Safety  Goal: Will remain free from falls    Intervention: Assess risk factors for falls  Bed alarm set; Patient educated regarding call light and assisted mobilization

## 2019-07-06 NOTE — PROGRESS NOTES
86-year-old female right sided vision loss starting at 12:45 PM on 7/5/2019.  She was evaluated at [facility] and ophthalmology was consulted.  There was concern for central retinal artery occlusion and ophthalmology recommended transfer to [facility] for neurology consultation.    Temperature 98.3, heart rate 80, respiratory rate 16, /66, 100% on 4 L nasal cannula.

## 2019-07-07 ENCOUNTER — APPOINTMENT (OUTPATIENT)
Dept: RADIOLOGY | Facility: MEDICAL CENTER | Age: 84
DRG: 983 | End: 2019-07-07
Attending: HOSPITALIST
Payer: MEDICARE

## 2019-07-07 PROBLEM — I65.29 CAROTID ARTERY STENOSIS: Status: ACTIVE | Noted: 2017-12-06

## 2019-07-07 LAB
ANION GAP SERPL CALC-SCNC: 6 MMOL/L (ref 0–11.9)
BUN SERPL-MCNC: 12 MG/DL (ref 8–22)
CALCIUM SERPL-MCNC: 9.1 MG/DL (ref 8.5–10.5)
CHLORIDE SERPL-SCNC: 105 MMOL/L (ref 96–112)
CO2 SERPL-SCNC: 28 MMOL/L (ref 20–33)
CREAT SERPL-MCNC: 1.19 MG/DL (ref 0.5–1.4)
ERYTHROCYTE [DISTWIDTH] IN BLOOD BY AUTOMATED COUNT: 46.8 FL (ref 35.9–50)
GLUCOSE SERPL-MCNC: 101 MG/DL (ref 65–99)
HCT VFR BLD AUTO: 26.5 % (ref 37–47)
HGB BLD-MCNC: 8.3 G/DL (ref 12–16)
MCH RBC QN AUTO: 29.4 PG (ref 27–33)
MCHC RBC AUTO-ENTMCNC: 31.3 G/DL (ref 33.6–35)
MCV RBC AUTO: 94 FL (ref 81.4–97.8)
PLATELET # BLD AUTO: 217 K/UL (ref 164–446)
PMV BLD AUTO: 10.8 FL (ref 9–12.9)
POTASSIUM SERPL-SCNC: 4.5 MMOL/L (ref 3.6–5.5)
RBC # BLD AUTO: 2.82 M/UL (ref 4.2–5.4)
SODIUM SERPL-SCNC: 139 MMOL/L (ref 135–145)
WBC # BLD AUTO: 4.7 K/UL (ref 4.8–10.8)

## 2019-07-07 PROCEDURE — 700102 HCHG RX REV CODE 250 W/ 637 OVERRIDE(OP): Performed by: HOSPITALIST

## 2019-07-07 PROCEDURE — A9270 NON-COVERED ITEM OR SERVICE: HCPCS | Performed by: HOSPITALIST

## 2019-07-07 PROCEDURE — 99226 PR SUBSEQUENT OBSERVATION CARE,LEVEL III: CPT | Performed by: HOSPITALIST

## 2019-07-07 PROCEDURE — G0378 HOSPITAL OBSERVATION PER HR: HCPCS

## 2019-07-07 PROCEDURE — A9270 NON-COVERED ITEM OR SERVICE: HCPCS | Performed by: PSYCHIATRY & NEUROLOGY

## 2019-07-07 PROCEDURE — 99214 OFFICE O/P EST MOD 30 MIN: CPT | Performed by: PSYCHIATRY & NEUROLOGY

## 2019-07-07 PROCEDURE — 80048 BASIC METABOLIC PNL TOTAL CA: CPT

## 2019-07-07 PROCEDURE — 36415 COLL VENOUS BLD VENIPUNCTURE: CPT

## 2019-07-07 PROCEDURE — 93880 EXTRACRANIAL BILAT STUDY: CPT

## 2019-07-07 PROCEDURE — 700102 HCHG RX REV CODE 250 W/ 637 OVERRIDE(OP): Performed by: PSYCHIATRY & NEUROLOGY

## 2019-07-07 PROCEDURE — 85027 COMPLETE CBC AUTOMATED: CPT

## 2019-07-07 RX ORDER — CLOPIDOGREL BISULFATE 75 MG/1
75 TABLET ORAL DAILY
Status: DISCONTINUED | OUTPATIENT
Start: 2019-07-08 | End: 2019-07-07

## 2019-07-07 RX ORDER — CLOPIDOGREL BISULFATE 75 MG/1
75 TABLET ORAL DAILY
Status: DISCONTINUED | OUTPATIENT
Start: 2019-07-07 | End: 2019-07-10

## 2019-07-07 RX ADMIN — BUDESONIDE AND FORMOTEROL FUMARATE DIHYDRATE 2 PUFF: 160; 4.5 AEROSOL RESPIRATORY (INHALATION) at 18:25

## 2019-07-07 RX ADMIN — CLOPIDOGREL BISULFATE 75 MG: 75 TABLET ORAL at 20:16

## 2019-07-07 RX ADMIN — ALPRAZOLAM 0.25 MG: 0.25 TABLET ORAL at 07:55

## 2019-07-07 RX ADMIN — BUDESONIDE AND FORMOTEROL FUMARATE DIHYDRATE 2 PUFF: 160; 4.5 AEROSOL RESPIRATORY (INHALATION) at 05:45

## 2019-07-07 RX ADMIN — ALPRAZOLAM 0.5 MG: 0.5 TABLET ORAL at 21:29

## 2019-07-07 RX ADMIN — ASPIRIN 81 MG: 81 TABLET ORAL at 05:45

## 2019-07-07 RX ADMIN — ALBUTEROL SULFATE 2 PUFF: 90 AEROSOL, METERED RESPIRATORY (INHALATION) at 20:18

## 2019-07-07 RX ADMIN — LISINOPRIL 40 MG: 20 TABLET ORAL at 05:45

## 2019-07-07 RX ADMIN — SIMVASTATIN 40 MG: 20 TABLET, FILM COATED ORAL at 18:25

## 2019-07-07 ASSESSMENT — ENCOUNTER SYMPTOMS
TREMORS: 0
BLURRED VISION: 1
STRIDOR: 0
ABDOMINAL PAIN: 0
FLANK PAIN: 0
VOMITING: 0
PALPITATIONS: 0
WHEEZING: 0
FEVER: 0
HALLUCINATIONS: 0
SHORTNESS OF BREATH: 0
CHILLS: 0
EYE REDNESS: 0
DIAPHORESIS: 0
FOCAL WEAKNESS: 0
COUGH: 0
BACK PAIN: 0
SPEECH CHANGE: 0
NECK PAIN: 0
WEAKNESS: 0
SENSORY CHANGE: 0
DIARRHEA: 0
EYE DISCHARGE: 0

## 2019-07-07 ASSESSMENT — LIFESTYLE VARIABLES: SUBSTANCE_ABUSE: 0

## 2019-07-07 NOTE — PROGRESS NOTES
Admitted by colleague this morning with acute right eye vision loss.  Suspect retinal artery occlusion.  Continue with aspirin, statin therapy.  Plan MRI and MRA of the head and neck.  Follow-up echocardiogram.  I examined patient, discussed findings and plan of care.  Case also discussed with Dr. Scruggs, neurology.

## 2019-07-07 NOTE — PROGRESS NOTES
Hospital Neurology Progress Note:     Interval History: No acute events overnight.  No complaints today.    Objective:   Vitals:    07/06/19 2310 07/07/19 0400 07/07/19 0402 07/07/19 0718   BP:  144/47  155/58   Pulse:  62  65   Resp:  16  16   Temp:  36.2 °C (97.1 °F)  36.6 °C (97.8 °F)   TempSrc:  Temporal  Temporal   SpO2: 100% 99% 99% 91%   Weight:           Labs:        Lab Results   Component Value Date/Time    WBC 4.7 (L) 07/07/2019 03:25 AM    RBC 2.82 (L) 07/07/2019 03:25 AM    HEMOGLOBIN 8.3 (L) 07/07/2019 03:25 AM    HEMATOCRIT 26.5 (L) 07/07/2019 03:25 AM    MCV 94.0 07/07/2019 03:25 AM    MCH 29.4 07/07/2019 03:25 AM    MCHC 31.3 (L) 07/07/2019 03:25 AM    MPV 10.8 07/07/2019 03:25 AM    NEUTSPOLYS 67 06/20/2018 02:33 PM    LYMPHOCYTES 18 06/20/2018 02:33 PM    MONOCYTES 10 06/20/2018 02:33 PM    EOSINOPHILS 4 06/20/2018 02:33 PM    BASOPHILS 1 06/20/2018 02:33 PM      Lab Results   Component Value Date/Time    SODIUM 139 07/07/2019 03:25 AM    POTASSIUM 4.5 07/07/2019 03:25 AM    CHLORIDE 105 07/07/2019 03:25 AM    CO2 28 07/07/2019 03:25 AM    GLUCOSE 101 (H) 07/07/2019 03:25 AM    BUN 12 07/07/2019 03:25 AM    CREATININE 1.19 07/07/2019 03:25 AM    CREATININE 0.84 07/12/2012 07:55 AM    BUNCREATRAT 12 02/16/2018 10:07 AM    BUNCREATRAT 13 07/12/2012 07:55 AM      Lab Results   Component Value Date/Time    CHOLSTRLTOT 125 07/06/2019 04:33 AM    LDL 58 07/06/2019 04:33 AM    HDL 53 07/06/2019 04:33 AM    TRIGLYCERIDE 71 07/06/2019 04:33 AM       Lab Results   Component Value Date/Time    ALKPHOSPHAT 87 02/16/2018 10:07 AM    ALKPHOSPHAT 79 07/12/2012 07:55 AM    ASTSGOT 18 02/16/2018 10:07 AM    ASTSGOT 22 07/12/2012 07:55 AM    ALTSGPT 7 02/16/2018 10:07 AM    ALTSGPT 14 07/12/2012 07:55 AM    TBILIRUBIN 0.7 02/16/2018 10:07 AM    TBILIRUBIN 0.5 07/12/2012 07:55 AM        Imaging/Testing:   MRI of the brain without contrast on 7/6/2019 was personally reviewed and showed evidence of global atrophy  however no evidence of further ischemia.    MRA of the head and neck was reviewed in chart and showed evidence of potential stenosis on the KENNETH but had movement artifact.     Carotid U/S showed 70% stenosis on the KENNETH.     Physical Exam:      General: Well-appearing 86-year-old female in no acute distress  Cardio: Normal S1/S2. No peripheral edema.   Pulm: CTAX2. No respiratory distress.   Skin: Warm, dry, no rashes or lesions   Psychiatric: Appropriate affect. No active psychosis.  HEENT: Atraumatic head, normal sclera and conjunctiva, moist oral mucosa. No lid lag.  Abdomen: Soft, non tender. No masses or hepatosplenomegaly.     Neurologic:  Mental Status:  AAOx4. Able to follow commands/cross midline. Speech fluent/nondysarthric. Language functions intact. No neglect/apraxia.  Cranial Nerves: Right sided anisocoria with an unreactive pupil.  Extraocular movements intact.  Face is symmetric palate and tongue are midline.  Motor: Normal muscle tone and bulk with strength 5/5 throughout  Reflexes: 2/4 throughout  Coordination: Finger-to-nose without ataxia  Sensation: Normal to light touch throughout  Gait/Station: Deferred    Patient seen and examined on 7/7/2019 and compared to physical exam on 7/6/2019 no significant clinical change was seen.    Assessment/Plan:    Pat Sevilla is a 86 y.o. female with history of coronary artery disease, myocardial infarction, COPD, hypertension, hyperlipidemia presenting to the hospital for possible stroke and consulted for stroke.  The patient presents with isolated painless right-sided loss of vision with anisocoria to that side which would be consistent perhaps with a central retinal artery occlusion however she has regained some mild vision.  Vascular imaging showed potential KENNETH stenosis, which was confirmed with carotid U/S. She will need revascularization in this regard.    Plan:  1.  Aim for normotension  2.  Hemoglobin A1c 5.5.  At goal.  3.  LDL 58.  At goal.  4.   Aspirin 81 mg daily  5.  No need for cardiac monitoring. 70% KENNETH stenosis, needs vascular surgery consult.   6.  Neurology signing off.  Please call with any further questions or concerns.  7.  Plan discussed with consulting physician and patient's nurse.     Bertin Scruggs M.D., Diplomat of the American Board of Psychiatry and Neurology  Diplomat of [facility] Ep[facility]   Assistant Clinical Professor, [facility] Neurology Consultant    Addended: KENNETH stenosis found. Changes plan. Now will need vascular surgery, no cardiac rhythm monitoring. Also needs DAPT. Starting Plavix 75mg PO daily.

## 2019-07-07 NOTE — CARE PLAN
Problem: Communication  Goal: The ability to communicate needs accurately and effectively will improve  Outcome: PROGRESSING AS EXPECTED  Discussed POC with patient at bedside, whiteboard updated, all questions answered and patient encouraged to express concerns and questions.     Problem: Safety  Goal: Will remain free from falls  Outcome: PROGRESSING AS EXPECTED  Bed in lowest position. Call light in place. Personal belongings close and patient in a hospital gown.  Non-skid slippers on. Patient informed of nurses name and POC.Fall risks assessed and proper interventions in place. Bed alarm on.

## 2019-07-07 NOTE — RESPIRATORY CARE
COPD EDUCATION by COPD CLINICAL EDUCATOR  7/7/2019 at 6:52 AM by Tami Vee     Patient reviewed by COPD education team. Patient does not have an order for Respiratory Care Protocol therefore does not qualify for the COPD program.

## 2019-07-08 LAB
FERRITIN SERPL-MCNC: 18.9 NG/ML (ref 10–291)
IRON SATN MFR SERPL: 22 % (ref 15–55)
IRON SERPL-MCNC: 90 UG/DL (ref 40–170)
TIBC SERPL-MCNC: 410 UG/DL (ref 250–450)
TRANSFERRIN SERPL-MCNC: 297 MG/DL (ref 200–370)
TSH SERPL DL<=0.005 MIU/L-ACNC: 0.58 UIU/ML (ref 0.38–5.33)
VIT B12 SERPL-MCNC: 614 PG/ML (ref 211–911)

## 2019-07-08 PROCEDURE — 700102 HCHG RX REV CODE 250 W/ 637 OVERRIDE(OP): Performed by: HOSPITALIST

## 2019-07-08 PROCEDURE — A9270 NON-COVERED ITEM OR SERVICE: HCPCS | Performed by: HOSPITALIST

## 2019-07-08 PROCEDURE — 84466 ASSAY OF TRANSFERRIN: CPT

## 2019-07-08 PROCEDURE — 84443 ASSAY THYROID STIM HORMONE: CPT

## 2019-07-08 PROCEDURE — 99225 PR SUBSEQUENT OBSERVATION CARE,LEVEL II: CPT | Performed by: HOSPITALIST

## 2019-07-08 PROCEDURE — 36415 COLL VENOUS BLD VENIPUNCTURE: CPT

## 2019-07-08 PROCEDURE — 83540 ASSAY OF IRON: CPT

## 2019-07-08 PROCEDURE — G0378 HOSPITAL OBSERVATION PER HR: HCPCS

## 2019-07-08 PROCEDURE — 82728 ASSAY OF FERRITIN: CPT

## 2019-07-08 PROCEDURE — 83550 IRON BINDING TEST: CPT

## 2019-07-08 PROCEDURE — 82607 VITAMIN B-12: CPT

## 2019-07-08 RX ADMIN — LISINOPRIL 40 MG: 20 TABLET ORAL at 06:07

## 2019-07-08 RX ADMIN — BUDESONIDE AND FORMOTEROL FUMARATE DIHYDRATE 2 PUFF: 160; 4.5 AEROSOL RESPIRATORY (INHALATION) at 18:00

## 2019-07-08 RX ADMIN — SIMVASTATIN 40 MG: 20 TABLET, FILM COATED ORAL at 18:12

## 2019-07-08 RX ADMIN — ALPRAZOLAM 0.5 MG: 0.5 TABLET ORAL at 21:36

## 2019-07-08 RX ADMIN — ASPIRIN 81 MG: 81 TABLET ORAL at 06:07

## 2019-07-08 RX ADMIN — BUDESONIDE AND FORMOTEROL FUMARATE DIHYDRATE 2 PUFF: 160; 4.5 AEROSOL RESPIRATORY (INHALATION) at 06:07

## 2019-07-08 RX ADMIN — SENNOSIDES, DOCUSATE SODIUM 2 TABLET: 50; 8.6 TABLET, FILM COATED ORAL at 18:12

## 2019-07-08 RX ADMIN — SENNOSIDES, DOCUSATE SODIUM 2 TABLET: 50; 8.6 TABLET, FILM COATED ORAL at 06:00

## 2019-07-08 RX ADMIN — ALPRAZOLAM 0.25 MG: 0.25 TABLET ORAL at 03:30

## 2019-07-08 RX ADMIN — ALBUTEROL SULFATE 2 PUFF: 90 AEROSOL, METERED RESPIRATORY (INHALATION) at 20:28

## 2019-07-08 ASSESSMENT — ENCOUNTER SYMPTOMS
CHILLS: 0
BLURRED VISION: 1
NECK PAIN: 0
BACK PAIN: 0
DIAPHORESIS: 0
VOMITING: 0
COUGH: 0
PALPITATIONS: 0
SHORTNESS OF BREATH: 0
TREMORS: 0
FEVER: 0
ABDOMINAL PAIN: 0
WEAKNESS: 0
EYE DISCHARGE: 0
SPEECH CHANGE: 0
SENSORY CHANGE: 0
WHEEZING: 0
EYE REDNESS: 0
DIARRHEA: 0
FOCAL WEAKNESS: 0

## 2019-07-08 NOTE — PROGRESS NOTES
Vascular    OR scheduled for Tuesday evening 1730.  Patient can eat breakfast but needs to be NPO after 0900 in preparation for surgery later.    Nazario Manning MD  [facility] (General and Vascular Surgery)  Cell: [phone] (text or call is fine, if you don't reach me please try my office)  Office: [phone]  __________________________________________________________________  Patient:Pat CENTENO Sevilla   MRN:[id number]   CSN:[id number]    7/8/2019    3:14 PM

## 2019-07-08 NOTE — DISCHARGE PLANNING
Care Transition Team Assessment    Information Source  Orientation : Oriented x 4  Information Given By: Patient  Informant's Name: Pat   Who is responsible for making decisions for patient? : Patient    Readmission Evaluation  Is this a readmission?: No    Elopement Risk  Legal Hold: No  Ambulatory or Self Mobile in Wheelchair: No-Not an Elopement Risk  Elopement Risk: Not at Risk for Elopement    Interdisciplinary Discharge Planning  Does Admitting Nurse Feel This Could be a Complex Discharge?: No  Primary Care Physician: Dr. Florian Henley  Lives with - Patient's Self Care Capacity: Alone and Able to Care For Self, Adult Children  Patient or legal guardian wants to designate a caregiver (see row info): No  Support Systems: Children  Housing / Facility: [address]  Do You Take your Prescribed Medications Regularly: Yes  Able to Return to Previous ADL's: Yes  Mobility Issues: Yes  Prior Services: Other (Comments) (Home Oxygen)  Patient Expects to be Discharged to:: Home  Assistance Needed: Yes  Durable Medical Equipment: Home Oxygen, Walker  DME Provider / Phone: Jose Miguel [phone]    Discharge Preparedness  What is your plan after discharge?: Home health care  What are your discharge supports?: Child  Prior Functional Level: Needs Assist with Activities of Daily Living, Needs Assist with Medication Management  Difficulity with ADLs: Walking, Toileting, Bathing  Difficulty with ADLs Comment: Has home oxygen, walker, and shower chair.  Difficulity with IADLs: Driving, Laundry    Functional Assesment  Prior Functional Level: Needs Assist with Activities of Daily Living, Needs Assist with Medication Management    Finances  Financial Barriers to Discharge: No  Prescription Coverage: Yes    Vision / Hearing Impairment  Vision Impairment : Yes  Right Eye Vision: Impaired  Hearing Impairment : No    Values / Beliefs / Concerns  Values / Beliefs Concerns : No    Advance Directive  Advance Directive?: Living  Will    Domestic Abuse  Have you ever been the victim of abuse or violence?: No  Physical Abuse or Sexual Abuse: No  Verbal Abuse or Emotional Abuse: No  Possible Abuse Reported to:: Not Applicable    Psychological Assessment  History of Substance Abuse: None  History of Psychiatric Problems: No  Non-compliant with Treatment: No  Newly Diagnosed Illness: No    Discharge Risks or Barriers  Discharge risks or barriers?: No    Anticipated Discharge Information  Anticipated discharge disposition: Home, [facility]  Discharge Address: [address] [place] [address], [facility] [address]  [place] [id number]  Discharge Contact Phone Number: [phone]

## 2019-07-08 NOTE — PROGRESS NOTES
Vascular    Formal consult to follow  Symptomatic right ICA stenosis  Planning right carotid endarterectomy tonight or tomorrow depending on OR availability    Nazario Manning MD  [facility] (General and Vascular Surgery)  Cell: [phone] (text or call is fine, if you don't reach me please try my office)  Office: [phone]  __________________________________________________________________  Patient:Pat A Roel   MRN:[id number]   CSN:[id number]    7/8/2019    1:34 PM

## 2019-07-08 NOTE — ASSESSMENT & PLAN NOTE
Bilateral with greater than 70% right ICA stenosis.  Vascular surgery evaluated her and she is going to have right carotid endarterectomy today by vascular surgery on July 9, 2019.  I discussed plan of care with her.

## 2019-07-08 NOTE — CARE PLAN
Problem: Communication  Goal: The ability to communicate needs accurately and effectively will improve    Intervention: Educate patient and significant other/support system about the plan of care, procedures, treatments, medications and allow for questions  Discussed plan of care with patient. MDs at bedside to address any questions and concerns. Encourage patient to take notes of future questions. RN at bedside       Problem: Venous Thromboembolism (VTW)/Deep Vein Thrombosis (DVT) Prevention:  Goal: Patient will participate in Venous Thrombosis (VTE)/Deep Vein Thrombosis (DVT)Prevention Measures    Intervention: Encourage ambulation/mobilization at level directed by Physical Therapy in collaboration with Interdisciplinary Team  Patient is standby assist. Patient to edge of bed for meals. Encourage daily activity. SCD provided and education on importance of scds and activity

## 2019-07-08 NOTE — PROGRESS NOTES
Assumed pt care at 1900 and received bedside report.    Pt alert and oriented X 4. Pt denies chest pain, sob, nausea and vomiting, headache, and blurry or double vision. Pt denies numbness and tingling. Pt has R sided vision loss. Pt denies pain. Pt ambulating independently in room, gait steady.   POC discussed and education provided on administered medications. All questions and concerns addressed. Fall precautions, hourly rounding and Q4 hour neuro checks in place.

## 2019-07-08 NOTE — CARE PLAN
Problem: Bowel/Gastric:  Goal: Normal bowel function is maintained or improved  Outcome: PROGRESSING AS EXPECTED      Problem: Knowledge Deficit  Goal: Knowledge of the prescribed therapeutic regimen will improve  Outcome: PROGRESSING AS EXPECTED  POC discussed with patient, all questions answered.

## 2019-07-08 NOTE — CONSULTS
DATE OF CONSULTATION: 7/8/2019     REFERRING PHYSICIAN: Hospitalist, MD.     CONSULTING PHYSICIAN: Abel Yu M.D.      REASON FOR CONSULTATION: Retinal artery occlusion and high grade right carotid artery stenosis     HISTORY OF PRESENT ILLNESS: The patient is a 86 old female who presented to the emergency room on July 6, 2019 after being transferred from an outlying facility.  She reports that she developed acute onset of blindness in her right eye.  Patient has had no other focal neurologic deficits.  A work-up was initiated which included a carotid duplex which demonstrated a high-grade right carotid artery stenosis.  There is been no interval improvement in her vision in her right eye.  Evaluation by an ophthalmologist determined acute occlusion of the retinal artery.  Currently the patient is awake and alert she denies any focal neurologic deficits she has persistent visual deficit in the right eye.    PAST MEDICAL HISTORY:  has a past medical history of Anemia (9/15/2014); CAD (coronary artery disease) (07/2008); Carotid stenosis, bilateral (04/2019); Cerebrovascular disease; COPD (chronic obstructive pulmonary disease) ([facility]); HTN (hypertension); Hyperlipidemia; and Syncope.     PAST SURGICAL HISTORY:  has a past surgical history that includes cardiac cath, left heart.     ALLERGIES:   Allergies   Allergen Reactions   • Codeine         CURRENT MEDICATIONS:   Home Medications     Reviewed by Timothy Martin R.N. (Registered Nurse) on 07/06/19 at 0334  Med List Status: <None>   Medication Last Dose Status   ADVAIR DISKUS 500-50 MCG/DOSE AEROSOL POWDER, BREATH ACTIVATED 7/5/2019 Active   albuterol (VENTOLIN OR PROVENTIL) 108 (90 BASE) MCG/ACT AERS 7/5/2019 Active   ALPRAZolam (XANAX) 0.5 MG Tab 7/5/2019 Active   aspirin EC (ECOTRIN) 81 MG TBEC 7/5/2019 Active   azithromycin (ZITHROMAX) 250 MG Tab 7/5/2019 Active   fluticasone-salmeterol (ADVAIR) 500-50 MCG/DOSE AEROSOL POWDER, BREATH ACTIVATED 7/5/2019  Active   INCRUSE ELLIPTA 62.5 MCG/INH AEROSOL POWDER, BREATH ACTIVATED 7/5/2019 Active   lidocaine (XYLOCAINE) 5 % Ointment 7/5/2019 Active   lisinopril (PRINIVIL) 20 MG Tab 7/5/2019 Active   lisinopril (PRINIVIL, ZESTRIL) 40 MG tablet 7/5/2019 Active   nitroglycerin (NITROSTAT) 0.4 MG SL Tab 7/5/2019 Active   simvastatin (ZOCOR) 20 MG Tab 7/5/2019 Active                FAMILY HISTORY:   Family History   Problem Relation Age of Onset   • Heart Disease Mother    • Heart Disease Father    • Heart Disease Brother         SOCIAL HISTORY:   Social History     Social History Main Topics   • Smoking status: Former Smoker     Packs/day: 0.50     Years: 60.00     Types: Cigarettes     Quit date: 2/20/2013   • Smokeless tobacco: Never Used   • Alcohol use No   • Drug use: No   • Sexual activity: Not on file       Review of Systems:      Constitutional: Denies fevers, Denies weight changes  Eyes: Reports right eye blindness   ears/Nose/Throat/Mouth: Denies nasal congestion or sore throat   Cardiovascular: Denies chest pain or palpitations.  Respiratory: Denies shortness of breath, cough, and wheezing.  Gastrointestinal/Hepatic: Denies abdominal pain, nausea, vomiting, diarrhea, constipation or GI bleeding   Genitourinary: Denies dysuria or frequency  Musculoskeletal/Rheum: Denies  joint pain and swelling, no edema  Skin: Denies rash  Neurological: Denies headache, confusion, memory loss or focal weakness/parasthesias  Psychiatric: denies mood disorder   Endocrine: Whitney thyroid problems  Heme/Oncology/Lymph Nodes: Denies enlarged lymph nodes, denies brusing or known bleeding disorder  All other systems were reviewed and are negative (AMA/CMS criteria)                  PHYSICAL EXAMINATION:       Constitutional:   Well developed, Well nourished, No acute distress  HENMT:  Normocephalic, Atraumatic, Oropharynx moist mucous membranes, No oral exudates, Nose normal.  No thyromegaly.  Eyes: Visual deficit right eye  Neck:  Normal  range of motion, No cervical tenderness,  no JVD.  Cardiovascular:  Normal heart rate, Normal rhythm, No murmurs, No rubs, No gallops.   Extremitites with intact distal pulses, no cyanosis, or edema.  Lungs:  Normal breath sounds, breath sounds clear to auscultation bilaterally,  no crackles, no wheezing.   Abdomen: Bowel sounds normal, Soft, No tenderness, No guarding, No rebound, No masses, No hepatosplenomegaly.  Skin: Warm, Dry, No erythema, No rash, no induration.  Neurologic: Alert & oriented x 3, No focal deficits noted, cranial nerves II through X are grossly intact.  Psychiatric: Affect normal, Judgment normal, Mood normal.        LABORATORY VALUES:   Recent Labs      07/07/19   0325   WBC  4.7*   RBC  2.82*   HEMOGLOBIN  8.3*   HEMATOCRIT  26.5*   MCV  94.0   MCH  29.4   MCHC  31.3*   RDW  46.8   PLATELETCT  217   MPV  10.8     Recent Labs      07/07/19   0325   SODIUM  139   POTASSIUM  4.5   CHLORIDE  105   CO2  28   GLUCOSE  101*   BUN  12   CREATININE  1.19   CALCIUM  9.1                IMAGING:   US-CAROTID DOPPLER BILAT   Final Result      MR-MRA NECK-W/O   Final Result      1.  Nonvisualization of the right external carotid artery most consistent with occlusion.   2.  Suspect high-grade stenosis at the origin of the right cervical ICA despite misregistration artifact on the 2-D time-of-flight images. [facility] show weblike high-grade stenosis. Correlation with carotid duplex Doppler    ultrasound may be of interest.      MR-MRA HEAD-W/O   Final Result      1. MRA OF THE [place] OF NEAL WITHIN NORMAL LIMITS WITH NO EVIDENCE OF ANEURYSM OR CEREBROVASCULAR OCCLUSIVE DISEASE.      MR-BRAIN-W/O   Final Result      1.  Moderate cerebral atrophy. Age-appropriate.   2.  Mild supratentorial white matter disease most consistent with microvascular ischemic change. Common findings for the patient's age.   3.  No evidence of acute cerebral infarction, acute hemorrhage, or mass lesion.       OUTSIDE IMAGES-CT HEAD   Final Result          IMPRESSION AND PLAN:     Active Hospital Problems    Diagnosis   • Acute visual loss, right [H53.131]     Priority: High   • Carotid artery stenosis [I65.29]     Priority: High     April 2019: Carotid ultrasound with <50% stenosis bilaterally.  December 2016: Carotid ultrasound with <50% stenosis bilaterally.     • CAD (coronary artery disease) [I25.10]     Priority: Medium     2008: PCI/BMS x 2 to the LCx, mid (MiniVision 2.25 x 12mm) and proximal (MiniVision 2.0 x 12mm)  March 2016: MPI with no ischemia or infarct, LVEF 70%.     • COPD (chronic obstructive pulmonary disease) (HCC) [J44.9]     Priority: Medium   • HTN (hypertension) [I10]     Priority: Medium     Symptomatic right carotid artery stenosis with suspected right retinal artery embolus and resultant visual deficit.  Recommend adding Plavix to the patient's antiplatelet regimen  We will schedule patient for right carotid endarterectomy.  Risk benefits and alternatives discussed.  ____________________________________   Abel Yu M.D.          DD: 7/8/2019   DT: 9:30 AM

## 2019-07-08 NOTE — PROGRESS NOTES
[facility] Medicine Daily Progress Note    Date of Service  7/7/2019    Chief Complaint  86 y.o. female admitted 7/6/2019 with right eye vision loss.    Hospital Course    86-year-old female transferred from outlying facility with right eye vision loss.  CT scan without acute findings.  Suspected retinal artery occlusion.  Ophthalmology was consulted by outside hospital and transferred to [facility] for higher level care, neurology consultation.    Interval Problem Update    Reports right eye vision has slightly improve, remains blurry.   MRI brain without acute findings.  MRA limited by artifact.  Follow-up right carotid ultrasound revealed greater than 70% stenosis of internal carotid artery with 50 - 60% left internal carotid artery stenosis.     Consultants/Specialty    Dr. Scruggs, neurology   Plan vascular surgery consultation.    Code Status  Full     Disposition  Anticipate dc home when stable.     Review of Systems  Review of Systems   Constitutional: Negative for chills, diaphoresis and fever.   HENT: Negative for congestion.    Eyes: Positive for blurred vision (Right eye). Negative for discharge and redness.   Respiratory: Negative for cough, shortness of breath, wheezing and stridor.    Cardiovascular: Negative for chest pain, palpitations and leg swelling.   Gastrointestinal: Negative for abdominal pain, diarrhea and vomiting.   Genitourinary: Negative for flank pain and hematuria.   Musculoskeletal: Negative for back pain, joint pain and neck pain.   Neurological: Negative for tremors, sensory change, speech change, focal weakness and weakness.   Psychiatric/Behavioral: Negative for hallucinations and substance abuse.        Physical Exam  Temp:  [36 °C (96.8 °F)-36.8 °C (98.3 °F)] 36.8 °C (98.2 °F)  Pulse:  [62-75] 75  Resp:  [16] 16  BP: (144-178)/(47-64) 178/62  SpO2:  [91 %-100 %] 100 %    Physical Exam   Constitutional: She is oriented to person, place, and time. No distress.   HENT:   Head:  Normocephalic and atraumatic.   Eyes: EOM are normal. Right eye exhibits no discharge. Left eye exhibits no discharge. No scleral icterus.   Neck: Neck supple.   Cardiovascular: Normal rate and regular rhythm.    No murmur heard.  Pulmonary/Chest: No stridor. She has no wheezes. She has no rales.   Abdominal: Soft. She exhibits no distension. There is no tenderness.   Musculoskeletal: She exhibits no edema or tenderness.   Neurological: She is alert and oriented to person, place, and time. She exhibits normal muscle tone.   Mild anisocoria right pupil 4 mm left pupil 3 mm  No focal extremity weakness   Skin: Skin is warm and dry. She is not diaphoretic. No pallor.   Vitals reviewed.      Fluids    Intake/Output Summary (Last 24 hours) at 07/07/19 1801  Last data filed at 07/07/19 0600   Gross per 24 hour   Intake                0 ml   Output                0 ml   Net                0 ml       Laboratory  Recent Labs      07/07/19   0325   WBC  4.7*   RBC  2.82*   HEMOGLOBIN  8.3*   HEMATOCRIT  26.5*   MCV  94.0   MCH  29.4   MCHC  31.3*   RDW  46.8   PLATELETCT  217   MPV  10.8     Recent Labs      07/07/19   0325   SODIUM  139   POTASSIUM  4.5   CHLORIDE  105   CO2  28   GLUCOSE  101*   BUN  12   CREATININE  1.19   CALCIUM  9.1             Recent Labs      07/06/19   0433   TRIGLYCERIDE  71   HDL  53   LDL  58       Imaging  US-CAROTID DOPPLER BILAT         MR-MRA NECK-W/O   Final Result      1.  Nonvisualization of the right external carotid artery most consistent with occlusion.   2.  Suspect high-grade stenosis at the origin of the right cervical ICA despite misregistration artifact on the 2-D time-of-flight images. [facility] show weblike high-grade stenosis. Correlation with carotid duplex Doppler    ultrasound may be of interest.      MR-MRA HEAD-W/O   Final Result      1. MRA OF THE [place] OF NEAL WITHIN NORMAL LIMITS WITH NO EVIDENCE OF ANEURYSM OR CEREBROVASCULAR OCCLUSIVE  DISEASE.      MR-BRAIN-W/O   Final Result      1.  Moderate cerebral atrophy. Age-appropriate.   2.  Mild supratentorial white matter disease most consistent with microvascular ischemic change. Common findings for the patient's age.   3.  No evidence of acute cerebral infarction, acute hemorrhage, or mass lesion.      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   Patient was outside of the window for TPA.   Neurology initially  consulted.   Continue aspirin, statin therapy  Ultrasound with severe, greater than 70% right-sided ICA stenosis.  Discussed with vascular Dr. Abel Yu-with active symptoms and same side as vascular event- to  consult and evaluate for carotid endarterectomy.  Will start Plavix per Vascular recommendations.  Discussed with Dr. Scruggs, neurology .     Carotid artery stenosis- (present on admission)   Assessment & Plan    Bilateral with greater than 70% right ICA stenosis.  Plan vascular surgery eval as above.     HTN (hypertension)- (present on admission)   Assessment & Plan    Continue home lisinopril.     COPD (chronic obstructive pulmonary disease) (HCC)- (present on admission)   Assessment & Plan    No acute exacerbation of her chronic disease, continue home inhalers.     CAD (coronary artery disease)- (present on admission)   Assessment & Plan    Continue statin , Asa therapy           VTE prophylaxis: SCDs

## 2019-07-09 ENCOUNTER — ANESTHESIA EVENT (OUTPATIENT)
Dept: SURGERY | Facility: MEDICAL CENTER | Age: 84
DRG: 983 | End: 2019-07-09
Payer: MEDICARE

## 2019-07-09 ENCOUNTER — ANESTHESIA (OUTPATIENT)
Dept: SURGERY | Facility: MEDICAL CENTER | Age: 84
DRG: 983 | End: 2019-07-09
Payer: MEDICARE

## 2019-07-09 LAB
ANION GAP SERPL CALC-SCNC: 8 MMOL/L (ref 0–11.9)
BASOPHILS # BLD AUTO: 0.9 % (ref 0–1.8)
BASOPHILS # BLD: 0.04 K/UL (ref 0–0.12)
BUN SERPL-MCNC: 16 MG/DL (ref 8–22)
CALCIUM SERPL-MCNC: 9.2 MG/DL (ref 8.5–10.5)
CHLORIDE SERPL-SCNC: 104 MMOL/L (ref 96–112)
CO2 SERPL-SCNC: 28 MMOL/L (ref 20–33)
CREAT SERPL-MCNC: 1.11 MG/DL (ref 0.5–1.4)
EKG IMPRESSION: NORMAL
EOSINOPHIL # BLD AUTO: 0.3 K/UL (ref 0–0.51)
EOSINOPHIL NFR BLD: 6.5 % (ref 0–6.9)
ERYTHROCYTE [DISTWIDTH] IN BLOOD BY AUTOMATED COUNT: 47.1 FL (ref 35.9–50)
GLUCOSE SERPL-MCNC: 92 MG/DL (ref 65–99)
HCT VFR BLD AUTO: 27.7 % (ref 37–47)
HGB BLD-MCNC: 8.6 G/DL (ref 12–16)
IMM GRANULOCYTES # BLD AUTO: 0.01 K/UL (ref 0–0.11)
IMM GRANULOCYTES NFR BLD AUTO: 0.2 % (ref 0–0.9)
LYMPHOCYTES # BLD AUTO: 1.09 K/UL (ref 1–4.8)
LYMPHOCYTES NFR BLD: 23.5 % (ref 22–41)
MCH RBC QN AUTO: 29.1 PG (ref 27–33)
MCHC RBC AUTO-ENTMCNC: 31 G/DL (ref 33.6–35)
MCV RBC AUTO: 93.6 FL (ref 81.4–97.8)
MONOCYTES # BLD AUTO: 0.56 K/UL (ref 0–0.85)
MONOCYTES NFR BLD AUTO: 12.1 % (ref 0–13.4)
NEUTROPHILS # BLD AUTO: 2.63 K/UL (ref 2–7.15)
NEUTROPHILS NFR BLD: 56.8 % (ref 44–72)
NRBC # BLD AUTO: 0 K/UL
NRBC BLD-RTO: 0 /100 WBC
PLATELET # BLD AUTO: 216 K/UL (ref 164–446)
PMV BLD AUTO: 10.9 FL (ref 9–12.9)
POTASSIUM SERPL-SCNC: 4.2 MMOL/L (ref 3.6–5.5)
RBC # BLD AUTO: 2.96 M/UL (ref 4.2–5.4)
SODIUM SERPL-SCNC: 140 MMOL/L (ref 135–145)
WBC # BLD AUTO: 4.6 K/UL (ref 4.8–10.8)

## 2019-07-09 PROCEDURE — A9270 NON-COVERED ITEM OR SERVICE: HCPCS | Performed by: HOSPITALIST

## 2019-07-09 PROCEDURE — 110372 HCHG SHELL REV 278: Performed by: SURGERY

## 2019-07-09 PROCEDURE — 03HY32Z INSERTION OF MONITORING DEVICE INTO UPPER ARTERY, PERCUTANEOUS APPROACH: ICD-10-PCS | Performed by: ANESTHESIOLOGY

## 2019-07-09 PROCEDURE — 700101 HCHG RX REV CODE 250: Performed by: ANESTHESIOLOGY

## 2019-07-09 PROCEDURE — 160041 HCHG SURGERY MINUTES - EA ADDL 1 MIN LEVEL 4: Performed by: SURGERY

## 2019-07-09 PROCEDURE — 500389 HCHG DRAIN, RESERVOIR SUCT JP 100CC: Performed by: SURGERY

## 2019-07-09 PROCEDURE — 700102 HCHG RX REV CODE 250 W/ 637 OVERRIDE(OP): Performed by: INTERNAL MEDICINE

## 2019-07-09 PROCEDURE — 700105 HCHG RX REV CODE 258: Performed by: ANESTHESIOLOGY

## 2019-07-09 PROCEDURE — 160035 HCHG PACU - 1ST 60 MINS PHASE I: Performed by: SURGERY

## 2019-07-09 PROCEDURE — 03UK0KZ SUPPLEMENT RIGHT INTERNAL CAROTID ARTERY WITH NONAUTOLOGOUS TISSUE SUBSTITUTE, OPEN APPROACH: ICD-10-PCS | Performed by: SURGERY

## 2019-07-09 PROCEDURE — 160002 HCHG RECOVERY MINUTES (STAT): Performed by: SURGERY

## 2019-07-09 PROCEDURE — 85025 COMPLETE CBC W/AUTO DIFF WBC: CPT

## 2019-07-09 PROCEDURE — 700102 HCHG RX REV CODE 250 W/ 637 OVERRIDE(OP): Performed by: SURGERY

## 2019-07-09 PROCEDURE — 160048 HCHG OR STATISTICAL LEVEL 1-5: Performed by: SURGERY

## 2019-07-09 PROCEDURE — 501837 HCHG SUTURE CV: Performed by: SURGERY

## 2019-07-09 PROCEDURE — 96372 THER/PROPH/DIAG INJ SC/IM: CPT

## 2019-07-09 PROCEDURE — 700101 HCHG RX REV CODE 250: Performed by: SURGERY

## 2019-07-09 PROCEDURE — 501445 HCHG STAPLER, SKIN DISP: Performed by: SURGERY

## 2019-07-09 PROCEDURE — 700111 HCHG RX REV CODE 636 W/ 250 OVERRIDE (IP): Performed by: ANESTHESIOLOGY

## 2019-07-09 PROCEDURE — 160009 HCHG ANES TIME/MIN: Performed by: SURGERY

## 2019-07-09 PROCEDURE — 160036 HCHG PACU - EA ADDL 30 MINS PHASE I: Performed by: SURGERY

## 2019-07-09 PROCEDURE — 93005 ELECTROCARDIOGRAM TRACING: CPT | Performed by: SURGERY

## 2019-07-09 PROCEDURE — 700111 HCHG RX REV CODE 636 W/ 250 OVERRIDE (IP): Performed by: SURGERY

## 2019-07-09 PROCEDURE — G0378 HOSPITAL OBSERVATION PER HR: HCPCS

## 2019-07-09 PROCEDURE — 700102 HCHG RX REV CODE 250 W/ 637 OVERRIDE(OP): Performed by: PSYCHIATRY & NEUROLOGY

## 2019-07-09 PROCEDURE — 700102 HCHG RX REV CODE 250 W/ 637 OVERRIDE(OP): Performed by: HOSPITALIST

## 2019-07-09 PROCEDURE — A9270 NON-COVERED ITEM OR SERVICE: HCPCS | Performed by: INTERNAL MEDICINE

## 2019-07-09 PROCEDURE — 160029 HCHG SURGERY MINUTES - 1ST 30 MINS LEVEL 4: Performed by: SURGERY

## 2019-07-09 PROCEDURE — A9270 NON-COVERED ITEM OR SERVICE: HCPCS | Performed by: SURGERY

## 2019-07-09 PROCEDURE — 500257: Performed by: SURGERY

## 2019-07-09 PROCEDURE — 36415 COLL VENOUS BLD VENIPUNCTURE: CPT

## 2019-07-09 PROCEDURE — A9270 NON-COVERED ITEM OR SERVICE: HCPCS | Performed by: PSYCHIATRY & NEUROLOGY

## 2019-07-09 PROCEDURE — 99225 PR SUBSEQUENT OBSERVATION CARE,LEVEL II: CPT | Performed by: INTERNAL MEDICINE

## 2019-07-09 PROCEDURE — 700105 HCHG RX REV CODE 258: Performed by: SURGERY

## 2019-07-09 PROCEDURE — C1768 GRAFT, VASCULAR: HCPCS | Performed by: SURGERY

## 2019-07-09 PROCEDURE — 80048 BASIC METABOLIC PNL TOTAL CA: CPT

## 2019-07-09 PROCEDURE — 93010 ELECTROCARDIOGRAM REPORT: CPT | Performed by: INTERNAL MEDICINE

## 2019-07-09 PROCEDURE — 501838 HCHG SUTURE GENERAL: Performed by: SURGERY

## 2019-07-09 PROCEDURE — 03CK0ZZ EXTIRPATION OF MATTER FROM RIGHT INTERNAL CAROTID ARTERY, OPEN APPROACH: ICD-10-PCS | Performed by: SURGERY

## 2019-07-09 PROCEDURE — 500368 HCHG DRAIN, 7MM FLAT-FLUTED: Performed by: SURGERY

## 2019-07-09 DEVICE — PATCH .8X8CM XENOSURE BIOLOGIC VASCULAR---ORDER IN MULTIPLES OF 5---: Type: IMPLANTABLE DEVICE | Site: NECK | Status: FUNCTIONAL

## 2019-07-09 RX ORDER — CEFAZOLIN SODIUM 1 G/3ML
INJECTION, POWDER, FOR SOLUTION INTRAMUSCULAR; INTRAVENOUS PRN
Status: DISCONTINUED | OUTPATIENT
Start: 2019-07-09 | End: 2019-07-09 | Stop reason: SURG

## 2019-07-09 RX ORDER — PHENYLEPHRINE HYDROCHLORIDE 10 MG/ML
INJECTION, SOLUTION INTRAMUSCULAR; INTRAVENOUS; SUBCUTANEOUS PRN
Status: DISCONTINUED | OUTPATIENT
Start: 2019-07-09 | End: 2019-07-09 | Stop reason: SURG

## 2019-07-09 RX ORDER — DEXAMETHASONE SODIUM PHOSPHATE 4 MG/ML
INJECTION, SOLUTION INTRA-ARTICULAR; INTRALESIONAL; INTRAMUSCULAR; INTRAVENOUS; SOFT TISSUE PRN
Status: DISCONTINUED | OUTPATIENT
Start: 2019-07-09 | End: 2019-07-09 | Stop reason: SURG

## 2019-07-09 RX ORDER — OXYCODONE HCL 5 MG/5 ML
5 SOLUTION, ORAL ORAL
Status: DISCONTINUED | OUTPATIENT
Start: 2019-07-09 | End: 2019-07-09 | Stop reason: HOSPADM

## 2019-07-09 RX ORDER — DEXMEDETOMIDINE HYDROCHLORIDE 100 UG/ML
INJECTION, SOLUTION INTRAVENOUS PRN
Status: DISCONTINUED | OUTPATIENT
Start: 2019-07-09 | End: 2019-07-09 | Stop reason: SURG

## 2019-07-09 RX ORDER — DEXTROSE MONOHYDRATE, SODIUM CHLORIDE, AND POTASSIUM CHLORIDE 50; 1.49; 4.5 G/1000ML; G/1000ML; G/1000ML
INJECTION, SOLUTION INTRAVENOUS CONTINUOUS
Status: DISCONTINUED | OUTPATIENT
Start: 2019-07-09 | End: 2019-07-10

## 2019-07-09 RX ORDER — FLUTICASONE PROPIONATE 50 MCG
2 SPRAY, SUSPENSION (ML) NASAL DAILY
Status: DISCONTINUED | OUTPATIENT
Start: 2019-07-09 | End: 2019-07-10 | Stop reason: HOSPADM

## 2019-07-09 RX ORDER — PROTAMINE SULFATE 10 MG/ML
INJECTION, SOLUTION INTRAVENOUS PRN
Status: DISCONTINUED | OUTPATIENT
Start: 2019-07-09 | End: 2019-07-09 | Stop reason: SURG

## 2019-07-09 RX ORDER — LABETALOL HYDROCHLORIDE 5 MG/ML
5 INJECTION, SOLUTION INTRAVENOUS
Status: DISCONTINUED | OUTPATIENT
Start: 2019-07-09 | End: 2019-07-09 | Stop reason: HOSPADM

## 2019-07-09 RX ORDER — IPRATROPIUM BROMIDE AND ALBUTEROL SULFATE 2.5; .5 MG/3ML; MG/3ML
3 SOLUTION RESPIRATORY (INHALATION)
Status: DISCONTINUED | OUTPATIENT
Start: 2019-07-09 | End: 2019-07-09 | Stop reason: HOSPADM

## 2019-07-09 RX ORDER — SODIUM CHLORIDE, SODIUM LACTATE, POTASSIUM CHLORIDE, CALCIUM CHLORIDE 600; 310; 30; 20 MG/100ML; MG/100ML; MG/100ML; MG/100ML
INJECTION, SOLUTION INTRAVENOUS
Status: DISCONTINUED | OUTPATIENT
Start: 2019-07-09 | End: 2019-07-09 | Stop reason: SURG

## 2019-07-09 RX ORDER — CALCIUM CARBONATE 500 MG/1
500 TABLET, CHEWABLE ORAL
Status: DISCONTINUED | OUTPATIENT
Start: 2019-07-09 | End: 2019-07-10 | Stop reason: HOSPADM

## 2019-07-09 RX ORDER — OXYCODONE HCL 5 MG/5 ML
10 SOLUTION, ORAL ORAL
Status: DISCONTINUED | OUTPATIENT
Start: 2019-07-09 | End: 2019-07-09 | Stop reason: HOSPADM

## 2019-07-09 RX ORDER — DIPHENHYDRAMINE HYDROCHLORIDE 50 MG/ML
25 INJECTION INTRAMUSCULAR; INTRAVENOUS EVERY 6 HOURS PRN
Status: DISCONTINUED | OUTPATIENT
Start: 2019-07-09 | End: 2019-07-10 | Stop reason: HOSPADM

## 2019-07-09 RX ORDER — BUPIVACAINE HYDROCHLORIDE AND EPINEPHRINE 5; 5 MG/ML; UG/ML
INJECTION, SOLUTION EPIDURAL; INTRACAUDAL; PERINEURAL
Status: DISCONTINUED | OUTPATIENT
Start: 2019-07-09 | End: 2019-07-09 | Stop reason: HOSPADM

## 2019-07-09 RX ORDER — ONDANSETRON 2 MG/ML
INJECTION INTRAMUSCULAR; INTRAVENOUS PRN
Status: DISCONTINUED | OUTPATIENT
Start: 2019-07-09 | End: 2019-07-09 | Stop reason: SURG

## 2019-07-09 RX ORDER — ACETAMINOPHEN 10 MG/ML
1000 INJECTION, SOLUTION INTRAVENOUS ONCE
Status: COMPLETED | OUTPATIENT
Start: 2019-07-09 | End: 2019-07-09

## 2019-07-09 RX ORDER — ACETAMINOPHEN 325 MG/1
650 TABLET ORAL EVERY 6 HOURS
Status: DISCONTINUED | OUTPATIENT
Start: 2019-07-10 | End: 2019-07-10 | Stop reason: HOSPADM

## 2019-07-09 RX ORDER — SCOLOPAMINE TRANSDERMAL SYSTEM 1 MG/1
1 PATCH, EXTENDED RELEASE TRANSDERMAL
Status: DISCONTINUED | OUTPATIENT
Start: 2019-07-09 | End: 2019-07-10 | Stop reason: HOSPADM

## 2019-07-09 RX ORDER — OXYCODONE HYDROCHLORIDE 5 MG/1
2.5 TABLET ORAL
Status: DISCONTINUED | OUTPATIENT
Start: 2019-07-09 | End: 2019-07-10 | Stop reason: HOSPADM

## 2019-07-09 RX ORDER — SODIUM CHLORIDE, SODIUM GLUCONATE, SODIUM ACETATE, POTASSIUM CHLORIDE AND MAGNESIUM CHLORIDE 526; 502; 368; 37; 30 MG/100ML; MG/100ML; MG/100ML; MG/100ML; MG/100ML
INJECTION, SOLUTION INTRAVENOUS
Status: DISCONTINUED | OUTPATIENT
Start: 2019-07-09 | End: 2019-07-09 | Stop reason: SURG

## 2019-07-09 RX ORDER — SODIUM CHLORIDE, SODIUM LACTATE, POTASSIUM CHLORIDE, CALCIUM CHLORIDE 600; 310; 30; 20 MG/100ML; MG/100ML; MG/100ML; MG/100ML
INJECTION, SOLUTION INTRAVENOUS CONTINUOUS
Status: DISCONTINUED | OUTPATIENT
Start: 2019-07-09 | End: 2019-07-09 | Stop reason: HOSPADM

## 2019-07-09 RX ORDER — HEPARIN SODIUM,PORCINE 1000/ML
VIAL (ML) INJECTION PRN
Status: DISCONTINUED | OUTPATIENT
Start: 2019-07-09 | End: 2019-07-09 | Stop reason: SURG

## 2019-07-09 RX ORDER — OXYCODONE HYDROCHLORIDE 5 MG/1
5 TABLET ORAL
Status: DISCONTINUED | OUTPATIENT
Start: 2019-07-09 | End: 2019-07-10 | Stop reason: HOSPADM

## 2019-07-09 RX ORDER — HALOPERIDOL 5 MG/ML
1 INJECTION INTRAMUSCULAR
Status: DISCONTINUED | OUTPATIENT
Start: 2019-07-09 | End: 2019-07-09 | Stop reason: HOSPADM

## 2019-07-09 RX ORDER — NALOXONE HYDROCHLORIDE 0.4 MG/ML
INJECTION, SOLUTION INTRAMUSCULAR; INTRAVENOUS; SUBCUTANEOUS PRN
Status: DISCONTINUED | OUTPATIENT
Start: 2019-07-09 | End: 2019-07-09 | Stop reason: SURG

## 2019-07-09 RX ORDER — MAGNESIUM SULFATE HEPTAHYDRATE 40 MG/ML
INJECTION, SOLUTION INTRAVENOUS PRN
Status: DISCONTINUED | OUTPATIENT
Start: 2019-07-09 | End: 2019-07-09 | Stop reason: SURG

## 2019-07-09 RX ORDER — ONDANSETRON 2 MG/ML
4 INJECTION INTRAMUSCULAR; INTRAVENOUS EVERY 4 HOURS PRN
Status: DISCONTINUED | OUTPATIENT
Start: 2019-07-09 | End: 2019-07-10 | Stop reason: HOSPADM

## 2019-07-09 RX ORDER — DEXAMETHASONE SODIUM PHOSPHATE 4 MG/ML
4 INJECTION, SOLUTION INTRA-ARTICULAR; INTRALESIONAL; INTRAMUSCULAR; INTRAVENOUS; SOFT TISSUE
Status: DISCONTINUED | OUTPATIENT
Start: 2019-07-09 | End: 2019-07-10 | Stop reason: HOSPADM

## 2019-07-09 RX ORDER — ONDANSETRON 2 MG/ML
4 INJECTION INTRAMUSCULAR; INTRAVENOUS
Status: DISCONTINUED | OUTPATIENT
Start: 2019-07-09 | End: 2019-07-09 | Stop reason: HOSPADM

## 2019-07-09 RX ADMIN — CLOPIDOGREL BISULFATE 75 MG: 75 TABLET ORAL at 06:03

## 2019-07-09 RX ADMIN — DEXMEDETOMIDINE HYDROCHLORIDE 20 MCG: 100 INJECTION, SOLUTION INTRAVENOUS at 17:42

## 2019-07-09 RX ADMIN — SODIUM CHLORIDE, POTASSIUM CHLORIDE, SODIUM LACTATE AND CALCIUM CHLORIDE: 600; 310; 30; 20 INJECTION, SOLUTION INTRAVENOUS at 19:59

## 2019-07-09 RX ADMIN — HEPARIN SODIUM 5000 UNITS: 1000 INJECTION, SOLUTION INTRAVENOUS; SUBCUTANEOUS at 18:14

## 2019-07-09 RX ADMIN — SODIUM CHLORIDE, POTASSIUM CHLORIDE, SODIUM LACTATE AND CALCIUM CHLORIDE: 600; 310; 30; 20 INJECTION, SOLUTION INTRAVENOUS at 17:38

## 2019-07-09 RX ADMIN — ALPRAZOLAM 0.5 MG: 0.5 TABLET ORAL at 23:14

## 2019-07-09 RX ADMIN — FENTANYL CITRATE 50 MCG: 50 INJECTION, SOLUTION INTRAMUSCULAR; INTRAVENOUS at 17:35

## 2019-07-09 RX ADMIN — ALBUTEROL SULFATE 2 PUFF: 90 AEROSOL, METERED RESPIRATORY (INHALATION) at 13:41

## 2019-07-09 RX ADMIN — PHENYLEPHRINE HYDROCHLORIDE 50 MCG: 10 INJECTION INTRAVENOUS at 18:10

## 2019-07-09 RX ADMIN — BUDESONIDE AND FORMOTEROL FUMARATE DIHYDRATE 2 PUFF: 160; 4.5 AEROSOL RESPIRATORY (INHALATION) at 06:04

## 2019-07-09 RX ADMIN — PROTAMINE SULFATE 40 MG: 10 INJECTION, SOLUTION INTRAVENOUS at 18:48

## 2019-07-09 RX ADMIN — MAGNESIUM SULFATE IN WATER 2 G: 40 INJECTION, SOLUTION INTRAVENOUS at 18:00

## 2019-07-09 RX ADMIN — CEFAZOLIN 1 G: 330 INJECTION, POWDER, FOR SOLUTION INTRAMUSCULAR; INTRAVENOUS at 17:38

## 2019-07-09 RX ADMIN — LISINOPRIL 40 MG: 20 TABLET ORAL at 06:03

## 2019-07-09 RX ADMIN — PROPOFOL 100 MG: 10 INJECTION, EMULSION INTRAVENOUS at 17:42

## 2019-07-09 RX ADMIN — EPHEDRINE SULFATE 10 MG: 50 INJECTION INTRAMUSCULAR; INTRAVENOUS; SUBCUTANEOUS at 18:10

## 2019-07-09 RX ADMIN — FLUTICASONE PROPIONATE 100 MCG: 50 SPRAY, METERED NASAL at 13:41

## 2019-07-09 RX ADMIN — PHENYLEPHRINE HYDROCHLORIDE 100 MCG: 10 INJECTION INTRAVENOUS at 17:55

## 2019-07-09 RX ADMIN — ONDANSETRON 4 MG: 2 INJECTION INTRAMUSCULAR; INTRAVENOUS at 19:00

## 2019-07-09 RX ADMIN — PHENYLEPHRINE HYDROCHLORIDE 50 MCG: 10 INJECTION INTRAVENOUS at 18:45

## 2019-07-09 RX ADMIN — PROPOFOL 20 MG: 10 INJECTION, EMULSION INTRAVENOUS at 18:00

## 2019-07-09 RX ADMIN — PHENYLEPHRINE HYDROCHLORIDE 50 MCG: 10 INJECTION INTRAVENOUS at 18:28

## 2019-07-09 RX ADMIN — FENTANYL CITRATE 50 MCG: 50 INJECTION, SOLUTION INTRAMUSCULAR; INTRAVENOUS at 17:42

## 2019-07-09 RX ADMIN — ASPIRIN 81 MG: 81 TABLET ORAL at 06:03

## 2019-07-09 RX ADMIN — NALOXONE HYDROCHLORIDE 80 MCG: 0.4 INJECTION, SOLUTION INTRAMUSCULAR; INTRAVENOUS; SUBCUTANEOUS at 19:10

## 2019-07-09 RX ADMIN — ALPRAZOLAM 0.25 MG: 0.25 TABLET ORAL at 06:02

## 2019-07-09 RX ADMIN — EPHEDRINE SULFATE 5 MG: 50 INJECTION INTRAMUSCULAR; INTRAVENOUS; SUBCUTANEOUS at 18:45

## 2019-07-09 RX ADMIN — PHENYLEPHRINE HYDROCHLORIDE 50 MCG: 10 INJECTION INTRAVENOUS at 18:20

## 2019-07-09 RX ADMIN — POTASSIUM CHLORIDE, DEXTROSE MONOHYDRATE AND SODIUM CHLORIDE: 150; 5; 450 INJECTION, SOLUTION INTRAVENOUS at 22:49

## 2019-07-09 RX ADMIN — DEXAMETHASONE SODIUM PHOSPHATE 8 MG: 4 INJECTION, SOLUTION INTRA-ARTICULAR; INTRALESIONAL; INTRAMUSCULAR; INTRAVENOUS; SOFT TISSUE at 17:55

## 2019-07-09 RX ADMIN — SODIUM CHLORIDE, SODIUM GLUCONATE, SODIUM ACETATE, POTASSIUM CHLORIDE AND MAGNESIUM CHLORIDE: 526; 502; 368; 37; 30 INJECTION, SOLUTION INTRAVENOUS at 19:00

## 2019-07-09 RX ADMIN — ROCURONIUM BROMIDE 40 MG: 10 INJECTION, SOLUTION INTRAVENOUS at 17:42

## 2019-07-09 RX ADMIN — ACETAMINOPHEN 1000 MG: 10 INJECTION, SOLUTION INTRAVENOUS at 20:00

## 2019-07-09 RX ADMIN — ACETAMINOPHEN 650 MG: 325 TABLET, FILM COATED ORAL at 23:14

## 2019-07-09 RX ADMIN — ENOXAPARIN SODIUM 40 MG: 100 INJECTION SUBCUTANEOUS at 22:49

## 2019-07-09 RX ADMIN — SUGAMMADEX 200 MG: 100 INJECTION, SOLUTION INTRAVENOUS at 19:00

## 2019-07-09 RX ADMIN — ALBUTEROL SULFATE 2 PUFF: 90 AEROSOL, METERED RESPIRATORY (INHALATION) at 22:50

## 2019-07-09 ASSESSMENT — ENCOUNTER SYMPTOMS
TREMORS: 0
HALLUCINATIONS: 0
TINGLING: 0
SPEECH CHANGE: 0
CONSTIPATION: 0
HEADACHES: 0
FOCAL WEAKNESS: 0
FEVER: 0
BACK PAIN: 0
DIZZINESS: 0
ABDOMINAL PAIN: 0
MYALGIAS: 0
BLURRED VISION: 1
NECK PAIN: 0
DOUBLE VISION: 0
SPUTUM PRODUCTION: 0
COUGH: 0
EYE PAIN: 0
ORTHOPNEA: 0
NAUSEA: 0
SHORTNESS OF BREATH: 0
WEIGHT LOSS: 0
PHOTOPHOBIA: 0
SENSORY CHANGE: 0
CHILLS: 0
VOMITING: 0
PALPITATIONS: 0
DIARRHEA: 0

## 2019-07-09 ASSESSMENT — PAIN SCALES - GENERAL: PAIN_LEVEL: 2

## 2019-07-09 ASSESSMENT — LIFESTYLE VARIABLES: SUBSTANCE_ABUSE: 0

## 2019-07-09 NOTE — PROGRESS NOTES
Assumed care of pt at 0700.   Pt is A&Ox4.  Pt denies n/v, n/t, and pain at this time.   Pt with R eye vision impairment.   Pt up self to bathroom with steady gait.   NPO at 0900 for surgery this afternoon.   Pt is on 4L O2 via NC, which is her baseline.   Plan of care discussed.   Hourly rounding in place.

## 2019-07-09 NOTE — PROGRESS NOTES
Hospital Medicine Daily Progress Note    Date of Service  7/8/2019    Chief Complaint  86 y.o. female admitted 7/6/2019 with right eye vision loss.    Hospital Course    86-year-old female transferred from outlying facility with right eye vision loss.  CT scan without acute findings.  Suspected retinal artery occlusion.  Ophthalmology was consulted by outside hospital and transferred to [facility] for higher level care, neurology consultation.    Interval Problem Update    No new complaints. Right eye remains blurry.  I discussed case with Dr. Yu vascular-plan to proceed with carotid artery endarterectomy.     Consultants/Specialty    Dr. Scruggs, neurology   Vascular surgery    Code Status  Full     Disposition  Anticipate dc home when stable.     Review of Systems  Review of Systems   Constitutional: Negative for chills, diaphoresis and fever.   Eyes: Positive for blurred vision (Right eye). Negative for discharge and redness.   Respiratory: Negative for cough, shortness of breath and wheezing.    Cardiovascular: Negative for chest pain and palpitations.   Gastrointestinal: Negative for abdominal pain, diarrhea and vomiting.   Musculoskeletal: Negative for back pain, joint pain and neck pain.   Neurological: Negative for tremors, sensory change, speech change, focal weakness and weakness.        Physical Exam  Temp:  [36.1 °C (97 °F)-37 °C (98.6 °F)] 36.7 °C (98.1 °F)  Pulse:  [62-75] 74  Resp:  [16] 16  BP: (159-183)/(45-69) 183/69  SpO2:  [94 %-100 %] 100 %    Physical Exam   Constitutional: She is oriented to person, place, and time. No distress.   HENT:   Head: Normocephalic and atraumatic.   Eyes: EOM are normal. Right eye exhibits no discharge. Left eye exhibits no discharge. No scleral icterus.   Neck: Neck supple.   Cardiovascular: Normal rate and regular rhythm.    No murmur heard.  Pulmonary/Chest: No stridor. She has no wheezes. She has no rales.   Abdominal: Soft. She exhibits no distension. There is  no tenderness.   Musculoskeletal: She exhibits no edema or tenderness.   Neurological: She is alert and oriented to person, place, and time. She exhibits normal muscle tone.   Mild anisocoria right pupil 4 mm left pupil 3 mm  No focal extremity weakness     Skin: Skin is warm and dry. She is not diaphoretic. No pallor.   Psychiatric: She has a normal mood and affect. Her behavior is normal.   Vitals reviewed.      Fluids    Intake/Output Summary (Last 24 hours) at 07/08/19 1810  Last data filed at 07/08/19 0600   Gross per 24 hour   Intake              360 ml   Output                0 ml   Net              360 ml       Laboratory  Recent Labs      07/07/19   0325   WBC  4.7*   RBC  2.82*   HEMOGLOBIN  8.3*   HEMATOCRIT  26.5*   MCV  94.0   MCH  29.4   MCHC  31.3*   RDW  46.8   PLATELETCT  217   MPV  10.8     Recent Labs      07/07/19   0325   SODIUM  139   POTASSIUM  4.5   CHLORIDE  105   CO2  28   GLUCOSE  101*   BUN  12   CREATININE  1.19   CALCIUM  9.1             Recent Labs      07/06/19   0433   TRIGLYCERIDE  71   HDL  53   LDL  58       Imaging  US-CAROTID DOPPLER BILAT   Final Result      MR-MRA NECK-W/O   Final Result      1.  Nonvisualization of the right external carotid artery most consistent with occlusion.   2.  Suspect high-grade stenosis at the origin of the right cervical ICA despite misregistration artifact on the 2-D time-of-flight images. The [facility] 3-D time-of-flight images show weblike high-grade stenosis. Correlation with carotid duplex Doppler    ultrasound may be of interest.      MR-MRA HEAD-W/O   Final Result      1. MRA OF THE [place] OF NEAL WITHIN NORMAL LIMITS WITH NO EVIDENCE OF ANEURYSM OR CEREBROVASCULAR OCCLUSIVE DISEASE.      MR-BRAIN-W/O   Final Result      1.  Moderate cerebral atrophy. Age-appropriate.   2.  Mild supratentorial white matter disease most consistent with microvascular ischemic change. Common findings for the patient's age.   3.  No evidence of acute cerebral  infarction, acute hemorrhage, or mass lesion.      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   Patient was outside of the window for TPA.   Neurology initially  consulted.   Continue aspirin, statin therapy  Ultrasound with severe, greater than 70% right-sided ICA stenosis.  Discussed with vascular Dr. Abel Yu-with active symptoms and same side as vascular event-plan carotid endarterectomy.    Continue aspirin, Plavix, statin   Neuro input     Carotid artery stenosis- (present on admission)   Assessment & Plan    Bilateral with greater than 70% right ICA stenosis.  Plan vascular surgery eval as above.  Plavix added     HTN (hypertension)- (present on admission)   Assessment & Plan    Continue home lisinopril.     COPD (chronic obstructive pulmonary disease) (HCC)- (present on admission)   Assessment & Plan    No acute exacerbation of her chronic disease, continue home inhalers.     CAD (coronary artery disease)- (present on admission)   Assessment & Plan    Continue statin , antiplatelet therapies     Anemia- (present on admission)   Assessment & Plan    Chronic without symptoms of bleed  Check iron, B12, thyroid fxn  Follow-up hgb  Transfuse if hemoglobin less than 7            VTE prophylaxis: SCDs

## 2019-07-09 NOTE — CARE PLAN
Problem: Safety  Goal: Will remain free from falls  Outcome: PROGRESSING AS EXPECTED  Treaded socks in place. Bed locked and in lowest position. Call light within reach.     Problem: Venous Thromboembolism (VTW)/Deep Vein Thrombosis (DVT) Prevention:  Goal: Patient will participate in Venous Thrombosis (VTE)/Deep Vein Thrombosis (DVT)Prevention Measures  Outcome: PROGRESSING AS EXPECTED  Pt ambulating to bathroom with steady gait.

## 2019-07-09 NOTE — CARE PLAN
Problem: Communication  Goal: The ability to communicate needs accurately and effectively will improve  Outcome: PROGRESSING AS EXPECTED  Discussed plan of care, pt expressed verbal understanding. Questions/ concerns encouraged to be voiced.     Problem: Safety  Goal: Will remain free from injury  Outcome: PROGRESSING AS EXPECTED  Pt ambulates with steady gait - fall precautions in place - call light within reach, nonslip footwear on, bed locked/ placed in lowest position, side rails up x2, hourly rounding.

## 2019-07-09 NOTE — ASSESSMENT & PLAN NOTE
Chronic without signs of active bleeding  Iron panel, B12 and TSH within normal limits  She found to have hemoglobin of 7.4 and I ordered repeat H&H at 11 AM.  Transfuse if hemoglobin less than 7 or she becomes symptomatic.

## 2019-07-10 VITALS
TEMPERATURE: 97 F | WEIGHT: 105 LBS | SYSTOLIC BLOOD PRESSURE: 130 MMHG | HEIGHT: 62 IN | DIASTOLIC BLOOD PRESSURE: 50 MMHG | BODY MASS INDEX: 19.32 KG/M2 | HEART RATE: 69 BPM | RESPIRATION RATE: 19 BRPM | OXYGEN SATURATION: 100 %

## 2019-07-10 PROBLEM — H53.131 ACUTE VISUAL LOSS, RIGHT: Status: RESOLVED | Noted: 2019-07-06 | Resolved: 2019-07-10

## 2019-07-10 PROBLEM — I65.29 CAROTID ARTERY STENOSIS: Status: RESOLVED | Noted: 2017-12-06 | Resolved: 2019-07-10

## 2019-07-10 LAB
ALBUMIN SERPL BCP-MCNC: 3.3 G/DL (ref 3.2–4.9)
ALBUMIN/GLOB SERPL: 1.7 G/DL
ALP SERPL-CCNC: 54 U/L (ref 30–99)
ALT SERPL-CCNC: 6 U/L (ref 2–50)
ANION GAP SERPL CALC-SCNC: 8 MMOL/L (ref 0–11.9)
AST SERPL-CCNC: 15 U/L (ref 12–45)
BILIRUB SERPL-MCNC: 0.4 MG/DL (ref 0.1–1.5)
BUN SERPL-MCNC: 14 MG/DL (ref 8–22)
CALCIUM SERPL-MCNC: 8.9 MG/DL (ref 8.5–10.5)
CHLORIDE SERPL-SCNC: 102 MMOL/L (ref 96–112)
CO2 SERPL-SCNC: 26 MMOL/L (ref 20–33)
CREAT SERPL-MCNC: 1.02 MG/DL (ref 0.5–1.4)
EKG IMPRESSION: NORMAL
ERYTHROCYTE [DISTWIDTH] IN BLOOD BY AUTOMATED COUNT: 47.3 FL (ref 35.9–50)
GLOBULIN SER CALC-MCNC: 2 G/DL (ref 1.9–3.5)
GLUCOSE SERPL-MCNC: 167 MG/DL (ref 65–99)
HCT VFR BLD AUTO: 24.4 % (ref 37–47)
HCT VFR BLD AUTO: 25.6 % (ref 37–47)
HGB BLD-MCNC: 7.4 G/DL (ref 12–16)
HGB BLD-MCNC: 7.9 G/DL (ref 12–16)
MAGNESIUM SERPL-MCNC: 2.7 MG/DL (ref 1.5–2.5)
MCH RBC QN AUTO: 28.5 PG (ref 27–33)
MCHC RBC AUTO-ENTMCNC: 30.3 G/DL (ref 33.6–35)
MCV RBC AUTO: 93.8 FL (ref 81.4–97.8)
PLATELET # BLD AUTO: 213 K/UL (ref 164–446)
PMV BLD AUTO: 11.1 FL (ref 9–12.9)
POTASSIUM SERPL-SCNC: 4.4 MMOL/L (ref 3.6–5.5)
PROT SERPL-MCNC: 5.3 G/DL (ref 6–8.2)
RBC # BLD AUTO: 2.6 M/UL (ref 4.2–5.4)
SODIUM SERPL-SCNC: 136 MMOL/L (ref 135–145)
WBC # BLD AUTO: 9 K/UL (ref 4.8–10.8)

## 2019-07-10 PROCEDURE — 700102 HCHG RX REV CODE 250 W/ 637 OVERRIDE(OP): Performed by: HOSPITALIST

## 2019-07-10 PROCEDURE — 99239 HOSP IP/OBS DSCHRG MGMT >30: CPT | Performed by: INTERNAL MEDICINE

## 2019-07-10 PROCEDURE — 700102 HCHG RX REV CODE 250 W/ 637 OVERRIDE(OP): Performed by: SURGERY

## 2019-07-10 PROCEDURE — 93010 ELECTROCARDIOGRAM REPORT: CPT | Performed by: INTERNAL MEDICINE

## 2019-07-10 PROCEDURE — 85018 HEMOGLOBIN: CPT

## 2019-07-10 PROCEDURE — 85027 COMPLETE CBC AUTOMATED: CPT

## 2019-07-10 PROCEDURE — A9270 NON-COVERED ITEM OR SERVICE: HCPCS | Performed by: SURGERY

## 2019-07-10 PROCEDURE — 97161 PT EVAL LOW COMPLEX 20 MIN: CPT

## 2019-07-10 PROCEDURE — 93005 ELECTROCARDIOGRAM TRACING: CPT | Performed by: INTERNAL MEDICINE

## 2019-07-10 PROCEDURE — 83735 ASSAY OF MAGNESIUM: CPT

## 2019-07-10 PROCEDURE — A9270 NON-COVERED ITEM OR SERVICE: HCPCS | Performed by: PSYCHIATRY & NEUROLOGY

## 2019-07-10 PROCEDURE — A9270 NON-COVERED ITEM OR SERVICE: HCPCS | Performed by: HOSPITALIST

## 2019-07-10 PROCEDURE — 80053 COMPREHEN METABOLIC PANEL: CPT

## 2019-07-10 PROCEDURE — 97165 OT EVAL LOW COMPLEX 30 MIN: CPT

## 2019-07-10 PROCEDURE — 85014 HEMATOCRIT: CPT

## 2019-07-10 PROCEDURE — 770006 HCHG ROOM/CARE - MED/SURG/GYN SEMI*

## 2019-07-10 PROCEDURE — 700102 HCHG RX REV CODE 250 W/ 637 OVERRIDE(OP): Performed by: PSYCHIATRY & NEUROLOGY

## 2019-07-10 PROCEDURE — 36415 COLL VENOUS BLD VENIPUNCTURE: CPT

## 2019-07-10 RX ORDER — SIMVASTATIN 20 MG
40 TABLET ORAL EVERY EVENING
Qty: 90 TAB | Refills: 2 | Status: SHIPPED | OUTPATIENT
Start: 2019-07-10 | End: 2019-09-11

## 2019-07-10 RX ORDER — TRAMADOL HYDROCHLORIDE 50 MG/1
50-100 TABLET ORAL EVERY 6 HOURS PRN
Qty: 20 TAB | Refills: 0 | Status: SHIPPED | OUTPATIENT
Start: 2019-07-10 | End: 2019-07-10

## 2019-07-10 RX ADMIN — ACETAMINOPHEN 650 MG: 325 TABLET, FILM COATED ORAL at 11:12

## 2019-07-10 RX ADMIN — ACETAMINOPHEN 650 MG: 325 TABLET, FILM COATED ORAL at 04:47

## 2019-07-10 RX ADMIN — ASPIRIN 81 MG: 81 TABLET ORAL at 04:47

## 2019-07-10 RX ADMIN — LISINOPRIL 40 MG: 20 TABLET ORAL at 04:48

## 2019-07-10 RX ADMIN — ALPRAZOLAM 0.25 MG: 0.25 TABLET ORAL at 04:47

## 2019-07-10 RX ADMIN — FLUTICASONE PROPIONATE 100 MCG: 50 SPRAY, METERED NASAL at 04:46

## 2019-07-10 RX ADMIN — CLOPIDOGREL BISULFATE 75 MG: 75 TABLET ORAL at 04:47

## 2019-07-10 RX ADMIN — BUDESONIDE AND FORMOTEROL FUMARATE DIHYDRATE 2 PUFF: 160; 4.5 AEROSOL RESPIRATORY (INHALATION) at 04:46

## 2019-07-10 ASSESSMENT — ENCOUNTER SYMPTOMS
EYE PAIN: 0
TREMORS: 0
WEIGHT LOSS: 0
BLURRED VISION: 1
SHORTNESS OF BREATH: 0
DIARRHEA: 0
NECK PAIN: 0
TINGLING: 0
BACK PAIN: 0
MYALGIAS: 0
VOMITING: 0
CHILLS: 0
HEADACHES: 0
SPEECH CHANGE: 0
COUGH: 0
SPUTUM PRODUCTION: 0
PALPITATIONS: 0
CONSTIPATION: 0
PHOTOPHOBIA: 0
DOUBLE VISION: 0
ABDOMINAL PAIN: 0
ORTHOPNEA: 0
HALLUCINATIONS: 0
DIZZINESS: 0
FEVER: 0
FOCAL WEAKNESS: 0
SENSORY CHANGE: 0
NAUSEA: 0

## 2019-07-10 ASSESSMENT — ACTIVITIES OF DAILY LIVING (ADL): TOILETING: INDEPENDENT

## 2019-07-10 ASSESSMENT — COGNITIVE AND FUNCTIONAL STATUS - GENERAL
SUGGESTED CMS G CODE MODIFIER DAILY ACTIVITY: CH
MOBILITY SCORE: 22
DAILY ACTIVITIY SCORE: 24
SUGGESTED CMS G CODE MODIFIER MOBILITY: CJ
CLIMB 3 TO 5 STEPS WITH RAILING: A LITTLE
WALKING IN HOSPITAL ROOM: A LITTLE

## 2019-07-10 ASSESSMENT — GAIT ASSESSMENTS
GAIT LEVEL OF ASSIST: STAND BY ASSIST
ASSISTIVE DEVICE: NONE;FRONT WHEEL WALKER
DEVIATION: OTHER (COMMENT)
DISTANCE (FEET): 250

## 2019-07-10 ASSESSMENT — LIFESTYLE VARIABLES: SUBSTANCE_ABUSE: 0

## 2019-07-10 NOTE — THERAPY
"Occupational Therapy Evaluation completed.   Functional Status:    Very pleasant 85 y/o female s/p carotid endarterectomy. Pt doing well today, sitting up at EOB on entry. Pt complete UB and LB dressing Mod I, STS supv, and is ambulating around the room steadily without AD. Pt appears to have no functional deficits at this time and is eager to return home. No further acute OT needs at this time.     Plan of Care: Patient with no further skilled OT needs in the acute care setting at this time  Discharge Recommendations:  Equipment: No Equipment Needed. Post-acute therapy Currently anticipate no further skilled therapy needs once patient is discharged from the inpatient setting.    See \"Rehab Therapy-Acute\" Patient Summary Report for complete documentation.    "

## 2019-07-10 NOTE — ANESTHESIA POSTPROCEDURE EVALUATION
Patient: Pat Sevilla    Procedure Summary     Date:  07/09/19 Room / Location:  [place] [address] / SURGERY [facility]    Anesthesia Start:  1738 Anesthesia Stop:  1927    Procedure:  ENDARTERECTOMY, CAROTID (Right Neck) Diagnosis:  (right carotid artery stenosis )    Surgeon:  Nazario Manning M.D. Responsible Provider:  Abel Roldan M.D.    Anesthesia Type:  general ASA Status:  3          Final Anesthesia Type: general  Last vitals  BP   Blood Pressure : 158/83    Temp   37 °C (98.6 °F)    Pulse   Pulse: 79   Resp   16    SpO2   99 %      Anesthesia Post Evaluation    Patient location during evaluation: PACU  Patient participation: complete - patient participated  Level of consciousness: awake and alert  Pain score: 2    Airway patency: patent  Anesthetic complications: no  Cardiovascular status: hemodynamically stable  Respiratory status: acceptable  Hydration status: euvolemic    PONV: none           Nurse Pain Score: 0 (NPRS)

## 2019-07-10 NOTE — PROGRESS NOTES
Surgery    Minimal pain  Tolerating PO  Ambulating    AFVSS    Right neck incision CDI with moderate ecchymosis but no hematoma.  Drain removed and new bandage placed    A/P)  Doing well  Stop plavix  Home today from surgery standpoint  No pain Rx needed. Instructions put in chart  Follow up 2 weeks for progress check.      Nazario Manning MD  General and Vascular Surgery  [facility]  Cell: [phone]

## 2019-07-10 NOTE — ANESTHESIA PREPROCEDURE EVALUATION
Relevant Problems   (+) CAD (coronary artery disease)   (+) COPD (chronic obstructive pulmonary disease) (HCC)   (+) CRI (chronic renal insufficiency)   (+) HTN (hypertension)       Physical Exam    Airway   Mallampati: II  TM distance: >3 FB  Neck ROM: full       Cardiovascular - normal exam  Rhythm: regular  Rate: normal  (-) murmur     Dental - normal exam         Pulmonary - normal exam  Breath sounds clear to auscultation  (+) decreased breath sounds     Abdominal    Neurological - normal exam                 Anesthesia Plan    ASA 3   ASA physical status 3 criteria: COPD    Plan - general       Airway plan will be ETT        Induction: intravenous    Postoperative Plan: Postoperative administration of opioids is intended.    Pertinent diagnostic labs and testing reviewed    Informed Consent:    Anesthetic plan and risks discussed with patient.    Use of blood products discussed with: patient whom consented to blood products.

## 2019-07-10 NOTE — OR NURSING
Pt is AOx4 and has no complaints of pain. Dressing on right neck is partially saturated with blood from the OR; marked site when pt arrived and blood does not appear to be spreading at this time. Pt has no complaints of numbness or tingling or lightheadedness.  are equal and strong bilaterally. VSS. Pt appreaded to have some ST depression on rhythm strip and monitor. Updated Dr Roldan, stat EKG performed and it showed SR with Left BBB.  Called to update daughter, Isis, on pt's status and that pt will return to her same room in S193-2. Pt's daughter went home for the night but will return tomorrow morning. All questions answered, no other needs at this time.

## 2019-07-10 NOTE — ANESTHESIA TIME REPORT
Anesthesia Start and Stop Event Times     Date Time Event    7/9/2019 1738 Anesthesia Start     1927 Anesthesia Stop        Responsible Staff  07/09/19    Name Role Begin End    Abel Roldan M.D. Anesth 1738 1927        Preop Diagnosis (Free Text):  Pre-op Diagnosis     right carotid artery stenosis         Preop Diagnosis (Codes):  Diagnosis Information     Diagnosis Code(s):         Post op Diagnosis  Right-sided extracranial carotid artery stenosis      Premium Reason  B. 1st Call    Comments:

## 2019-07-10 NOTE — PROGRESS NOTES
Hospital Medicine Daily Progress Note    Date of Service  7/10/2019    Chief Complaint  86 y.o. female admitted 7/6/2019 with right eye vision loss.    Hospital Course    86-year-old female transferred from outlying facility with right eye vision loss.  CT scan without acute findings.  Suspected retinal artery occlusion.  Ophthalmology was consulted by outside hospital and transferred to [facility] for higher level care, neurology consultation.    Interval Problem Update  POD#1 Right carotid endarterectomy with bovine pericardial patch   Angioplasty.  Hemoglobin trended down to 7.4 and I ordered repeat H&H at 11 AM.  I evaluated and examined her at the bedside.  She denies any acute complaints  I requested PT/OT evaluation.  I discussed plan of care with her and I answered all her questions.      Consultants/Specialty    Dr. Scruggs, neurology   Vascular surgery    Code Status  Full     Disposition  To be decided  Review of Systems  Review of Systems   Constitutional: Negative for chills, fever and weight loss.   HENT: Negative for hearing loss and tinnitus.    Eyes: Positive for blurred vision (Right eye). Negative for double vision, photophobia and pain.   Respiratory: Negative for cough, sputum production and shortness of breath.    Cardiovascular: Negative for chest pain, palpitations, orthopnea and leg swelling.   Gastrointestinal: Negative for abdominal pain, constipation, diarrhea, nausea and vomiting.   Genitourinary: Negative for dysuria, frequency and urgency.   Musculoskeletal: Negative for back pain, joint pain, myalgias and neck pain.   Skin: Negative for rash.   Neurological: Negative for dizziness, tingling, tremors, sensory change, speech change, focal weakness and headaches.   Psychiatric/Behavioral: Negative for hallucinations and substance abuse.   All other systems reviewed and are negative.       Physical Exam  Temp:  [36.2 °C (97.1 °F)-37 °C (98.6 °F)] 36.2 °C (97.2 °F)  Pulse:  [62-81] 63  Resp:   [13-27] 20  BP: (130-158)/(46-83) 138/54  SpO2:  [97 %-100 %] 100 %    Physical Exam   Constitutional: She is oriented to person, place, and time.   She is laying in bed without any acute distress.   HENT:   Head: Normocephalic and atraumatic.   Eyes: Pupils are equal, round, and reactive to light. Right eye exhibits no discharge. Left eye exhibits no discharge.   Neck: Normal range of motion. Neck supple.   Cardiovascular: Normal rate, regular rhythm and normal heart sounds.  Exam reveals no friction rub.    No murmur heard.  Pulmonary/Chest: Effort normal and breath sounds normal. No respiratory distress. She has no wheezes. She has no rales.   Abdominal: Soft. Bowel sounds are normal. She exhibits no distension. There is no tenderness. There is no rebound.   Musculoskeletal: Normal range of motion. She exhibits no edema or tenderness.   Neurological: She is alert and oriented to person, place, and time. No cranial nerve deficit.   No focal neurological deficit   Skin: Skin is warm and dry. No rash noted. She is not diaphoretic. No erythema.   Psychiatric: She has a normal mood and affect. Her behavior is normal.       Fluids    Intake/Output Summary (Last 24 hours) at 07/10/19 0908  Last data filed at 07/10/19 0815   Gross per 24 hour   Intake             1160 ml   Output              168 ml   Net              992 ml       Laboratory  Recent Labs      07/09/19   0356  07/10/19   0359   WBC  4.6*  9.0   RBC  2.96*  2.60*   HEMOGLOBIN  8.6*  7.4*   HEMATOCRIT  27.7*  24.4*   MCV  93.6  93.8   MCH  29.1  28.5   MCHC  31.0*  30.3*   RDW  47.1  47.3   PLATELETCT  216  213   MPV  10.9  11.1     Recent Labs      07/09/19   0356  07/10/19   0359   SODIUM  140  136   POTASSIUM  4.2  4.4   CHLORIDE  104  102   CO2  28  26   GLUCOSE  92  167*   BUN  16  14   CREATININE  1.11  1.02   CALCIUM  9.2  8.9                   Imaging  US-CAROTID DOPPLER BILAT   Final Result      MR-MRA NECK-W/O   Final Result      1.   Nonvisualization of the right external carotid artery most consistent with occlusion.   2.  Suspect high-grade stenosis at the origin of the right cervical ICA despite misregistration artifact on the 2-D time-of-flight images. The MOTSA 3-D time-of-flight images show weblike high-grade stenosis. Correlation with carotid duplex Doppler    ultrasound may be of interest.      MR-MRA HEAD-W/O   Final Result      1. MRA OF THE [place] OF NEAL WITHIN NORMAL LIMITS WITH NO EVIDENCE OF ANEURYSM OR CEREBROVASCULAR OCCLUSIVE DISEASE.      MR-BRAIN-W/O   Final Result      1.  Moderate cerebral atrophy. Age-appropriate.   2.  Mild supratentorial white matter disease most consistent with microvascular ischemic change. Common findings for the patient's age.   3.  No evidence of acute cerebral infarction, acute hemorrhage, or mass lesion.      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   Patient was outside of the window for TPA.   Neurology initially  consulted.   Continue aspirin, statin therapy  Ultrasound with severe, greater than 70% right-sided ICA stenosis.   Vascular surgery evaluated her and she underwent right carotid endarterectomy on July 9, 2019  Continue aspirin, Plavix, statin   Appreciate vascular surgery accommodations.     HTN (hypertension)- (present on admission)   Assessment & Plan    Continue home lisinopril.  Continue to monitor     COPD (chronic obstructive pulmonary disease) (HCC)- (present on admission)   Assessment & Plan    She does not show any signs of acute COPD exacerbation.     CAD (coronary artery disease)- (present on admission)   Assessment & Plan    Continue statin , antiplatelet therapies  No symptoms of acute chest pain.     Anemia- (present on admission)   Assessment & Plan    Chronic without signs of active bleeding  Iron panel, B12 and TSH within normal  limits  Continue to monitor hemoglobin  Transfuse if hemoglobin less than 7 or she becomes symptomatic.       I discussed plan of care during multidisciplinary rounds.    VTE prophylaxis: SCDs

## 2019-07-10 NOTE — CARE PLAN
Problem: Safety  Goal: Will remain free from injury  Outcome: PROGRESSING AS EXPECTED  Treaded socks in place. Bed locked and in lowest position. Call light within reach.     Problem: Safety:  Goal: Will remain free from injury  Outcome: PROGRESSING AS EXPECTED  Treaded socks in place. Bed locked and in lowest position. Call light within reach.     Problem: Mobility:  Goal: Mobility will improve  Outcome: PROGRESSING AS EXPECTED  Pt ambulating to bathroom with standby assist and steady gait.

## 2019-07-10 NOTE — PROGRESS NOTES
Assumed care of pt at 0700.   Pt is A&Ox4.   Pt denies n/v, n/t, and pain at this time.   Pt has impaired vision to R eye.   OSWALDO drain in place to R neck, dressing CDI.   Pt is standby assist to bathroom.   Pt is on 4L O2 via NC.   Plan of care discussed.   Hourly rounding in place.

## 2019-07-10 NOTE — PROGRESS NOTES
Pt provided with discharge instructions and verbalized understanding. All questions answered at this time. PIVs removed. All belongings gathered. Pt provided with extra dressing supplies. Pt left via wheelchair with CNA escort at 1600.

## 2019-07-10 NOTE — OP REPORT
DATE OF SERVICE:  07/09/2019    PREOPERATIVE DIAGNOSIS:  Symptomatic right carotid stenosis.    POSTOPERATIVE DIAGNOSIS:  Symptomatic right carotid stenosis.    PROCEDURE:  Right carotid endarterectomy with bovine pericardial patch   angioplasty.    SURGEON:  Nazario Manning MD    ASSISTANT:  GEORGES Troy    ANESTHESIA:  General.    BLOOD LOSS:  20 mL    SPECIMENS:  None sent.    DISPOSITION:  Patient was extubated and sent to recovery in stable condition.    INDICATIONS:  The patient is an 86-year-old female who was admitted to the   hospital with an episode of acute right eye blindness.  Workup revealed a   high-grade right carotid stenosis.  I therefore recommended the patient   undergo right carotid endarterectomy to prevent further embolization.  We   discussed the risks of the operation, including but not limited to bleeding,   infection, injury to vessels or nerves, risk of anesthesia and local risks   associated with surgery such as stroke, heart attack, pulmonary complications,   and even potentially not surviving the operation or the recovery.  We also   discussed alternatives to endarterectomy such as stenting or medical   management; however, based on her medical history and the high-grade carotid   stenosis, I felt that she will be best served with an endarterectomy.  All of   her questions were answered and the patient agreed to right carotid   endarterectomy and informed consent was obtained.    DESCRIPTION OF PROCEDURE:  The patient was placed supine on the operating   table and general anesthesia was administered.  An arterial line was placed by   the anesthesia service.  The patient's right neck was prepped and draped   sterile.  Surgical timeout was called and everyone was in agreement.  The   patient received preoperative prophylactic antibiotics.    Local anesthetic was infiltrated along the anterior border of the right   sternocleidomastoid and then a longitudinal incision was  made.  This was   carried down into the carotid sheath using electrocautery for hemostasis.  The   internal jugular vein was identified and retracted laterally.  The common   facial vein was identified and ligated between silk sutures.  This revealed   the carotid bulb.  We dissected out the common carotid, external carotid, and   internal carotid arteries in the usual fashion and we placed a Rumel   tourniquet around the common carotid artery and we placed vessel loops around   the external and internal carotid artery.  The patient was systemically   heparinized and after allowing adequate time to circulate, we clamped the   artery with the internal carotid being clamped first and then we made a   longitudinal arteriotomy across the bulb and we inserted an Viola shunt.    Shunt patency was verified with a Doppler.  We then performed an   endarterectomy and we tacked the distal endpoint with 6-0 Prolene sutures.  We   then repaired the artery with a bovine pericardial patch and a running 6-0   Prolene suture line.  The shunt was removed and the artery was flushed and   irrigated and then the shunt repair was completed.  The clamps were removed   with the internal carotid clamp being removed last.  Flow was assessed in the   internal carotid artery with a Doppler and this showed continuous antegrade   flow in systole and diastole with low resistance.  Topical hemostatic was   applied and the heparin was reversed with protamine.  A 7 flat fluted OSWALDO drain   was placed in the incision and exited at the base of the neck.  The incision   was closed with multiple layers of absorbable suture and a dry sterile   dressing was placed.  Patient tolerated the procedure well.  All counts were   correct.  She was extubated and sent to recovery in stable condition.       ____________________________________     MD MELISSA You / JESSICA    DD:  07/09/2019 19:16:44  DT:  07/09/2019 21:15:22    D#:  [id number]  Job#:   [id number]

## 2019-07-10 NOTE — THERAPY
"Physical Therapy Evaluation completed.   Bed Mobility:  Supine to Sit: Supervised  Transfers: Sit to Stand: Supervised  Gait: Level Of Assist: Stand by Assist with No Equipment Needed       Plan of Care: Patient with no further skilled PT needs in the acute care setting at this time  Discharge Recommendations: Equipment: No Equipment Needed. 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 Pt is able to perform bed mobility and sit <> stands with spv, and ambulate 250' with no AD and SBA. Patient has slight LOB ambulating with no AD but was able to right herself. Pt was more stable with a FWW, and states that she will use her 4WW at home. Pt reports that she is very confident that she will be able to complete all ADL/IADLs upon return to home, and has the occassional help of er daughter (lives about an hour away), and her neighbors. Pt understands she is no longer able to drive, and understands the options available to her for transportation in her community. Pt is functionally capable of DC home with her 4WW at this time. Pt would additionally benefit from outpatient PT for higher level balance training at time of DC. Please refer to OT DC recommendations regarding self-care.     See \"Rehab Therapy-Acute\" Patient Summary Report for complete documentation.     "

## 2019-07-10 NOTE — CARE PLAN
Problem: Communication  Goal: The ability to communicate needs accurately and effectively will improve  Outcome: PROGRESSING AS EXPECTED  Discussed plan of care, pt expressed verbal understanding. Questions/ concerns encouraged to be voiced    Problem: Safety  Goal: Will remain free from injury  Outcome: PROGRESSING AS EXPECTED  fall precautions in place - strip alarm on, call light within reach, nonslip footwear on, bed locked/ placed in lowest position, side rails up x2, hourly rounding.

## 2019-07-10 NOTE — ANESTHESIA PROCEDURE NOTES
Arterial Line  Performed by: MICHELLE BETANCOURT  Authorized by: MICHELLE BETANCOURT     Start Time:  7/9/2019 5:38 PM  End Time:  7/9/2019 5:38 PM  Localization: surface landmarks    Patient Location:  OR  Indication: continuous blood pressure monitoring    Catheter Size:  20 G  Seldinger Technique?: No    Laterality:  Left  Site:  Radial artery  Line Secured:  Transparent dressing  Events: patient tolerated procedure well with no complications

## 2019-07-10 NOTE — DISCHARGE INSTRUCTIONS
Discharge Instructions  Discharge Instructions  Procedure: carotid endarterectomy     1. ACTIVITIES: No strenuous activities or lifting greater than 20 pounds for 2 weeks after surgery.     2. DRIVING: You may drive whenever you are off pain medications and are able to perform the activities needed to drive, i.e. turning, bending, twisting, etc.      3. WOUND: It is not unusual for patients to experience swelling and even bruising, as well as a small amount of drainage from the incision. This is normal and will resolve over the next few days.      4. ICE: you may place ice on the wound to decrease the swelling for the first 24 hours and then discontinue. Apply ice for 20 minutes and then remove for 20 minutes, alternating while awake.     5. BATHING: Remove your white bandage 1-2 days after discharge, no new bandage is required. OK to shower with the bandage on and after the bandage is removed the incision can get wet directly.     6. PAIN MEDICATION: You will be given a prescription for pain medication at discharge. Please take these as directed. It is important to remember not to take medications on an empty stomach as this may cause nausea.      7. BOWEL FUNCTION: After surgery, it is not uncommon for patients to experience constipation. This is due to decreasing activity levels as well as pain medications. You may wish to use a stool softener beginning immediately after surgery, and you may or may not need to use a laxative (Milk of Magnesia, Ex-lax; Senokot, etc.) as well.      8 .CALL IF YOU HAVE: (1) Fevers to more than 101.0 F, (2) Unusual or excessive pain, (3) Drainage or fluid from incision that may be foul smelling, increased tenderness or soreness at the wound or the wound edges are no longer together, redness or swelling at the incision site. Please do not hesitate to call with any other questions.      9. APPOINTMENT: Contact our office at [phone] for a follow-up appointment 2 weeks following  your procedure.     Discharged to home by car with relative. Discharged via wheelchair, hospital escort: Yes.  Special equipment needed: Walker    Be sure to schedule a follow-up appointment with your primary care doctor or any specialists as instructed.     Discharge Plan:   Diet Plan: Discussed  Activity Level: Discussed  Confirmed Follow up Appointment: Patient to Call and Schedule Appointment  Confirmed Symptoms Management: Discussed  Medication Reconciliation Updated: Yes  Pneumococcal Vaccine Administered/Refused: Not given - Patient refused pneumococcal vaccine  Influenza Vaccine Indication: Patient Refuses    I understand that a diet low in cholesterol, fat, and sodium is recommended for good health. Unless I have been given specific instructions below for another diet, I accept this instruction as my diet prescription.   Other diet: Regular Diet    Special Instructions:     Stroke/CVA/TIA/Hemorrhagic Ischemia Discharge Instructions  You have had a stroke. Your risk factors have been identified as follows:  Age - Over 55  High blood pressure  Heart disease  It is important that you reduce your risk factors to avoid another stroke in the future. Here are some general guidelines to follow:  · Eat healthy - avoid food high in fat.  · Get regular exercise.  · Maintain a healthy weight.  · Avoid smoking.  · Avoid alcohol and illegal drug use.  · Take your medications as directed.  For more information regarding risk factors, refer to pages 17-19 in your Stroke Patient Education Guide. Stroke Education Guide was given to patient.    Warning signs of a stroke include (which can also be found on page 3 of your Stroke Patient Education Guide):  · Sudden numbness of weakness of the face, arm or leg (especially on one side of the body).  · Sudden confusion, trouble speaking or understanding.  · Sudden trouble seeing in one or both eyes.  · Sudden trouble walking, dizziness, loss of balance or coordination.  · Sudden  severe headache with no known cause.  It is very important to get treatment quickly when a stroke occurs. If you experience any of the above warning signs, call [phone] immediately.     Some patients who have had a stroke will be going home on a blood thinner medication called Warfarin (Coumadin).  This medication requires very close monitoring and follow up.  This follow up can be provided by either your Primary Care Physician or by [facility]tico[facility].  The Outpatient Anticoagulation Service is located at the [facility] at [facility] ([facility]).  If you do not know when your follow up appointment is scheduled, call [phone] to verify your appointment time.      · Is patient discharged on Warfarin / Coumadin?   No     Carotid Endarterectomy   A carotid endarterectomy is a surgery to remove a blockage in the carotid arteries. The carotid arteries are the large blood vessels on both sides of the neck that supply blood to the brain. Carotid artery disease, also called carotid artery stenosis, is the narrowing or blockage of one or both carotid arteries. Carotid artery disease is usually caused by atherosclerosis, which is a buildup of fat and plaque in the arteries. Some plaque buildup normally occurs with aging. The plaque may partially or totally block blood flow or cause a clot to form in the carotid arteries.   LET YOUR HEALTH CARE PROVIDER KNOW ABOUT:  · Any allergies you have.    · All medicines you are taking, including vitamins, herbs, eye drops, creams, and over-the-counter medicines.    · Use of steroids (by mouth or creams).    · Previous problems you or members of your family have had with the use of anesthetics.    · Any blood disorders you have.    · Previous surgeries you have had.    · Medical conditions you have, including diabetes and kidney problems.    · Possibility of pregnancy, if this applies.    RISKS AND  COMPLICATIONS   Generally, carotid endarterectomy is a safe procedure. However, problems can occur and include:  · Bleeding.    · Infection.    · Transient ischemic attacks (TIAs). A TIA results from poor blood flow to the brain, but there is no permanent loss of brain function.    · Stroke.    · Heart attack (myocardial infarction).    · High blood pressure (hypertension).    · Injury to nerves near the carotid arteries.    BEFORE THE PROCEDURE     · Ask your health care provider about changing or stopping your regular medicines. This is especially important if you are taking diabetes medicines or blood thinners.  · Do not eat or drink anything after midnight on the night before the procedure or as directed by your health care provider. Ask your health care provider if it is okay to take any needed medicines with a sip of water.    · Do not smoke for as long as possible before the surgery. Smoking will increase the chance of a healing problem after surgery.    · You may need to have blood tests, a test to check heart rhythm (electrocardiography), or a test to check blood flow (angiography) done before the surgery.    PROCEDURE   · You will be given a medicine that makes you fall asleep (general anesthetic).  · After you receive the anesthetic, the surgeon will make a small cut (incision) in your neck to expose the carotid artery.  · A tube may be inserted into the carotid artery above and below the blockage. This tube will temporarily allow blood to flow around the blockage during the surgery.  · An incision will be made in the carotid artery at the location of the blockage, and the blockage will then be removed. In some cases, a section of the carotid artery may be removed and a graft patch used to repair the artery.  · The carotid artery will then be closed with stitches (sutures).  · If a tube was inserted into the artery to allow blood flow around the blockage during surgery, the tube will be removed. With the  tube removal, blood flow to the brain will be restored through the carotid artery.  · The incision in the neck will then be closed with sutures.  AFTER THE PROCEDURE     You may have some pain or an ache in your neck for a couple weeks. This is normal.   This information is not intended to replace advice given to you by your health care provider. Make sure you discuss any questions you have with your health care provider.  Document Released: 08/20/2014 Document Revised: 01/08/2016 Document Reviewed: 08/20/2014  Elsevier Interactive Patient Education © 2017 [employer]      Depression / Suicide Risk    As you are discharged from this [facility] facility, it is important to learn how to keep safe from harming yourself.    Recognize the warning signs:  · Abrupt changes in personality, positive or negative- including increase in energy   · Giving away possessions  · Change in eating patterns- significant weight changes-  positive or negative  · Change in sleeping patterns- unable to sleep or sleeping all the time   · Unwillingness or inability to communicate  · Depression  · Unusual sadness, discouragement and loneliness  · Talk of wanting to die  · Neglect of personal appearance   · Rebelliousness- reckless behavior  · Withdrawal from people/activities they love  · Confusion- inability to concentrate     If you or a loved one observes any of these behaviors or has concerns about self-harm, here's what you can do:  · Talk about it- your feelings and reasons for harming yourself  · Remove any means that you might use to hurt yourself (examples: pills, rope, extension cords, firearm)  · Get professional help from the community (Mental Health, Substance Abuse, psychological counseling)  · Do not be alone:Call your Safe Contact- someone whom you trust who will be there for you.  · Call your local CRISIS HOTLINE 544-5091 or 556-601-2167  · Call your local Children's Mobile Crisis Response Team [facility] (434)  303-1776 or www.[employer]  · Call the toll free National Suicide Prevention Hotlines   · National Suicide Prevention Lifeline 267-819-QAEM (7951)  · National [facility] Line Network 800-SUICIDE (781-0529)

## 2019-07-10 NOTE — PROGRESS NOTES
Discharge Instructions  Procedure: carotid endarterectomy    1. ACTIVITIES: No strenuous activities or lifting greater than 20 pounds for 2 weeks after surgery.    2. DRIVING: You may drive whenever you are off pain medications and are able to perform the activities needed to drive, i.e. turning, bending, twisting, etc.     3. WOUND: It is not unusual for patients to experience swelling and even bruising, as well as a small amount of drainage from the incision. This is normal and will resolve over the next few days.     4. ICE: you may place ice on the wound to decrease the swelling for the first 24 hours and then discontinue. Apply ice for 20 minutes and then remove for 20 minutes, alternating while awake.    5. BATHING: Remove your white bandage 1-2 days after discharge, no new bandage is required. OK to shower with the bandage on and after the bandage is removed the incision can get wet directly.    6. PAIN MEDICATION: You will be given a prescription for pain medication at discharge. Please take these as directed. It is important to remember not to take medications on an empty stomach as this may cause nausea.     7. BOWEL FUNCTION: After surgery, it is not uncommon for patients to experience constipation. This is due to decreasing activity levels as well as pain medications. You may wish to use a stool softener beginning immediately after surgery, and you may or may not need to use a laxative (Milk of Magnesia, Ex-lax; Senokot, etc.) as well.     8 .CALL IF YOU HAVE: (1) Fevers to more than 101.0 F, (2) Unusual or excessive pain, (3) Drainage or fluid from incision that may be foul smelling, increased tenderness or soreness at the wound or the wound edges are no longer together, redness or swelling at the incision site. Please do not hesitate to call with any other questions.     9. APPOINTMENT: Contact our office at [phone] for a follow-up appointment 2 weeks following your procedure.     If you have any  additional questions, please do not hesitate to call the office and speak to either myself or the physician on call.     Office address:   Nazario Manning MD, [facility]  [address] [place] [address] [address], [facility] [address]  [phone]

## 2019-07-10 NOTE — ANESTHESIA PROCEDURE NOTES
Airway  Date/Time: 7/9/2019 5:44 PM  Performed by: MICHELLE BETANCOURT  Authorized by: MICHELLE BETANCOURT     Location:  OR  Urgency:  Elective  Indications for Airway Management:  Anesthesia  Spontaneous Ventilation: absent    Sedation Level:  Deep  Preoxygenated: Yes    Patient Position:  Sniffing  Final Airway Type:  Endotracheal airway  Final Endotracheal Airway:  ETT  Cuffed: Yes    Technique Used for Successful ETT Placement:  Direct laryngoscopy  Insertion Site:  Oral  Blade Type:  Mihai  Laryngoscope Blade/Videolaryngoscope Blade Size:  3  ETT Size (mm):  7.0  Measured from:  Teeth  ETT to Teeth (cm):  22  Placement Verified by: auscultation and capnometry    Cormack-Lehane Classification:  Grade I - full view of glottis  Number of Attempts at Approach:  1

## 2019-07-10 NOTE — PROGRESS NOTES
Hospital Medicine Daily Progress Note    Date of Service  7/9/2019    Chief Complaint  86 y.o. female admitted 7/6/2019 with right eye vision loss.    Hospital Course    86-year-old female transferred from outlying facility with right eye vision loss.  CT scan without acute findings.  Suspected retinal artery occlusion.  Ophthalmology was consulted by outside hospital and transferred to [facility] for higher level care, neurology consultation.    Interval Problem Update  I evaluated and examined her at the bedside.  She denies any acute complaints  She is going to have right carotid endarterectomy today by vascular surgery.  I discussed plan of care with her and answered all questions.    Consultants/Specialty    Dr. Scruggs, neurology   Vascular surgery    Code Status  Full     Disposition  To be decided  Review of Systems  Review of Systems   Constitutional: Negative for chills, fever and weight loss.   HENT: Negative for hearing loss and tinnitus.    Eyes: Positive for blurred vision (Right eye). Negative for double vision, photophobia and pain.   Respiratory: Negative for cough, sputum production and shortness of breath.    Cardiovascular: Negative for chest pain, palpitations, orthopnea and leg swelling.   Gastrointestinal: Negative for abdominal pain, constipation, diarrhea, nausea and vomiting.   Genitourinary: Negative for dysuria, frequency and urgency.   Musculoskeletal: Negative for back pain, joint pain, myalgias and neck pain.   Skin: Negative for rash.   Neurological: Negative for dizziness, tingling, tremors, sensory change, speech change, focal weakness and headaches.   Psychiatric/Behavioral: Negative for hallucinations and substance abuse.   All other systems reviewed and are negative.       Physical Exam  Temp:  [36.1 °C (97 °F)-37.7 °C (99.8 °F)] 36.2 °C (97.2 °F)  Pulse:  [61-84] 73  Resp:  [16-18] 16  BP: (130-188)/(49-83) 158/83  SpO2:  [96 %-100 %] 99 %    Physical Exam   Constitutional: She is  oriented to person, place, and time.   She is laying in bed without any acute distress.   HENT:   Head: Normocephalic and atraumatic.   Eyes: Pupils are equal, round, and reactive to light. Right eye exhibits no discharge. Left eye exhibits no discharge.   Neck: Normal range of motion. Neck supple.   Cardiovascular: Normal rate, regular rhythm and normal heart sounds.  Exam reveals no friction rub.    No murmur heard.  Pulmonary/Chest: Effort normal and breath sounds normal. No respiratory distress. She has no wheezes. She has no rales.   Abdominal: Soft. Bowel sounds are normal. She exhibits no distension. There is no tenderness. There is no rebound.   Musculoskeletal: Normal range of motion. She exhibits no edema or tenderness.   Neurological: She is alert and oriented to person, place, and time. No cranial nerve deficit.   No focal neurological deficit   Skin: Skin is warm and dry. No rash noted. She is not diaphoretic. No erythema.   Psychiatric: She has a normal mood and affect. Her behavior is normal.       Fluids    Intake/Output Summary (Last 24 hours) at 07/09/19 1949  Last data filed at 07/09/19 1920   Gross per 24 hour   Intake             1340 ml   Output               50 ml   Net             1290 ml       Laboratory  Recent Labs      07/07/19   0325  07/09/19   0356   WBC  4.7*  4.6*   RBC  2.82*  2.96*   HEMOGLOBIN  8.3*  8.6*   HEMATOCRIT  26.5*  27.7*   MCV  94.0  93.6   MCH  29.4  29.1   MCHC  31.3*  31.0*   RDW  46.8  47.1   PLATELETCT  217  216   MPV  10.8  10.9     Recent Labs      07/07/19   0325  07/09/19   0356   SODIUM  139  140   POTASSIUM  4.5  4.2   CHLORIDE  105  104   CO2  28  28   GLUCOSE  101*  92   BUN  12  16   CREATININE  1.19  1.11   CALCIUM  9.1  9.2                   Imaging  US-CAROTID DOPPLER BILAT   Final Result      MR-MRA NECK-W/O   Final Result      1.  Nonvisualization of the right external carotid artery most consistent with occlusion.   2.  Suspect high-grade stenosis at  the origin of the right cervical ICA despite misregistration artifact on the 2-D time-of-flight images. The [facility] 3-D time-of-flight images show weblike high-grade stenosis. Correlation with carotid duplex Doppler    ultrasound may be of interest.      MR-MRA HEAD-W/O   Final Result      1. MRA OF THE [place] OF NEAL WITHIN NORMAL LIMITS WITH NO EVIDENCE OF ANEURYSM OR CEREBROVASCULAR OCCLUSIVE DISEASE.      MR-BRAIN-W/O   Final Result      1.  Moderate cerebral atrophy. Age-appropriate.   2.  Mild supratentorial white matter disease most consistent with microvascular ischemic change. Common findings for the patient's age.   3.  No evidence of acute cerebral infarction, acute hemorrhage, or mass lesion.      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   Patient was outside of the window for TPA.   Neurology initially  consulted.   Continue aspirin, statin therapy  Ultrasound with severe, greater than 70% right-sided ICA stenosis.   Vascular surgery evaluated her and she is going to have right carotid endarterectomy on July 9, 2019  Continue aspirin, Plavix, statin   Appreciate vascular surgery accommodations.     Carotid artery stenosis- (present on admission)   Assessment & Plan    Bilateral with greater than 70% right ICA stenosis.  Vascular surgery evaluated her and she is going to have right carotid endarterectomy today by vascular surgery on July 9, 2019.  I discussed plan of care with her.     HTN (hypertension)- (present on admission)   Assessment & Plan    Continue home lisinopril.  Continue to monitor     COPD (chronic obstructive pulmonary disease) (HCC)- (present on admission)   Assessment & Plan    She does not show any signs of acute COPD exacerbation.     CAD (coronary artery disease)- (present on admission)   Assessment & Plan    Continue statin , antiplatelet therapies  No  symptoms of acute chest pain.     Anemia- (present on admission)   Assessment & Plan    Chronic without signs of active bleeding  Iron panel, B12 and TSH within normal limits  Continue to monitor hemoglobin  Transfuse if hemoglobin less than 7 or she becomes symptomatic.       I discussed plan of care during multidisciplinary rounds.    VTE prophylaxis: SCDs

## 2019-07-10 NOTE — PROGRESS NOTES
Vascular    OR today for right carotid endarterectomy    I had a very detailed, thorough discussion with the patient regarding the severity of her carotid disease and that she is at high risk of stroke.  I explained the primary purpose of this operation is to prevent stroke from the plaque in the carotid artery.  I explained the details of the operation including neuro monitoring and possible use of a shunt.  I discussed the alternatives of optimal medical management or stenting, although carotid endarterectomy is preferable in the setting of such severe plaque and in someone who is acceptable risk for surgery.  I discussed the potential risks, including but not limited to bleeding, infection, injury to vessels or nerves, and risks of anesthesia. I explained the risk that the operation itself can cause a stroke, although the risk of stroke from the operation is less than the risk of stroke if the plaque is not treated, and thus carotid endarterectomy is recommended.  All of their questions were answered. Patient understands and agrees to proceed.    Nazario Manning MD  [facility] (General and Vascular Surgery)  Cell: [phone] (text or call is fine, if you don't reach me please try my office)  Office: [phone]  __________________________________________________________________  Patient:Pat Sevilla   MRN:[id number]   CSN:[id number]    7/9/2019    5:13 PM

## 2019-07-10 NOTE — ANESTHESIA QCDR
2019 [facility] Clinical Data Registry (for Quality Improvement)     Postoperative nausea/vomiting risk protocol (Adult = 18 yrs and Pediatric 3-17 yrs)- (430 and 463)  General inhalation anesthetic (NOT TIVA) with PONV risk factors: Yes  Provision of anti-emetic therapy with at least 2 different classes of agents: Yes   Patient DID NOT receive anti-emetic therapy and reason is documented in Medical Record:  N/A    Multimodal Pain Management- (AQI59)  Patient undergoing Elective Surgery (i.e. Outpatient, or ASC, or Prescheduled Surgery prior to Hospital Admission): No  Use of Multimodal Pain Management, two or more drugs and/or interventions, NOT including systemic opioids: N/A  Exception: Documented allergy to multiple classes of analgesics: N/A    PACU assessment of acute postoperative pain prior to Anesthesia Care End- Applies to Patients Age = 18- (ABG7)  Initial PACU pain score is which of the following: < 7/10  Patient unable to report pain score: N/A    Post-anesthetic transfer of care checklist/protocol to PACU/ICU- (426 and 427)  Upon conclusion of case, patient transferred to which of the following locations: PACU/Non-ICU  Use of transfer checklist/protocol: Yes  Exclusion: Service Performed in Patient Hospital Room (and thus did not require transfer): N/A    PACU Reintubation- (AQI31)  General anesthesia requiring endotracheal intubation (ETT) along with subsequent extubation in OR or PACU: No  Required reintubation in the PACU: N/A  Extubation was a planned trial documented in the medical record prior to removal of the original airway device: N/A    Unplanned admission to ICU related to anesthesia service up through end of PACU care- (MD51)  Unplanned admission to ICU (not initially anticipated at anesthesia start time): No

## 2019-07-11 ENCOUNTER — TELEPHONE (OUTPATIENT)
Dept: MEDICAL GROUP | Facility: PHYSICIAN GROUP | Age: 84
End: 2019-07-11

## 2019-07-11 ENCOUNTER — TELEPHONE (OUTPATIENT)
Dept: CARDIOLOGY | Facility: MEDICAL CENTER | Age: 84
End: 2019-07-11

## 2019-07-11 DIAGNOSIS — I25.10 CORONARY ARTERY DISEASE INVOLVING NATIVE CORONARY ARTERY OF NATIVE HEART WITHOUT ANGINA PECTORIS: ICD-10-CM

## 2019-07-11 DIAGNOSIS — J44.9 CHRONIC OBSTRUCTIVE PULMONARY DISEASE, UNSPECIFIED COPD TYPE (HCC): ICD-10-CM

## 2019-07-11 DIAGNOSIS — I10 ESSENTIAL HYPERTENSION: ICD-10-CM

## 2019-07-11 DIAGNOSIS — J96.11 CHRONIC RESPIRATORY FAILURE WITH HYPOXIA (HCC): ICD-10-CM

## 2019-07-11 NOTE — TELEPHONE ENCOUNTER
Referral placed and faxed to referral desk.  I tried to call her but did not get an answer.  I mailed her the referral with a note attached.

## 2019-07-11 NOTE — TELEPHONE ENCOUNTER
Patient lives in [place], and she calls today requesting assistance in her home and help with transportation.  To Lucia:  Would you like me to put in a referral to home health or , or would you prefer to have PCP do this?

## 2019-07-11 NOTE — DISCHARGE SUMMARY
Discharge Summary    CHIEF COMPLAINT ON ADMISSION  No chief complaint on file.      Reason for Admission  central retinal artery occlusion     Admission Date  7/6/2019    CODE STATUS  Prior    HPI & HOSPITAL COURSE      86-year-old female transferred from outlying facility with right eye vision loss.  CT scan without acute findings.  Suspected retinal artery occlusion.  Ophthalmology was consulted by outside hospital and transferred to [facility] for higher level care and neurology consultation.  Patient was evaluated by neurology and recommended aspirin 81 mg daily and no further intervention from neurological standpoint.  She found to have right internal carotid artery stenosis and vascular surgery was consulted and she underwent Right carotid endarterectomy with bovine pericardial patch angioplasty on July 9, 2019.  Her symptoms of right vision problem has improved.  Today I evaluated and examined her at the bedside and she denies any acute complaints.  Physical therapy and occupational therapy evaluated her and recommended that she does not have any further therapy needs.  I strongly recommended her to follow-up with primary care provider and also with vascular surgery.  I discussed with vascular surgeon regarding plan of care.    Therefore, she is discharged in fair and stable condition to home with close outpatient follow-up.    The patient met 2-midnight criteria for an inpatient stay at the time of discharge.    Discharge Date  7/10/2019    FOLLOW UP ITEMS POST DISCHARGE  Primary care provider  Vascular surgery    DISCHARGE DIAGNOSES  Principal Problem (Resolved):    Acute visual loss, right POA: Unknown  Active Problems:    CAD (coronary artery disease) POA: Yes      Overview: 2008: PCI/BMS x 2 to the LCx, mid (MiniVision 2.25 x 12mm) and proximal       (MiniVision 2.0 x 12mm)      March 2016: MPI with no ischemia or infarct, LVEF 70%.    COPD (chronic obstructive pulmonary disease) (HCC) POA: Yes    HTN  (hypertension) POA: Yes    Anemia (Chronic) POA: Yes      Overview: normocytic  Resolved Problems:    Carotid artery stenosis POA: Yes      Overview: April 2019: Carotid ultrasound with <50% stenosis bilaterally.      December 2016: Carotid ultrasound with <50% stenosis bilaterally.      FOLLOW UP  Future Appointments  Date Time Provider Department Center   9/4/2019 1:00 PM [facility] None     Florian Henley M.D.  [address]  [facility] [id number] [phone]    Schedule an appointment as soon as possible for a visit      Nazario Manning M.D.  [address]  [facility] [id number] [phone]    Schedule an appointment as soon as possible for a visit in 2 weeks  For Progress Check      MEDICATIONS ON DISCHARGE     Medication List      CHANGE how you take these medications      Instructions   lisinopril 40 MG tablet  What changed:  Another medication with the same name was removed. Continue taking this medication, and follow the directions you see here.  Commonly known as:  PRINIVIL, ZESTRIL   TAKE ONE TABLET BY MOUTH DAILY     simvastatin 20 MG Tabs  What changed:  · how much to take  · when to take this  Commonly known as:  ZOCOR   Take 2 Tabs by mouth every evening.  Dose:  40 mg        CONTINUE taking these medications      Instructions   * fluticasone-salmeterol 500-50 MCG/DOSE Aepb  Commonly known as:  ADVAIR   Inhale 1 Puff by mouth every 12 hours.  Dose:  1 Puff     * ADVAIR DISKUS 500-50 MCG/DOSE Aepb  Generic drug:  fluticasone-salmeterol      albuterol 108 (90 Base) MCG/ACT Aers inhalation aerosol   Inhale 2 Puffs by mouth every 6 hours as needed. ud  Dose:  2 Puff     ALPRAZolam 0.5 MG Tabs  Start taking on:  8/25/2019  Commonly known as:  XANAX   Take 1 Tab by mouth at bedtime as needed for Sleep for up to 30 days. 1/2 in the morning and 1 at night  Dose:  0.5 mg     aspirin EC 81 MG Tbec  Commonly known as:  ECOTRIN   Take 81 mg by mouth every day.  Indications: Heart Attack  Dose:  81 mg     INCRUSE ELLIPTA 62.5 MCG/INH Aepb  Generic drug:  Umeclidinium Bromide      lidocaine 5 % Oint  Commonly known as:  XYLOCAINE   Apply  to affected area(s) as needed.     nitroglycerin 0.4 MG Subl  Commonly known as:  NITROSTAT   Place 1 Tab under tongue as needed for Chest Pain. Not to exceed 3 tablets in 15 minutes  Dose:  0.4 mg        * This list has 2 medication(s) that are the same as other medications prescribed for you. Read the directions carefully, and ask your doctor or other care provider to review them with you.            STOP taking these medications    azithromycin 250 MG Tabs  Commonly known as:  ZITHROMAX            Allergies  Allergies   Allergen Reactions   • Codeine        DIET  No orders of the defined types were placed in this encounter.      ACTIVITY  As tolerated.  Weight bearing as tolerated    CONSULTATIONS  Neurology  Vascular surgery    PROCEDURES  Right carotid endarterectomy with bovine pericardial patch   angioplasty.    LABORATORY  Lab Results   Component Value Date    SODIUM 136 07/10/2019    POTASSIUM 4.4 07/10/2019    CHLORIDE 102 07/10/2019    CO2 26 07/10/2019    GLUCOSE 167 (H) 07/10/2019    BUN 14 07/10/2019    CREATININE 1.02 07/10/2019    CREATININE 0.84 07/12/2012        Lab Results   Component Value Date    WBC 9.0 07/10/2019    HEMOGLOBIN 7.9 (L) 07/10/2019    HEMATOCRIT 25.6 (L) 07/10/2019    PLATELETCT 213 07/10/2019        US-CAROTID DOPPLER BILAT   Final Result      MR-MRA NECK-W/O   Final Result      1.  Nonvisualization of the right external carotid artery most consistent with occlusion.   2.  Suspect high-grade stenosis at the origin of the right cervical ICA despite misregistration artifact on the 2-D time-of-flight images. The [facility] 3-D time-of-flight images show weblike high-grade stenosis. Correlation with carotid duplex Doppler    ultrasound may be of interest.      MR-MRA HEAD-W/O   Final Result      1. MRA OF THE  [place] OF NEAL WITHIN NORMAL LIMITS WITH NO EVIDENCE OF ANEURYSM OR CEREBROVASCULAR OCCLUSIVE DISEASE.      MR-BRAIN-W/O   Final Result      1.  Moderate cerebral atrophy. Age-appropriate.   2.  Mild supratentorial white matter disease most consistent with microvascular ischemic change. Common findings for the patient's age.   3.  No evidence of acute cerebral infarction, acute hemorrhage, or mass lesion.      OUTSIDE IMAGES-CT HEAD   Final Result            Total time of the discharge process exceeds 41 minutes.

## 2019-07-12 ENCOUNTER — TELEPHONE (OUTPATIENT)
Dept: CARDIOLOGY | Facility: MEDICAL CENTER | Age: 84
End: 2019-07-12

## 2019-07-12 NOTE — TELEPHONE ENCOUNTER
"----- Message from Sonia Pringle sent at 7/12/2019  9:30 AM PDT -----  Contact: [phone]  AB/sabrina Ba LPN from [facility] calling to ask, \"who signs for AB, referral received but needs a doctor to sign off on it.\"    Please call Jesenia     "

## 2019-07-30 ENCOUNTER — OFFICE VISIT (OUTPATIENT)
Dept: MEDICAL GROUP | Facility: PHYSICIAN GROUP | Age: 84
End: 2019-07-30
Payer: MEDICARE

## 2019-07-30 VITALS
BODY MASS INDEX: 18.31 KG/M2 | HEART RATE: 97 BPM | OXYGEN SATURATION: 91 % | SYSTOLIC BLOOD PRESSURE: 138 MMHG | DIASTOLIC BLOOD PRESSURE: 50 MMHG | WEIGHT: 99.5 LBS | TEMPERATURE: 97.7 F | HEIGHT: 62 IN | RESPIRATION RATE: 18 BRPM

## 2019-07-30 DIAGNOSIS — J96.11 CHRONIC RESPIRATORY FAILURE WITH HYPOXIA (HCC): ICD-10-CM

## 2019-07-30 DIAGNOSIS — I65.21 STENOSIS OF RIGHT CAROTID ARTERY: ICD-10-CM

## 2019-07-30 DIAGNOSIS — I25.10 CORONARY ARTERY DISEASE INVOLVING NATIVE CORONARY ARTERY OF NATIVE HEART WITHOUT ANGINA PECTORIS: ICD-10-CM

## 2019-07-30 DIAGNOSIS — H53.451 LOSS OF PERIPHERAL VISUAL FIELD, RIGHT: ICD-10-CM

## 2019-07-30 DIAGNOSIS — Z98.890 HISTORY OF CAROTID ENDARTERECTOMY: ICD-10-CM

## 2019-07-30 DIAGNOSIS — J44.9 CHRONIC OBSTRUCTIVE PULMONARY DISEASE, UNSPECIFIED COPD TYPE (HCC): ICD-10-CM

## 2019-07-30 PROCEDURE — 99214 OFFICE O/P EST MOD 30 MIN: CPT | Performed by: FAMILY MEDICINE

## 2019-07-30 ASSESSMENT — ENCOUNTER SYMPTOMS
MYALGIAS: 0
HEARTBURN: 0
COUGH: 0
MUSCULOSKELETAL NEGATIVE: 1
DOUBLE VISION: 0
BRUISES/BLEEDS EASILY: 0
RESPIRATORY NEGATIVE: 1
TINGLING: 0
NAUSEA: 0
NEUROLOGICAL NEGATIVE: 1
DIZZINESS: 0
HEADACHES: 0
GASTROINTESTINAL NEGATIVE: 1
PALPITATIONS: 0
HEMOPTYSIS: 0
DEPRESSION: 0
BLURRED VISION: 1
CONSTITUTIONAL NEGATIVE: 1
CARDIOVASCULAR NEGATIVE: 1
FEVER: 0
PSYCHIATRIC NEGATIVE: 1
CHILLS: 0

## 2019-07-30 NOTE — PROGRESS NOTES
Subjective:      Pat Sevilla is a 86 y.o. female who presents with Possible Stroke ([facility] 07/10/2019)            1. Loss of peripheral visual field, right  Patient last month with loss of visual field in right lateral eye, at [facility] noted to have right carotid blockage and underwent endarterectomy  Healing well and since then her visual field has improved markedly  Will continue with meds and has f/u with surgeon on Friday  On exam today surgical site healing well with no signs of infection  Heart in sinus ry thym and lungs clear  Will need to continue with zocor   Long discussion today with the patient about her feelings of loneliness  She is a  and daughter and family live in [place] and son and family live in [facility]  Advised on possibility of assisted living for social interactions    2. History of carotid endarterectomy      3. Stenosis of right carotid artery    - REFERRAL TO CARDIOLOGY    4. Chronic respiratory failure with hypoxia (HCC)  Stable on O2    5. Chronic obstructive pulmonary disease, unspecified COPD type ([facility])  Patient has a diagnosis of copd and is using their medications and trying to avoid triggers such as colds/smoke/allergens.controlled      6. Coronary artery disease involving native coronary artery of native heart without angina pectoris    - REFERRAL TO CARDIOLOGY    Past Medical History:  9/15/2014: Anemia      Comment:  normocytic  07/2008: CAD (coronary artery disease)      Comment:   PCI/BMS x 2 to the LCx, mid (MiniVision 2.25 x 12mm)                and proximal (MiniVision 2.0 x 12mm). March 2016: MPI                with no ischemia or infarct, LVEF 70%.  04/2019: Carotid stenosis, bilateral      Comment:  Carotid ultrasound with <50% stenosis bilaterally.  No date: Cerebrovascular disease  No date: COPD (chronic obstructive pulmonary disease) (HCC)      Comment:  Followed by pulmonology  No date: HTN (hypertension)  No date: Hyperlipidemia  No date: Syncope  Past Surgical  History:  7/9/2019: CAROTID ENDARTERECTOMY; Right      Comment:  Procedure: ENDARTERECTOMY, CAROTID;  Surgeon: Nazario Manning M.D.;  Location: SURGERY [facility];                 Service: Vascular  No date: CARDIAC CATH, LEFT HEART  Smoking status: Former Smoker                                                              Packs/day: 0.50      Years: 60.00        Types: Cigarettes     Quit date: 2/20/2013  Smokeless tobacco: Never Used                      Alcohol use: No              Review of patient's family history indicates:  Problem: Heart Disease      Relation: Mother       Age of Onset: (Not Specified)   Problem: Heart Disease      Relation: Father       Age of Onset: (Not Specified)   Problem: Heart Disease      Relation: Brother       Age of Onset: (Not Specified)       Current Outpatient Prescriptions: •  simvastatin (ZOCOR) 20 MG Tab, Take 2 Tabs by mouth every evening., Disp: 90 Tab, Rfl: 2•  (START ON 8/25/2019) ALPRAZolam (XANAX) 0.5 MG Tab, Take 1 Tab by mouth at bedtime as needed for Sleep for up to 30 days. 1/2 in the morning and 1 at night, Disp: 45 Tab, Rfl: 0•  lisinopril (PRINIVIL, ZESTRIL) 40 MG tablet, TAKE ONE TABLET BY MOUTH DAILY, Disp: 90 Tab, Rfl: 1•  nitroglycerin (NITROSTAT) 0.4 MG SL Tab, Place 1 Tab under tongue as needed for Chest Pain. Not to exceed 3 tablets in 15 minutes, Disp: 25 Tab, Rfl: 5•  ADVAIR DISKUS 500-50 MCG/DOSE AEROSOL POWDER, BREATH ACTIVATED, , Disp: , Rfl: •  INCRUSE ELLIPTA 62.5 MCG/INH AEROSOL POWDER, BREATH ACTIVATED, , Disp: , Rfl:  •  fluticasone-salmeterol (ADVAIR) 500-50 MCG/DOSE AEROSOL POWDER, BREATH ACTIVATED, Inhale 1 Puff by mouth every 12 hours., Disp: , Rfl: •  lidocaine (XYLOCAINE) 5 % Ointment, Apply  to affected area(s) as needed., Disp: , Rfl: •  aspirin EC (ECOTRIN) 81 MG TBEC, Take 81 mg by mouth every day. Indications: Heart Attack, Disp: , Rfl: •  albuterol (VENTOLIN OR PROVENTIL) 108 (90 BASE) MCG/ACT AERS, Inhale 2  "Puffs by mouth every 6 hours as needed. ud , Disp: , Rfl:     Patient was instructed on the use of medications, either prescriptions or OTC and informed on when the appropriate follow up time period should be. In addition, patient was also instructed that should any acute worsening occur that they should notify this clinic asap or call 911.            Review of Systems   Constitutional: Negative.  Negative for chills and fever.   HENT: Negative.  Negative for hearing loss.    Eyes: Positive for blurred vision. Negative for double vision.   Respiratory: Negative.  Negative for cough and hemoptysis.    Cardiovascular: Negative.  Negative for chest pain and palpitations.   Gastrointestinal: Negative.  Negative for heartburn and nausea.   Genitourinary: Negative.  Negative for dysuria.   Musculoskeletal: Negative.  Negative for myalgias.   Skin: Negative.  Negative for rash.   Neurological: Negative.  Negative for dizziness, tingling and headaches.   Endo/Heme/Allergies: Negative.  Does not bruise/bleed easily.   Psychiatric/Behavioral: Negative.  Negative for depression and suicidal ideas.   All other systems reviewed and are negative.         Objective:     /50 (BP Location: Right arm, Patient Position: Sitting, BP Cuff Size: Adult)   Pulse 97   Temp 36.5 °C (97.7 °F)   Resp 18   Ht 1.575 m (5' 2\")   Wt 45.1 kg (99 lb 8 oz)   SpO2 91%   BMI 18.20 kg/m²      Physical Exam   Constitutional: She is oriented to person, place, and time. She appears well-developed and well-nourished. No distress.   HENT:   Head: Normocephalic and atraumatic.   Mouth/Throat: Oropharynx is clear and moist. No oropharyngeal exudate.   Eyes: Pupils are equal, round, and reactive to light.   Still with some deficit in the right lateral visual field but much improved   Neck:   Surgical site healing well   Cardiovascular: Normal rate, regular rhythm, normal heart sounds and intact distal pulses.  Exam reveals no gallop and no friction " rub.    No murmur heard.  Pulmonary/Chest: Effort normal and breath sounds normal. No respiratory distress. She has no wheezes. She has no rales. She exhibits no tenderness.   Neurological: She is alert and oriented to person, place, and time.   Skin: She is not diaphoretic.   Psychiatric: She has a normal mood and affect. Her behavior is normal. Judgment and thought content normal.   Nursing note and vitals reviewed.              Assessment/Plan:     1. Loss of peripheral visual field, right      2. History of carotid endarterectomy      3. Stenosis of right carotid artery    - REFERRAL TO CARDIOLOGY    4. Chronic respiratory failure with hypoxia (HCC)      5. Chronic obstructive pulmonary disease, unspecified COPD type (HCC)      6. Coronary artery disease involving native coronary artery of native heart without angina pectoris  - REFERRAL TO CARDIOLOGY

## 2019-08-07 ENCOUNTER — OFFICE VISIT (OUTPATIENT)
Dept: CARDIOLOGY | Facility: PHYSICIAN GROUP | Age: 84
End: 2019-08-07
Payer: MEDICARE

## 2019-08-07 ENCOUNTER — NON-PROVIDER VISIT (OUTPATIENT)
Dept: CARDIOLOGY | Facility: PHYSICIAN GROUP | Age: 84
End: 2019-08-07
Payer: MEDICARE

## 2019-08-07 ENCOUNTER — TELEPHONE (OUTPATIENT)
Dept: CARDIOLOGY | Facility: PHYSICIAN GROUP | Age: 84
End: 2019-08-07

## 2019-08-07 ENCOUNTER — OFFICE VISIT (OUTPATIENT)
Dept: MEDICAL GROUP | Facility: PHYSICIAN GROUP | Age: 84
End: 2019-08-07
Payer: MEDICARE

## 2019-08-07 VITALS
TEMPERATURE: 97.2 F | HEIGHT: 62 IN | BODY MASS INDEX: 18.58 KG/M2 | HEART RATE: 86 BPM | OXYGEN SATURATION: 93 % | WEIGHT: 101 LBS | SYSTOLIC BLOOD PRESSURE: 120 MMHG | RESPIRATION RATE: 14 BRPM | DIASTOLIC BLOOD PRESSURE: 60 MMHG

## 2019-08-07 VITALS
HEART RATE: 86 BPM | OXYGEN SATURATION: 93 % | DIASTOLIC BLOOD PRESSURE: 60 MMHG | SYSTOLIC BLOOD PRESSURE: 120 MMHG | WEIGHT: 101 LBS | BODY MASS INDEX: 18.58 KG/M2 | HEIGHT: 62 IN

## 2019-08-07 DIAGNOSIS — I47.10 SVT (SUPRAVENTRICULAR TACHYCARDIA): ICD-10-CM

## 2019-08-07 DIAGNOSIS — N18.9 CHRONIC RENAL IMPAIRMENT, UNSPECIFIED CKD STAGE: ICD-10-CM

## 2019-08-07 DIAGNOSIS — J44.9 CHRONIC OBSTRUCTIVE PULMONARY DISEASE, UNSPECIFIED COPD TYPE (HCC): ICD-10-CM

## 2019-08-07 DIAGNOSIS — I25.10 CORONARY ARTERY DISEASE INVOLVING NATIVE CORONARY ARTERY OF NATIVE HEART WITHOUT ANGINA PECTORIS: ICD-10-CM

## 2019-08-07 DIAGNOSIS — I63.231 CEREBROVASCULAR ACCIDENT (CVA) DUE TO STENOSIS OF RIGHT CAROTID ARTERY (HCC): ICD-10-CM

## 2019-08-07 DIAGNOSIS — I65.23 BILATERAL CAROTID ARTERY STENOSIS: ICD-10-CM

## 2019-08-07 DIAGNOSIS — I10 ESSENTIAL HYPERTENSION: ICD-10-CM

## 2019-08-07 DIAGNOSIS — E78.2 MIXED HYPERLIPIDEMIA: ICD-10-CM

## 2019-08-07 PROCEDURE — 99214 OFFICE O/P EST MOD 30 MIN: CPT | Performed by: FAMILY MEDICINE

## 2019-08-07 PROCEDURE — 99214 OFFICE O/P EST MOD 30 MIN: CPT | Performed by: NURSE PRACTITIONER

## 2019-08-07 ASSESSMENT — ENCOUNTER SYMPTOMS
PSYCHIATRIC NEGATIVE: 1
DIZZINESS: 0
CHILLS: 0
DEPRESSION: 0
NAUSEA: 0
NAUSEA: 0
DOUBLE VISION: 0
TINGLING: 0
COUGH: 0
BRUISES/BLEEDS EASILY: 0
MUSCULOSKELETAL NEGATIVE: 1
FEVER: 0
RESPIRATORY NEGATIVE: 1
COUGH: 0
DIZZINESS: 0
EYES NEGATIVE: 1
NEUROLOGICAL NEGATIVE: 1
GASTROINTESTINAL NEGATIVE: 1
BLURRED VISION: 0
PALPITATIONS: 1
HEADACHES: 0
CONSTITUTIONAL NEGATIVE: 1
SHORTNESS OF BREATH: 1
MYALGIAS: 0
HEMOPTYSIS: 0
ORTHOPNEA: 0
ABDOMINAL PAIN: 0
LOSS OF CONSCIOUSNESS: 0
FEVER: 0
CHILLS: 0
INSOMNIA: 1
PALPITATIONS: 0
MYALGIAS: 0
BRUISES/BLEEDS EASILY: 0
HEADACHES: 0
PND: 0
HEARTBURN: 0

## 2019-08-07 NOTE — PROGRESS NOTES
Subjective:      Pat Sevilla is a 86 y.o. female who presents with Follow-Up (medication)            1. Coronary artery disease involving native coronary artery of native heart without angina pectoris  Is currently doing zio patch for some perceived palpitations  Will get results next week    2. Chronic obstructive pulmonary disease, unspecified COPD type (HCC)  Patient has a diagnosis of copd and is using their medications and trying to avoid triggers such as colds/smoke/allergens.controlled      3. Essential hypertension  Currently treated for HTN, taking meds with no CP or sob, monitors bp at home periodically. controlled      4. Chronic renal impairment, unspecified CKD stage  Patient is currently being followed for chronic renal insufficiency. They are avoiding nsaids and maintaining hydration and following renal diet. Taking all appropriate meds. No new change in urine frequency or volume.      Past Medical History:  9/15/2014: Anemia      Comment:  normocytic  07/2008: CAD (coronary artery disease)      Comment:   PCI/BMS x 2 to the LCx, mid (MiniVision 2.25 x 12mm)                and proximal (MiniVision 2.0 x 12mm). March 2016: MPI                with no ischemia or infarct, LVEF 70%.  07/2019: Carotid occlusion, right      Comment:  KENNETH occlusion, status post right CEA by Dr. Manning.  07/2019: Carotid stenosis, bilateral      Comment:  Severe KENNETH occlusion, moderate stenosis of LICA.  No date: Cerebrovascular disease  No date: COPD (chronic obstructive pulmonary disease) (HCC)      Comment:  Followed by pulmonology  No date: HTN (hypertension)  No date: Hyperlipidemia  No date: Syncope  Past Surgical History:  7/9/2019: CAROTID ENDARTERECTOMY; Right      Comment:  Procedure: ENDARTERECTOMY, CAROTID;  Surgeon: Nazario Manning M.D.;  Location: SURGERY [facility];                 Service: Vascular  No date: CARDIAC CATH, LEFT HEART  Social History    Tobacco Use      Smoking  status: Former Smoker        Packs/day: 0.50        Years: 60.00        Pack years: 30        Types: Cigarettes        Quit date: 2013        Years since quittin.4      Smokeless tobacco: Never Used    Alcohol use: No    Drug use: No    Review of patient's family history indicates:  Problem: Heart Disease      Relation: Mother          Age of Onset: (Not Specified)  Problem: Heart Disease      Relation: Father          Age of Onset: (Not Specified)  Problem: Heart Disease      Relation: Brother          Age of Onset: (Not Specified)      Current Outpatient Medications: •  simvastatin (ZOCOR) 20 MG Tab, Take 2 Tabs by mouth every evening., Disp: 90 Tab, Rfl: 2•  (START ON 2019) ALPRAZolam (XANAX) 0.5 MG Tab, Take 1 Tab by mouth at bedtime as needed for Sleep for up to 30 days. 1/2 in the morning and 1 at night, Disp: 45 Tab, Rfl: 0•  lisinopril (PRINIVIL, ZESTRIL) 40 MG tablet, TAKE ONE TABLET BY MOUTH DAILY (Patient taking differently: 20 mg. TAKE ONE TABLET BY MOUTH DAILY), Disp: 90 Tab, Rfl: 1•  nitroglycerin (NITROSTAT) 0.4 MG SL Tab, Place 1 Tab under tongue as needed for Chest Pain. Not to exceed 3 tablets in 15 minutes, Disp: 25 Tab, Rfl: 5•  ADVAIR DISKUS 500-50 MCG/DOSE AEROSOL POWDER, BREATH ACTIVATED, , Disp: , Rfl: •  INCRUSE ELLIPTA 62.5 MCG/INH AEROSOL POWDER, BREATH ACTIVATED, , Disp: , Rfl:  •  fluticasone-salmeterol (ADVAIR) 500-50 MCG/DOSE AEROSOL POWDER, BREATH ACTIVATED, Inhale 1 Puff by mouth every 12 hours., Disp: , Rfl: •  lidocaine (XYLOCAINE) 5 % Ointment, Apply  to affected area(s) as needed., Disp: , Rfl: •  aspirin EC (ECOTRIN) 81 MG TBEC, Take 81 mg by mouth every day. Indications: Heart Attack, Disp: , Rfl: •  albuterol (VENTOLIN OR PROVENTIL) 108 (90 BASE) MCG/ACT AERS, Inhale 2 Puffs by mouth every 6 hours as needed. ud , Disp: , Rfl:     Patient was instructed on the use of medications, either prescriptions or OTC and informed on when the appropriate follow up time  "period should be. In addition, patient was also instructed that should any acute worsening occur that they should notify this clinic asap or call 911.          Review of Systems   Constitutional: Negative.  Negative for chills and fever.   HENT: Negative.  Negative for hearing loss.    Eyes: Negative.  Negative for blurred vision and double vision.   Respiratory: Negative.  Negative for cough and hemoptysis.    Cardiovascular: Positive for palpitations. Negative for chest pain.   Gastrointestinal: Negative.  Negative for heartburn and nausea.   Genitourinary: Negative.  Negative for dysuria.   Musculoskeletal: Negative.  Negative for myalgias.   Skin: Negative.  Negative for rash.   Neurological: Negative.  Negative for dizziness, tingling and headaches.   Endo/Heme/Allergies: Negative.  Does not bruise/bleed easily.   Psychiatric/Behavioral: Negative.  Negative for depression and suicidal ideas.   All other systems reviewed and are negative.         Objective:     /60   Pulse 86   Temp 36.2 °C (97.2 °F)   Resp 14   Ht 1.575 m (5' 2\")   Wt 45.8 kg (101 lb)   SpO2 93%   BMI 18.47 kg/m²      Physical Exam   Constitutional: She is oriented to person, place, and time. She appears well-developed and well-nourished. No distress.   HENT:   Head: Normocephalic and atraumatic.   Mouth/Throat: Oropharynx is clear and moist. No oropharyngeal exudate.   Eyes: Pupils are equal, round, and reactive to light.   Cardiovascular: Normal rate, regular rhythm, normal heart sounds and intact distal pulses. Exam reveals no gallop and no friction rub.   No murmur heard.  Pulmonary/Chest: Effort normal and breath sounds normal. No respiratory distress. She has no wheezes. She has no rales. She exhibits no tenderness.   Neurological: She is alert and oriented to person, place, and time.   Skin: She is not diaphoretic.   Psychiatric: She has a normal mood and affect. Her behavior is normal. Judgment and thought content normal. "   Nursing note and vitals reviewed.              Assessment/Plan:     1. Coronary artery disease involving native coronary artery of native heart without angina pectoris      2. Chronic obstructive pulmonary disease, unspecified COPD type (HCC)      3. Essential hypertension      4. Chronic renal impairment, unspecified CKD stage

## 2019-08-07 NOTE — PROGRESS NOTES
No chief complaint on file.      Subjective:   Pat Sevilla is a 86 y.o. female who presents today for hospital follow-up of right carotid endarterectomy.    Pat is a 86 year old female with history of CAD, status post remote bare metal stents x 2 to the LCx in 2008, hypertension, hyperlipidemia, mild-moderate carotid stenosis and COPD, last seen in March 2019.    Last month, she had sudden loss of vision in her right eye. She was initially seen at outside hospital, and then transferred to [facility] for higher level of care. She was found to have KENNETH occlusion, and underwent right CEA by Dr. Manning.  She did well, and her right vision improved somewhat.    She is here today for follow-up. Generally, she feels well: No chest pain, pressure, tightness or discomfort. No palpitations or fluttering of her heart. No orthopnea or PND; she does use oxygen around the clock. No dizziness or syncope; no edema. BP has been good; her Lisinopril was lowered from 40mg to 20mg in the hospital.    Past Medical History:   Diagnosis Date   • Anemia 9/15/2014    normocytic   • CAD (coronary artery disease) 07/2008     PCI/BMS x 2 to the LCx, mid (MiniVision 2.25 x 12mm) and proximal (MiniVision 2.0 x 12mm). March 2016: MPI with no ischemia or infarct, LVEF 70%.   • Carotid occlusion, right 07/2019    KENNETH occlusion, status post right CEA by Dr. Manning.   • Carotid stenosis, bilateral 07/2019    Severe KENNETH occlusion, moderate stenosis of LICA.   • Cerebrovascular disease    • COPD (chronic obstructive pulmonary disease) ([facility])     Followed by pulmonology   • HTN (hypertension)    • Hyperlipidemia    • Syncope      Past Surgical History:   Procedure Laterality Date   • CAROTID ENDARTERECTOMY Right 7/9/2019    Procedure: ENDARTERECTOMY, CAROTID;  Surgeon: Nazario Manning M.D.;  Location: SURGERY [facility];  Service: Vascular   • CARDIAC CATH, LEFT HEART       Family History   Problem Relation Age of Onset   • Heart Disease  Mother    • Heart Disease Father    • Heart Disease Brother      Social History     Socioeconomic History   • Marital status:      Spouse name: Not on file   • Number of children: Not on file   • Years of education: Not on file   • Highest education level: Not on file   Occupational History   • Not on file   Social Needs   • Financial resource strain: Not on file   • Food insecurity:     Worry: Not on file     Inability: Not on file   • Transportation needs:     Medical: Not on file     Non-medical: Not on file   Tobacco Use   • Smoking status: Former Smoker     Packs/day: 0.50     Years: 60.00     Pack years: 30.00     Types: Cigarettes     Last attempt to quit: 2013     Years since quittin.4   • Smokeless tobacco: Never Used   Substance and Sexual Activity   • Alcohol use: No   • Drug use: No   • Sexual activity: Not on file   Lifestyle   • Physical activity:     Days per week: Not on file     Minutes per session: Not on file   • Stress: Not on file   Relationships   • Social connections:     Talks on phone: Not on file     Gets together: Not on file     Attends Orthodoxy service: Not on file     Active member of club or organization: Not on file     Attends meetings of clubs or organizations: Not on file     Relationship status: Not on file   • Intimate partner violence:     Fear of current or ex partner: Not on file     Emotionally abused: Not on file     Physically abused: Not on file     Forced sexual activity: Not on file   Other Topics Concern   • Not on file   Social History Narrative   • Not on file     Allergies   Allergen Reactions   • Codeine      Outpatient Encounter Medications as of 2019   Medication Sig Dispense Refill   • simvastatin (ZOCOR) 20 MG Tab Take 2 Tabs by mouth every evening. 90 Tab 2   • [START ON 2019] ALPRAZolam (XANAX) 0.5 MG Tab Take 1 Tab by mouth at bedtime as needed for Sleep for up to 30 days. 1/2 in the morning and 1 at night 45 Tab 0   • lisinopril  "(PRINIVIL, ZESTRIL) 40 MG tablet TAKE ONE TABLET BY MOUTH DAILY (Patient taking differently: 20 mg. TAKE ONE TABLET BY MOUTH DAILY) 90 Tab 1   • nitroglycerin (NITROSTAT) 0.4 MG SL Tab Place 1 Tab under tongue as needed for Chest Pain. Not to exceed 3 tablets in 15 minutes 25 Tab 5   • ADVAIR DISKUS 500-50 MCG/DOSE AEROSOL POWDER, BREATH ACTIVATED      • INCRUSE ELLIPTA 62.5 MCG/INH AEROSOL POWDER, BREATH ACTIVATED      • fluticasone-salmeterol (ADVAIR) 500-50 MCG/DOSE AEROSOL POWDER, BREATH ACTIVATED Inhale 1 Puff by mouth every 12 hours.     • lidocaine (XYLOCAINE) 5 % Ointment Apply  to affected area(s) as needed.     • aspirin EC (ECOTRIN) 81 MG TBEC Take 81 mg by mouth every day. Indications: Heart Attack     • albuterol (VENTOLIN OR PROVENTIL) 108 (90 BASE) MCG/ACT AERS Inhale 2 Puffs by mouth every 6 hours as needed. ud        No facility-administered encounter medications on file as of 8/7/2019.      Review of Systems   Constitutional: Negative for chills and fever.   HENT: Negative for congestion.    Respiratory: Positive for shortness of breath. Negative for cough.         Related to COPD.   Cardiovascular: Negative for chest pain, palpitations, orthopnea, leg swelling and PND.   Gastrointestinal: Negative for abdominal pain and nausea.   Musculoskeletal: Negative for myalgias.   Skin: Negative for rash.   Neurological: Negative for dizziness, loss of consciousness and headaches.   Endo/Heme/Allergies: Does not bruise/bleed easily.   Psychiatric/Behavioral: The patient has insomnia.         Uses Xanax PRN.        Objective:   /60 (BP Location: Left arm, Patient Position: Sitting)   Pulse 86   Ht 1.575 m (5' 2\")   Wt 45.8 kg (101 lb)   SpO2 93%   BMI 18.47 kg/m²     Physical Exam   Constitutional: She is oriented to person, place, and time. She appears well-developed and well-nourished.   Petite, thin (BMI 18.47)  Using oxygen via NC.   HENT:   Head: Normocephalic.   Eyes: EOM are normal.   Neck: " Normal range of motion. Neck supple. No JVD present.   Very well healed right CEA incision.   Cardiovascular: Normal rate, regular rhythm and normal heart sounds.   Pulmonary/Chest: Effort normal. No respiratory distress. She has decreased breath sounds in the right lower field and the left lower field. She has no wheezes. She has no rhonchi. She has no rales.   Abdominal: Soft. Bowel sounds are normal. She exhibits no distension. There is no tenderness.   Musculoskeletal: Normal range of motion. She exhibits no edema.   Neurological: She is alert and oriented to person, place, and time.   Skin: Skin is warm and dry. No rash noted.   Psychiatric: She has a normal mood and affect.     CONCLUSIONS OF CAROTID US OF 7/7/2019:   Severe stenosis of the right internal carotid (>70%).    Right subclavian artery >50% stenosis.    Moderate stenosis of the left internal carotid (50-69%).    Antegrade flow, bilateral vertebral arteries.     RESULTS OF RIGHT CEA OF 7/9/2019:  POSTOPERATIVE DIAGNOSIS:  Symptomatic right carotid stenosis.  PROCEDURE:  Right carotid endarterectomy with bovine pericardial patch   angioplasty.     Lab Results   Component Value Date/Time    SODIUM 136 07/10/2019 03:59 AM    POTASSIUM 4.4 07/10/2019 03:59 AM    CHLORIDE 102 07/10/2019 03:59 AM    CO2 26 07/10/2019 03:59 AM    GLUCOSE 167 (H) 07/10/2019 03:59 AM    BUN 14 07/10/2019 03:59 AM    CREATININE 1.02 07/10/2019 03:59 AM    CREATININE 0.84 07/12/2012 07:55 AM    BUNCREATRAT 12 02/16/2018 10:07 AM    BUNCREATRAT 13 07/12/2012 07:55 AM     Lab Results   Component Value Date/Time    WBC 9.0 07/10/2019 03:59 AM    RBC 2.60 (L) 07/10/2019 03:59 AM    HEMOGLOBIN 7.9 (L) 07/10/2019 10:58 AM    HEMATOCRIT 25.6 (L) 07/10/2019 10:58 AM    MCV 93.8 07/10/2019 03:59 AM    MCH 28.5 07/10/2019 03:59 AM    MCHC 30.3 (L) 07/10/2019 03:59 AM    MPV 11.1 07/10/2019 03:59 AM        Assessment:     1. Bilateral carotid artery stenosis  [facility] / Event Monitor   2.  Cerebrovascular accident (CVA) due to stenosis of right carotid artery (HCC)  [facility] / Event Monitor   3. Coronary artery disease involving native coronary artery of native heart without angina pectoris     4. Essential hypertension     5. Mixed hyperlipidemia     6. Chronic obstructive pulmonary disease, unspecified COPD type (HCC)         Medical Decision Making:  Today's Assessment / Status / Plan:     1. Recent KENNETH occlusion with vision loss, status post right CEA, doing well. We will monitor the left side, which showed moderate stenosis.    2. CAD, status post remote stents, stable. She has not had any angina. She is on ASA, statin and ACEI.    3. Hypertension, treated and stable. ACEI was recently lowered from 40mg to 20mg; BP is good.    4. Hyperlipidemia, treated with Zocor.    5. COPD, related to smoking history, on oxygen and inhalers.    She remains stable from a cardiac standpoint. Same medications. Follow-up in 3 months, sooner if clinical condition changes.    Collaborating MD: Gunnar

## 2019-08-28 PROCEDURE — 0298T PR EXT ECG > 48HR TO 21 DAY REVIEW AND INTERPRETATN: CPT | Performed by: INTERNAL MEDICINE

## 2019-08-28 PROCEDURE — 0296T PR EXT ECG > 48HR TO 21 DAY RCRD W/CONECT INTL RCRD: CPT | Performed by: INTERNAL MEDICINE

## 2019-09-04 ENCOUNTER — OFFICE VISIT (OUTPATIENT)
Dept: CARDIOLOGY | Facility: PHYSICIAN GROUP | Age: 84
End: 2019-09-04
Payer: MEDICARE

## 2019-09-04 VITALS — WEIGHT: 100 LBS | BODY MASS INDEX: 18.4 KG/M2 | OXYGEN SATURATION: 93 % | HEIGHT: 62 IN | HEART RATE: 92 BPM

## 2019-09-04 DIAGNOSIS — I47.10 SVT (SUPRAVENTRICULAR TACHYCARDIA): ICD-10-CM

## 2019-09-04 DIAGNOSIS — J44.9 CHRONIC OBSTRUCTIVE PULMONARY DISEASE, UNSPECIFIED COPD TYPE (HCC): ICD-10-CM

## 2019-09-04 DIAGNOSIS — I25.10 CORONARY ARTERY DISEASE INVOLVING NATIVE CORONARY ARTERY OF NATIVE HEART WITHOUT ANGINA PECTORIS: ICD-10-CM

## 2019-09-04 DIAGNOSIS — I10 ESSENTIAL HYPERTENSION: ICD-10-CM

## 2019-09-04 DIAGNOSIS — I65.23 BILATERAL CAROTID ARTERY STENOSIS: ICD-10-CM

## 2019-09-04 DIAGNOSIS — I63.231 CEREBROVASCULAR ACCIDENT (CVA) DUE TO STENOSIS OF RIGHT CAROTID ARTERY (HCC): ICD-10-CM

## 2019-09-04 DIAGNOSIS — E78.2 MIXED HYPERLIPIDEMIA: ICD-10-CM

## 2019-09-04 PROCEDURE — 99214 OFFICE O/P EST MOD 30 MIN: CPT | Performed by: NURSE PRACTITIONER

## 2019-09-04 RX ORDER — DILTIAZEM HYDROCHLORIDE 120 MG/1
120 CAPSULE, COATED, EXTENDED RELEASE ORAL DAILY
Qty: 90 CAP | Refills: 1 | Status: SHIPPED | OUTPATIENT
Start: 2019-09-04

## 2019-09-04 RX ORDER — LISINOPRIL 10 MG/1
10 TABLET ORAL DAILY
Qty: 90 TAB | Refills: 1 | Status: SHIPPED | OUTPATIENT
Start: 2019-09-04

## 2019-09-04 ASSESSMENT — ENCOUNTER SYMPTOMS
ABDOMINAL PAIN: 0
BRUISES/BLEEDS EASILY: 0
CHILLS: 0
SHORTNESS OF BREATH: 1
HEADACHES: 0
INSOMNIA: 1
PALPITATIONS: 0
LOSS OF CONSCIOUSNESS: 0
COUGH: 0
FEVER: 0
DIZZINESS: 0
ORTHOPNEA: 0
NAUSEA: 0
MYALGIAS: 0
PND: 0

## 2019-09-04 NOTE — PROGRESS NOTES
Chief Complaint   Patient presents with   • Follow-Up   • Possible Stroke   • Palpitations       Subjective:   Pat Sevilla is a 87 y.o. female who presents today for one month follow-up of palpitations and recent stroke    Pat is a 87 year old female with history of CAD, status post remote bare metal stents x 2 to the LCx in 2008, hypertension, hyperlipidemia, mild-moderate carotid stenosis and COPD, last seen in March 2019.    In July 2019, she had sudden loss of vision in her right eye. She was initially seen at outside hospital, and then transferred to [facility] for higher level of care. She was found to have KENNETH occlusion, and underwent right CEA by Dr. Manning.  She did well, and her right vision improved somewhat. At follow-up last month, we did a ZioPatch to assess for any arrhythmias.     She is here today for follow-up. Generally, she is tired and doesn't have a lot of energy. She uses oxygen around the clock, 5L/minute.  No chest pain, pressure, tightness or discomfort. No further palpitations or fluttering of her heart. No orthopnea or PND. No dizziness or syncope; no edema. BP has been running 105-155 systolic at home.     Past Medical History:   Diagnosis Date   • Anemia 9/15/2014    normocytic   • CAD (coronary artery disease) 07/2008     PCI/BMS x 2 to the LCx, mid (MiniVision 2.25 x 12mm) and proximal (MiniVision 2.0 x 12mm). March 2016: MPI with no ischemia or infarct, LVEF 70%.   • Carotid occlusion, right 07/2019    KENNETH occlusion, status post right CEA by Dr. Manning.   • Carotid stenosis, bilateral 07/2019    Severe KENNETH occlusion, moderate stenosis of LICA.   • Cerebrovascular disease    • COPD (chronic obstructive pulmonary disease) (HCC)     Followed by pulmonology   • HTN (hypertension)    • Hyperlipidemia    • Syncope      Past Surgical History:   Procedure Laterality Date   • CAROTID ENDARTERECTOMY Right 7/9/2019    Procedure: ENDARTERECTOMY, CAROTID;  Surgeon: Nazario Manning,  M.D.;  Location: SURGERY [facility];  Service: Vascular   • CARDIAC CATH, LEFT HEART       Family History   Problem Relation Age of Onset   • Heart Disease Mother    • Heart Disease Father    • Heart Disease Brother      Social History     Socioeconomic History   • Marital status:      Spouse name: Not on file   • Number of children: Not on file   • Years of education: Not on file   • Highest education level: Not on file   Occupational History   • Not on file   Social Needs   • Financial resource strain: Not on file   • Food insecurity:     Worry: Not on file     Inability: Not on file   • Transportation needs:     Medical: Not on file     Non-medical: Not on file   Tobacco Use   • Smoking status: Former Smoker     Packs/day: 0.50     Years: 60.00     Pack years: 30.00     Types: Cigarettes     Last attempt to quit: 2013     Years since quittin.5   • Smokeless tobacco: Never Used   Substance and Sexual Activity   • Alcohol use: No   • Drug use: No   • Sexual activity: Not on file   Lifestyle   • Physical activity:     Days per week: Not on file     Minutes per session: Not on file   • Stress: Not on file   Relationships   • Social connections:     Talks on phone: Not on file     Gets together: Not on file     Attends Sabianist service: Not on file     Active member of club or organization: Not on file     Attends meetings of clubs or organizations: Not on file     Relationship status: Not on file   • Intimate partner violence:     Fear of current or ex partner: Not on file     Emotionally abused: Not on file     Physically abused: Not on file     Forced sexual activity: Not on file   Other Topics Concern   • Not on file   Social History Narrative   • Not on file     Allergies   Allergen Reactions   • Codeine      Outpatient Encounter Medications as of 2019   Medication Sig Dispense Refill   • lisinopril (PRINIVIL) 10 MG Tab Take 1 Tab by mouth every day. 90 Tab 1   • DILTIAZem CD (CARDIZEM  "CD) 120 MG CAPSULE SR 24 HR Take 1 Cap by mouth every day. 90 Cap 1   • simvastatin (ZOCOR) 20 MG Tab Take 2 Tabs by mouth every evening. 90 Tab 2   • ALPRAZolam (XANAX) 0.5 MG Tab Take 1 Tab by mouth at bedtime as needed for Sleep for up to 30 days. 1/2 in the morning and 1 at night 45 Tab 0   • [DISCONTINUED] lisinopril (PRINIVIL, ZESTRIL) 40 MG tablet TAKE ONE TABLET BY MOUTH DAILY (Patient taking differently: 20 mg. TAKE ONE TABLET BY MOUTH DAILY) 90 Tab 1   • nitroglycerin (NITROSTAT) 0.4 MG SL Tab Place 1 Tab under tongue as needed for Chest Pain. Not to exceed 3 tablets in 15 minutes 25 Tab 5   • ADVAIR DISKUS 500-50 MCG/DOSE AEROSOL POWDER, BREATH ACTIVATED      • INCRUSE ELLIPTA 62.5 MCG/INH AEROSOL POWDER, BREATH ACTIVATED      • fluticasone-salmeterol (ADVAIR) 500-50 MCG/DOSE AEROSOL POWDER, BREATH ACTIVATED Inhale 1 Puff by mouth every 12 hours.     • lidocaine (XYLOCAINE) 5 % Ointment Apply  to affected area(s) as needed.     • aspirin EC (ECOTRIN) 81 MG TBEC Take 81 mg by mouth every day. Indications: Heart Attack     • albuterol (VENTOLIN OR PROVENTIL) 108 (90 BASE) MCG/ACT AERS Inhale 2 Puffs by mouth every 6 hours as needed. ud        No facility-administered encounter medications on file as of 9/4/2019.      Review of Systems   Constitutional: Negative for chills and fever.   HENT: Negative for congestion.    Respiratory: Positive for shortness of breath. Negative for cough.         Related to COPD.   Cardiovascular: Negative for chest pain, palpitations, orthopnea, leg swelling and PND.   Gastrointestinal: Negative for abdominal pain and nausea.   Musculoskeletal: Negative for myalgias.   Skin: Negative for rash.   Neurological: Negative for dizziness, loss of consciousness and headaches.   Endo/Heme/Allergies: Does not bruise/bleed easily.   Psychiatric/Behavioral: The patient has insomnia.         Uses Xanax PRN.        Objective:   Pulse 92   Ht 1.575 m (5' 2\")   Wt 45.4 kg (100 lb)   SpO2 " 93%   BMI 18.29 kg/m²     Physical Exam   Constitutional: She is oriented to person, place, and time. She appears well-developed and well-nourished.   Petite, thin (BMI 18.29)  Using oxygen via NC.   HENT:   Head: Normocephalic.   Eyes: EOM are normal.   Neck: Normal range of motion. Neck supple. No JVD present.   Very well healed right CEA incision.   Cardiovascular: Normal rate, regular rhythm and normal heart sounds.   Pulmonary/Chest: Effort normal. No respiratory distress. She has decreased breath sounds in the right lower field and the left lower field. She has no wheezes. She has no rhonchi. She has no rales.   Abdominal: Soft. Bowel sounds are normal. She exhibits no distension. There is no tenderness.   Musculoskeletal: Normal range of motion. She exhibits no edema.   Neurological: She is alert and oriented to person, place, and time.   Skin: Skin is warm and dry. No rash noted.   Psychiatric: She has a normal mood and affect.     RESULTS OF ZIOPATCH OF 8/7-8/21/2019:  Sinus rhythm  Minimum HR 57bpm  Maximum HR 190bpm  [place] HR 74bpm  2 VT episodes, lasting 9 beats, 162bpm  62 SVT episodes, longest lasting 14 beats  No pauses  No AV block  No AFib/flutter    Assessment:     1. SVT (supraventricular tachycardia) (HCC)  DILTIAZem CD (CARDIZEM CD) 120 MG CAPSULE SR 24 HR   2. Essential hypertension  lisinopril (PRINIVIL) 10 MG Tab   3. Coronary artery disease involving native coronary artery of native heart without angina pectoris     4. Chronic obstructive pulmonary disease, unspecified COPD type (HCC)     5. Cerebrovascular accident (CVA) due to stenosis of right carotid artery (HCC)     6. Bilateral carotid artery stenosis     7. Mixed hyperlipidemia         Medical Decision Making:  Today's Assessment / Status / Plan:     1. SVT episodes, and two VT episodes. Given her COPD, will avoid a beta blocker, and start Cardizem 120mg once daily. To also cut Lisinopril from 20mg to 10mg, to make sure BP doesn't  fall too much. To FU with me in 4 weeks, to reassess BP and HR response.    2. Hypertension, treated. As above, to decrease Lisinopril from 20mg to 10mg, as I am adding Cardizem 120mg.    3. CAD, status post stents in 2008. MPI in 2016 was normal/negative.    4. COPD, treated with inhalers an oxygen.    5. Recent CVA, related to right carotid occlusion, status post right CEA.    6. Hyperlipidemia, treated.    Plan as above. FU in 4 weeks for BP and HR recheck; FU sooner if clinical condition changes.    Collaborating MD: Gunnar

## 2019-09-11 ENCOUNTER — TELEPHONE (OUTPATIENT)
Dept: CARDIOLOGY | Facility: MEDICAL CENTER | Age: 84
End: 2019-09-11

## 2019-09-11 DIAGNOSIS — I25.10 CORONARY ARTERY DISEASE INVOLVING NATIVE CORONARY ARTERY OF NATIVE HEART WITHOUT ANGINA PECTORIS: ICD-10-CM

## 2019-09-11 DIAGNOSIS — E78.2 MIXED HYPERLIPIDEMIA: ICD-10-CM

## 2019-09-11 RX ORDER — PRAVASTATIN SODIUM 20 MG
20 TABLET ORAL EVERY EVENING
Qty: 90 TAB | Refills: 3 | Status: SHIPPED | OUTPATIENT
Start: 2019-09-11

## 2019-09-11 NOTE — TELEPHONE ENCOUNTER
Lipid panel was not ordered for the lab work done yesterday.  Lipid and CMP ordered to check in 2 months on Pravastatin.

## 2019-09-11 NOTE — TELEPHONE ENCOUNTER
Pharmacy wanting to change Simvastatin to Pravastatin.  Ok with Patient and Lucia Spivey.  Rx called in and lab order for 2 months mailed to patient.    Lab work from [id number] (yesterday) in [facility].  Patient aware of results.  To Lucia GLEASON

## 2019-09-16 ENCOUNTER — TELEPHONE (OUTPATIENT)
Dept: CARDIOLOGY | Facility: MEDICAL CENTER | Age: 84
End: 2019-09-16

## 2019-09-16 DIAGNOSIS — I25.10 CORONARY ARTERY DISEASE INVOLVING NATIVE CORONARY ARTERY OF NATIVE HEART WITHOUT ANGINA PECTORIS: ICD-10-CM

## 2019-09-16 DIAGNOSIS — I10 ESSENTIAL HYPERTENSION: ICD-10-CM

## 2019-09-16 DIAGNOSIS — I47.10 SVT (SUPRAVENTRICULAR TACHYCARDIA): ICD-10-CM

## 2019-09-16 NOTE — TELEPHONE ENCOUNTER
Called pt back. Pt states pravastatin prescription is costing her $50 out-of-pocket which she can't afford. Simvastatin initially ordered for pt however was changed per pharmacy request (per note on 9/11/19, drug interaction with diltiazem).    To Lucia - recommendations on alternative statin for pt? Thanks!

## 2019-09-16 NOTE — TELEPHONE ENCOUNTER
AB      Patient said she can't afford the Pravastatin. She wants to know what Lucia wants her to do. She can be reached at [phone].

## 2019-09-16 NOTE — TELEPHONE ENCOUNTER
Pt called back. She meant her diltiazem prescription, not pravastatin.    To Lucia - please advise. Thanks!

## 2019-09-23 RX ORDER — METOPROLOL SUCCINATE 25 MG/1
25 TABLET, EXTENDED RELEASE ORAL DAILY
Qty: 30 TAB | Refills: 3 | Status: SHIPPED | OUTPATIENT
Start: 2019-09-23

## 2019-09-23 NOTE — TELEPHONE ENCOUNTER
She COULD try Toprol XL 25mg once daily, but watch for any increasing shortness of breath. She does have fairly severe COPD, and Toprol might affect that a little. (It should be cheaper too).  Thanks, AB

## 2019-09-23 NOTE — TELEPHONE ENCOUNTER
Called pt regarding medication recommendation per AB, advised on monitoring her shortness of breath and to call if issues arise, verbalized understanding. Toprol XL 25mg PO daily ordered and sent to pt's [facility] pharmacy in [place].    Confirmed pt's 10/16/19 appointment. Pt is asking if she can receive a phone call reminder regarding her FV.    To Schedulers -  Can you please send pt a phone call reminder? Thanks!

## 2019-10-14 DIAGNOSIS — F41.9 ANXIETY: ICD-10-CM

## 2019-10-14 RX ORDER — ALPRAZOLAM 0.5 MG/1
0.5 TABLET ORAL NIGHTLY PRN
Qty: 45 TAB | Refills: 0 | Status: SHIPPED | OUTPATIENT
Start: 2019-10-14 | End: 2019-11-13

## 2019-10-14 NOTE — TELEPHONE ENCOUNTER
Was the patient seen in the last year in this department? Yes    Does patient have an active prescription for medications requested? Yes    Received Request Via: Pharmacy     Last visit 8/7/2019  Last labs 7/10/2019

## 2021-01-11 DIAGNOSIS — Z23 NEED FOR VACCINATION: ICD-10-CM

## (undated) DEVICE — GOWN SURGEONS X-LARGE - DISP. (30/CA)

## (undated) DEVICE — CANISTER SUCTION 3000ML MECHANICAL FILTER AUTO SHUTOFF MEDI-VAC NONSTERILE LF DISP  (40EA/CA)

## (undated) DEVICE — SET EXTENSION WITH 2 PORTS (48EA/CA) ***PART #2C8610 IS A SUBSTITUTE*****

## (undated) DEVICE — SUTURE 6-0 PROLENE BV-1 D/A 30 (36PK/BX)"

## (undated) DEVICE — WIPE INSTRUMENT MICROWIPE (20EA/SP)

## (undated) DEVICE — SUTURE CV

## (undated) DEVICE — SENSOR SPO2 NEO LNCS ADHESIVE (20/BX) SEE USER NOTES

## (undated) DEVICE — TUBING CLEARLINK DUO-VENT - C-FLO (48EA/CA)

## (undated) DEVICE — SHEET THYROID - (10EA/CA)

## (undated) DEVICE — SUTURE 2-0 VICRYL PLUS CT-1 36 (36PK/BX)"

## (undated) DEVICE — NEPTUNE 4 PORT MANIFOLD - (20/PK)

## (undated) DEVICE — PACK MINOR BASIN - (2EA/CA)

## (undated) DEVICE — SOD. CHL. INJ. 0.9% 1000 ML - (14EA/CA 60CA/PF)

## (undated) DEVICE — GOWN SURGEONS LARGE - (32/CA)

## (undated) DEVICE — VESSELOOP MINI BLUE STERILE - SURG-I-LOOP (10EA/BX)

## (undated) DEVICE — ELECTRODE DUAL RETURN W/ CORD - (50/PK)

## (undated) DEVICE — POLY UMBILICAL TAPE 1/8X30 - (36/BX)

## (undated) DEVICE — PROTECTOR ULNA NERVE - (36PR/CA)

## (undated) DEVICE — KIT GEL-FLOW NT ABORBABLE GELATIN (6EA/BX)

## (undated) DEVICE — KIT ROOM DECONTAMINATION

## (undated) DEVICE — DRAPE MAGNETIC (INSTRA-MAG) - (30/CA)

## (undated) DEVICE — BLADE SURGICAL #11 - (50/BX)

## (undated) DEVICE — GLOVE BIOGEL SZ 8 SURGICAL PF LTX - (50PR/BX 4BX/CA)

## (undated) DEVICE — KIT ANESTHESIA W/CIRCUIT & 3/LT BAG W/FILTER (20EA/CA)

## (undated) DEVICE — LACTATED RINGERS INJ 1000 ML - (14EA/CA 60CA/PF)

## (undated) DEVICE — ELECTRODE 850 FOAM ADHESIVE - HYDROGEL RADIOTRNSPRNT (50/PK)

## (undated) DEVICE — SUTURE 2-0 VICRYL PLUS CT-1 - 8 X 18 INCH(12/BX)

## (undated) DEVICE — BAG DECANTER (50EA/CS)

## (undated) DEVICE — SODIUM CHL. INJ. 0.9% 500ML (24EA/CA 50CA/PF)

## (undated) DEVICE — CHLORAPREP 26 ML APPLICATOR - ORANGE TINT(25/CA)

## (undated) DEVICE — SODIUM CHL IRRIGATION 0.9% 1000ML (12EA/CA)

## (undated) DEVICE — VESSELOOP MAXI BLUE STERILE- SURG-I-LOOP (10EA/BX)

## (undated) DEVICE — TOWELS CLOTH SURGICAL - (4/PK 20PK/CA)

## (undated) DEVICE — CLIP MED INTNL HRZN TI ESCP - (25/BX)

## (undated) DEVICE — SUTURE 4-0 30CM STRATAFIX SPIRAL PS-2 (12EA/BX)

## (undated) DEVICE — SUTURE 5-0 PROLENE BLUE C-1 HS 1 X 30 (36EA/BX)"

## (undated) DEVICE — SHUNT CAROTID FLEXCEL 14.5CM - 5/BX

## (undated) DEVICE — SUTURE 2-0 ETHILON FS - (36/BX) 18 INCH

## (undated) DEVICE — HEAD HOLDER JUNIOR/ADULT

## (undated) DEVICE — SUCTION INSTRUMENT YANKAUER BULBOUS TIP W/O VENT (50EA/CA)

## (undated) DEVICE — CLIP SM INTNL HRZN TI ESCP LGT - (24EA/PK 25PK/BX)

## (undated) DEVICE — Device

## (undated) DEVICE — RESERVOIR SUCTION 100 CC - SILICONE (20EA/CA)

## (undated) DEVICE — SET LEADWIRE 5 LEAD BEDSIDE DISPOSABLE ECG (1SET OF 5/EA)

## (undated) DEVICE — SUTURE 3-0 SILK 12 X 18 IN - (36/BX)

## (undated) DEVICE — MASK ANESTHESIA ADULT  - (100/CA)

## (undated) DEVICE — SUTURE GENERAL

## (undated) DEVICE — GOWN WARMING STANDARD FLEX - (30/CA)

## (undated) DEVICE — STAPLER SKIN DISP - (6/BX 10BX/CA) VISISTAT

## (undated) DEVICE — SLEEVE, VASO, THIGH, MED

## (undated) DEVICE — DRAIN J-VAC 7MM FLAT - (10EA/CA)